# Patient Record
Sex: MALE | Race: BLACK OR AFRICAN AMERICAN | NOT HISPANIC OR LATINO | Employment: FULL TIME | URBAN - METROPOLITAN AREA
[De-identification: names, ages, dates, MRNs, and addresses within clinical notes are randomized per-mention and may not be internally consistent; named-entity substitution may affect disease eponyms.]

---

## 2023-10-04 PROBLEM — M48.062 LUMBAR STENOSIS WITH NEUROGENIC CLAUDICATION: Status: ACTIVE | Noted: 2023-10-04

## 2023-10-04 PROBLEM — R29.898 LEG WEAKNESS: Status: ACTIVE | Noted: 2023-10-04

## 2023-10-04 PROBLEM — M25.569 KNEE PAIN: Status: ACTIVE | Noted: 2023-10-04

## 2023-10-05 ENCOUNTER — TREATMENT (OUTPATIENT)
Dept: PHYSICAL THERAPY | Facility: CLINIC | Age: 73
End: 2023-10-05
Payer: COMMERCIAL

## 2023-10-05 DIAGNOSIS — M48.062 LUMBAR STENOSIS WITH NEUROGENIC CLAUDICATION: Primary | ICD-10-CM

## 2023-10-05 PROCEDURE — 97110 THERAPEUTIC EXERCISES: CPT | Mod: GP

## 2023-10-05 ASSESSMENT — ENCOUNTER SYMPTOMS
LOSS OF SENSATION IN FEET: 1
DEPRESSION: 0
OCCASIONAL FEELINGS OF UNSTEADINESS: 1

## 2023-10-05 NOTE — PROGRESS NOTES
"Physical Therapy    Physical Therapy Treatment    Patient Name: Chris Monique  MRN: [id number]  Today's Date: 10/5/2023  Time Calculation  Start Time: 1650  Stop Time: 1735  Time Calculation (min): 45 min  Visit number: 2    Assessment:  PT Assessment  Assessment Comment: Patient tolerated stretching and core strengthening well this session. Unfortunately he has not experinced centralization of his L LE radicular symptoms.    Plan:  OP PT Plan  Treatment/Interventions: Cryotherapy, Dry needling, Education/ Instruction, Electrical stimulation, Hot pack, Manual therapy, Neuromuscular re-education, Therapeutic activities, Therapeutic exercises  PT Plan: Skilled PT  PT Frequency: 1 time per week  Duration: 8 weeks  Onset Date: 01/01/87  Certification Period Start Date: 10/05/23  Certification Period End Date: 01/03/24  Rehab Potential: Good  Plan of Care Agreement: Patient    Current Problem  1. Lumbar stenosis with neurogenic claudication            Subjective   General  General Comment: Patient reports low back pain that radiates down his left leg to the foot.  Precautions   None   Pain   8/10    Treatments:  Therapeutic Exercise  Therapeutic Exercise Performed: Yes  Therapeutic Exercise Activity 1: bike x 5 minutes  Therapeutic Exercise Activity 2: back extension machine 40# 3 x 10  Therapeutic Exercise Activity 3: hamstring curl machine 33# 3 x 10  Therapeutic Exercise Activity 4: LTR 10 x 5\"  Therapeutic Exercise Activity 5: open book 10 x 10\"  Therapeutic Exercise Activity 6: supine PPT 20 x 5\"  Therapeutic Exercise Activity 7: piriformis stretch 3 x 30\"  Therapeutic Exercise Activity 8: bridge 2 x 10  Therapeutic Exercise Activity 9: TA brace marching x 20 ea leg  Therapeutic Exercise Activity 10: TA brace knee fallouts x 20 ea leg      Goals:  Encounter Problems       Encounter Problems (Active)       PT Problem       We are continuing the therapy plan of care and goals that were documented in the legacy EMR.  " Please refer to the PT (or OT) plan of care in the EMR for treatment plan and goals.        Start:  10/05/23    Expected End:  01/03/24

## 2023-10-12 ENCOUNTER — TREATMENT (OUTPATIENT)
Dept: PHYSICAL THERAPY | Facility: CLINIC | Age: 73
End: 2023-10-12
Payer: COMMERCIAL

## 2023-10-12 DIAGNOSIS — M48.062 LUMBAR STENOSIS WITH NEUROGENIC CLAUDICATION: Primary | ICD-10-CM

## 2023-10-12 PROCEDURE — 97110 THERAPEUTIC EXERCISES: CPT | Mod: GP

## 2023-10-12 NOTE — PROGRESS NOTES
"Physical Therapy    Physical Therapy Treatment    Patient Name: Chris Monique  MRN: [id number]  Today's Date: 10/12/2023  Time Calculation  Start Time: 1725  Stop Time: 1805  Time Calculation (min): 40 min  Visit: 3    Assessment:  PT Assessment  Assessment Comment: Patient tolerated stretching and core strengthening well this session. Unfortunately he has not experinced centralization of his L LE radicular symptoms.    Plan:  OP PT Plan  Treatment/Interventions: Cryotherapy, Dry needling, Education/ Instruction, Electrical stimulation, Hot pack, Manual therapy, Neuromuscular re-education, Therapeutic activities, Therapeutic exercises  PT Plan: Skilled PT  PT Frequency: 1 time per week  Duration: 8 weeks  Onset Date: 01/01/87  Certification Period Start Date: 10/05/23  Certification Period End Date: 01/03/24  Rehab Potential: Good  Plan of Care Agreement: Patient    Current Problem  1. Lumbar stenosis with neurogenic claudication            Subjective   General  General Comment: Patient reports low back pain that radiates down his left leg to the foot.  Precautions   None   Pain   8/10    Objective   Cognition   WNL    Treatments:  Therapeutic Exercise  Therapeutic Exercise Performed: Yes  Therapeutic Exercise Activity 1: bike x 5 minutes  Therapeutic Exercise Activity 2: back extension machine 40# 3 x 10  Therapeutic Exercise Activity 3: hamstring curl machine 33# 3 x 10  Therapeutic Exercise Activity 4: LTR 10 x 5\"  Therapeutic Exercise Activity 5: open book 10 x 10\"  Therapeutic Exercise Activity 6: supine PPT 20 x 5\"  Therapeutic Exercise Activity 7: piriformis stretch 3 x 30\"  Therapeutic Exercise Activity 8: hip flexor stretch 3 x 30\"  Therapeutic Exercise Activity 9: TA brace marching x 20 ea leg  Therapeutic Exercise Activity 10: TA brace knee fallouts x 20 ea leg       Goals:  Active       PT Problem       We are continuing the therapy plan of care and goals that were documented in the legacy EMR.  Please " refer to the PT (or OT) plan of care in the EMR for treatment plan and goals.        Start:  10/05/23    Expected End:  01/03/24

## 2023-10-19 ENCOUNTER — TREATMENT (OUTPATIENT)
Dept: PHYSICAL THERAPY | Facility: CLINIC | Age: 73
End: 2023-10-19
Payer: COMMERCIAL

## 2023-10-19 DIAGNOSIS — M48.062 LUMBAR STENOSIS WITH NEUROGENIC CLAUDICATION: Primary | ICD-10-CM

## 2023-10-19 PROCEDURE — 97110 THERAPEUTIC EXERCISES: CPT | Mod: GP

## 2023-10-19 NOTE — PROGRESS NOTES
"Physical Therapy    Physical Therapy Treatment    Patient Name: Chris Monique  MRN: [id number]  Today's Date: 10/19/2023  Time Calculation  Start Time: 1615  Stop Time: 1700  Time Calculation (min): 45 min  Visit:4    Assessment and Plan:  PT Assessment  Assessment Comment: Patient tolerated stretching and core strengthening well this session. Unfortunately he has not experinced centralization of his L LE radicular symptoms. Patient is compliant to discharged from formal physical therapy and complete his home exercise program independently. He will schedule an appointment with an orthopedic specialist to explore further treatment.        Current Problem  1. Lumbar stenosis with neurogenic claudication            Subjective   General  General Comment: Patient reports low back pain that radiates down his left leg to the foot.  Precautions   None     Pain   8/10    Objective   Cognition   WNL    Treatments:  Therapeutic Exercise  Therapeutic Exercise Performed: Yes  Therapeutic Exercise Activity 1: bike x 5 minutes  Therapeutic Exercise Activity 2: back extension machine 40# 3 x 10  Therapeutic Exercise Activity 3: hamstring curl machine 33# 3 x 10  Therapeutic Exercise Activity 4: LTR 10 x 5\"  Therapeutic Exercise Activity 5: open book 10 x 10\"  Therapeutic Exercise Activity 6: supine PPT 20 x 5\"  Therapeutic Exercise Activity 7: piriformis stretch 3 x 30\"  Therapeutic Exercise Activity 8: hip flexor stretch 3 x 30\"  Therapeutic Exercise Activity 9: TA brace marching x 20 ea leg  Therapeutic Exercise Activity 10: TA brace knee fallouts x 20 ea leg    Goals:  Active       PT Problem       We are continuing the therapy plan of care and [facility]  Please refer to the PT (or OT) plan of care in the EMR for treatment plan and goals.        Start:  10/05/23    Expected End:  01/03/24               "

## 2023-10-26 ENCOUNTER — TREATMENT (OUTPATIENT)
Dept: PHYSICAL THERAPY | Facility: CLINIC | Age: 73
End: 2023-10-26
Payer: COMMERCIAL

## 2023-11-06 ENCOUNTER — OFFICE VISIT (OUTPATIENT)
Dept: ORTHOPEDIC SURGERY | Facility: CLINIC | Age: 73
End: 2023-11-06
Payer: COMMERCIAL

## 2023-11-06 ENCOUNTER — ANCILLARY PROCEDURE (OUTPATIENT)
Dept: RADIOLOGY | Facility: CLINIC | Age: 73
End: 2023-11-06
Payer: COMMERCIAL

## 2023-11-06 DIAGNOSIS — M54.50 LUMBAR PAIN: ICD-10-CM

## 2023-11-06 DIAGNOSIS — M54.50 LUMBAR PAIN: Primary | ICD-10-CM

## 2023-11-06 DIAGNOSIS — M99.73 CONNECTIVE TISSUE AND DISC STENOSIS OF INTERVERTEBRAL FORAMINA OF LUMBAR REGION: ICD-10-CM

## 2023-11-06 PROCEDURE — 72110 X-RAY EXAM L-2 SPINE 4/>VWS: CPT

## 2023-11-06 PROCEDURE — 72110 X-RAY EXAM L-2 SPINE 4/>VWS: CPT | Performed by: RADIOLOGY

## 2023-11-06 PROCEDURE — 99213 OFFICE O/P EST LOW 20 MIN: CPT | Performed by: ORTHOPAEDIC SURGERY

## 2023-11-06 NOTE — PROGRESS NOTES
Chief Complaint: Chronic low back pain    HPI: Chris Monique is a 73 y.o. year old male patient with history of hypertension hyperlipidemia and BPH with chronic low back pain for the past 40 years.  He was in the [employer].  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  Pain located in the lower lumbar spine radiates across the beltline area.  Occasionally he has some radicular tingling sensation down his left leg.  He otherwise denies any left groin pain he does have some pain around his left hamstring which he think he pulled his hamstring while working out at the gym.  His radicular leg pain is worse with standing and walking long distances.  He can walk further if he leans forward or leans on something.  Denies any bowel or bladder disturbances or saddle anesthesia.    He does have a history of lumbar spine injections and January with some temporary relief.    He was seen by our PA Lexi hope in August 2023 where he was referred to physical therapy.  He did do therapy all through October he had a total of 4-5 sessions however he has not noticed any significant relief and was therefore referred to see me in clinic.  He has been discharged from physical therapy.    No anticoagulations  No tobacco use    He currently still working.  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 Burton at [facility] and is doing well.    Review of Systems    All other systems have been reviewed and are negative for complaint. All pertinent positive and negative as listed in history of present illness.    No past medical history on file.     No past surgical history on file.     Not on File     No current outpatient medications on file prior to visit.     No current facility-administered medications  on file prior to visit.          PE:   General: Patient appears well-nourished and well-developed in no acute distress, Alert and Oriented x3  Psych: Pleasant mood and affect  HEENT: Extraocular muscles intact, pupils equal and round. Sclerae anicteric   Cardio: extremities warm and well perfused  Resp: unlabored symmetric breathing  Skin: no open wounds or rash  Musculoskeletal/Neuro Exam: Antalgic gait with a cane.  Diffuse tenderness to palpation along the lumbar.  Negative straight leg raise bilaterally. Tight hamstrings bilaterally.  No groin pain with ROM of the hip joints with IR and ER.     Lower extremity    Motor: Right leg with 5 out of 5 motor strength with hip flexion, knee extension, ankle dorsiflexion plantarflexion and EHL against resistance.  Left leg with 5 out of 5 motor strength with hip flexion, knee extension, ankle dorsiflexion plantarflexion EHL against resistance  Sensation to light touch intact along L2 to S1 distribution bilaterally  2+ patella and achilles Reflex bilaterally        Imaging:    I personally reviewed xray of the lumbar spine obtained in clinic today. AP, Lateral, flexion and extension xrays were reviewed. No evidence of acute fracture or dislocation.  He has multilevel spondylotic changes between L3-L4 L4-L5 L5-S1 with disc base narrowing facet arthrosis.  Mild anterolisthesis of L4 on L5.  On the AP view of the lumbar spine I can also see the left hip which he has pretty severe left hip osteoarthritis and joint space narrowing with subchondral bone cyst and sclerosis.          A/P: Chris Monique is a 73 y.o. year old male patient with symptoms of chronic low back pain and symptoms of neurogenic claudication in his legs particularly the left leg.  He underwent a course of physical therapy for total 4 session in October without any significant relief and was discharged from physical therapy.  Given that he continues to be symptomatic without any relief I recommend that he get  an MRI of the lumbar spine without contrast to look for lumbar stenosis.  He was advised to schedule appointment to see me back after the MRI is complete.    After our discussion, the patient articulated understanding of the plan and felt that all questions had been answered satisfactorily. The patient was pleased with the visit and very appreciative for the care rendered.    **Please excuse any errors in grammar or translation related to this dictation. Voice recognition software was utilized to prepare this document. **            Jayna Lombardo MD    Department of Orthopaedic Surgery  [facility]  [email or web address]

## 2023-11-20 ENCOUNTER — TELEPHONE (OUTPATIENT)
Dept: ORTHOPEDIC SURGERY | Facility: CLINIC | Age: 73
End: 2023-11-20
Payer: COMMERCIAL

## 2023-11-20 NOTE — TELEPHONE ENCOUNTER
Patient was called to inform him that his MRI lumbar spine was approved by his insurance however his insurance did not approve the facility which was [facility].  The facility that they did approve was [facility].  I called the patient to let him know about this however it went to voicemail.  I did leave a voicemail for him.  I left him a phone number to schedule the MRI which was [phone].  He was advised to schedule appointment for the MRI and have them printed a MRI disc and then schedule an appointment to see me back in clinic to review.  I also left him my office number for him to call and schedule if he has any questions.

## 2023-11-21 ENCOUNTER — APPOINTMENT (OUTPATIENT)
Dept: RADIOLOGY | Facility: HOSPITAL | Age: 73
End: 2023-11-21
Payer: COMMERCIAL

## 2023-11-24 ENCOUNTER — APPOINTMENT (OUTPATIENT)
Dept: RADIOLOGY | Facility: HOSPITAL | Age: 73
End: 2023-11-24
Payer: COMMERCIAL

## 2024-01-08 ENCOUNTER — ANCILLARY PROCEDURE (OUTPATIENT)
Dept: RADIOLOGY | Facility: CLINIC | Age: 74
End: 2024-01-08
Payer: COMMERCIAL

## 2024-01-08 VITALS — BODY MASS INDEX: 30.1 KG/M2 | HEIGHT: 71 IN | WEIGHT: 215 LBS

## 2024-01-08 DIAGNOSIS — M99.73 CONNECTIVE TISSUE AND DISC STENOSIS OF INTERVERTEBRAL FORAMINA OF LUMBAR REGION: ICD-10-CM

## 2024-01-08 PROCEDURE — 72148 MRI LUMBAR SPINE W/O DYE: CPT

## 2024-01-08 PROCEDURE — 72148 MRI LUMBAR SPINE W/O DYE: CPT | Performed by: RADIOLOGY

## 2024-01-17 ENCOUNTER — OFFICE VISIT (OUTPATIENT)
Dept: ORTHOPEDIC SURGERY | Facility: CLINIC | Age: 74
End: 2024-01-17
Payer: COMMERCIAL

## 2024-01-17 DIAGNOSIS — R27.0 ATAXIA: Primary | ICD-10-CM

## 2024-01-17 DIAGNOSIS — G95.20 SPINAL CORD COMPRESSION (MULTI): ICD-10-CM

## 2024-01-17 DIAGNOSIS — M48.062 NEUROGENIC CLAUDICATION DUE TO LUMBAR SPINAL STENOSIS: ICD-10-CM

## 2024-01-17 PROCEDURE — 1125F AMNT PAIN NOTED PAIN PRSNT: CPT | Performed by: ORTHOPAEDIC SURGERY

## 2024-01-17 PROCEDURE — 99214 OFFICE O/P EST MOD 30 MIN: CPT | Performed by: ORTHOPAEDIC SURGERY

## 2024-01-17 PROCEDURE — 1159F MED LIST DOCD IN RCRD: CPT | Performed by: ORTHOPAEDIC SURGERY

## 2024-01-17 ASSESSMENT — PAIN SCALES - GENERAL: PAINLEVEL_OUTOF10: 7

## 2024-01-17 ASSESSMENT — PAIN DESCRIPTION - DESCRIPTORS: DESCRIPTORS: ACHING

## 2024-01-17 ASSESSMENT — PAIN - FUNCTIONAL ASSESSMENT: PAIN_FUNCTIONAL_ASSESSMENT: 0-10

## 2024-01-17 NOTE — PROGRESS NOTES
73-year-old male referred by former patient of mine, chief complaint of lower back pain left leg pain, neurogenic claudication.  He has had the symptoms for many years, progressively worsening over time.  He saw Lexi Barakat last fall, he has gone through treatment with physical therapy.  He also has had several epidural injections with Dr. Patel at [facility].  It was recommended at [facility] that he undergo surgery, he is here today for second opinion.    His quality of life is really suffering at this point.  He is very disabled in terms of walking, using a cane at this point.  Can only be on his feet for about 5 minutes before he would like to stop and sit down.    He also notes some upper extremity fine motor problems, drops objects occasionally.  He does have some numbness in his hands, also notices significant trouble with balance.    He is otherwise in fairly good health for his age.  He does not smoke.    On exam he is well-appearing and in no distress.  He walks with a broad-based, forward leaning gait.  No focal neurologic deficits in the arms or legs.  He is hyperreflexic in the upper extremities, positive Jorge sign bilaterally.    I personally reviewed x-rays and MRI of his lumbar spine.  These demonstrate elements of congenital stenosis including narrow interpedicular distance as well this congenitally shortened pedicles.  There is severe stenosis from L3-4 to L5-S1, lateral listhesis at L3-4 and L4-5, grade 1 anterolisthesis at L4-5.    73-year-old male with lumbar spinal stenosis, degenerative scoliosis, neurogenic claudication and left leg radicular pain.  We discussed the fact that surgery on his lumbar spine would likely be beneficial, however I am concerned that he may have occult cervical spinal stenosis as well.  He has elements of congenital narrowing with subjective and objective findings of spinal cord compression on exam.  Before we consider lumbar spine surgery I did  recommend we check an MRI of his cervical spine.  He is going to follow-up with me either by telephone or in the office to discuss the results of that test and further treatment options.    *This note was dictated using speech recognition software and was not corrected for spelling or grammatical errors*    History reviewed. No pertinent past medical history.    No current outpatient medications on file.     Social History     Socioeconomic History    Marital status: Single     Spouse name: Not on file    Number of children: Not on file    Years of education: Not on file    Highest education level: Not on file   Occupational History    Not on file   Tobacco Use    Smoking status: Not on file    Smokeless tobacco: Not on file   Substance and Sexual Activity    Alcohol use: Not on file    Drug use: Not on file    Sexual activity: Not on file   Other Topics Concern    Not on file   Social History Narrative    Not on file     Social Determinants of Health     Financial Resource Strain: Not on file   Food Insecurity: Not on file   Transportation Needs: Not on file   Physical Activity: Not on file   Stress: Not on file   Social Connections: Not on file   Intimate Partner Violence: Not on file   Housing Stability: Not on file       Juan Jose Jo MD  Director, [facility] [place]   Department of Orthopaedic Surgery  [facility]  [address] [place] [address]  [phone]

## 2024-01-31 ENCOUNTER — HOSPITAL ENCOUNTER (OUTPATIENT)
Dept: RADIOLOGY | Facility: HOSPITAL | Age: 74
Discharge: HOME | End: 2024-01-31
Payer: COMMERCIAL

## 2024-01-31 DIAGNOSIS — G95.20 SPINAL CORD COMPRESSION (MULTI): ICD-10-CM

## 2024-01-31 DIAGNOSIS — R27.0 ATAXIA: ICD-10-CM

## 2024-01-31 PROCEDURE — 72141 MRI NECK SPINE W/O DYE: CPT

## 2024-01-31 PROCEDURE — 72141 MRI NECK SPINE W/O DYE: CPT | Performed by: RADIOLOGY

## 2024-02-05 ENCOUNTER — DOCUMENTATION (OUTPATIENT)
Dept: PHYSICAL THERAPY | Facility: CLINIC | Age: 74
End: 2024-02-05
Payer: COMMERCIAL

## 2024-02-05 NOTE — PROGRESS NOTES
Physical Therapy    Discharge Summary    Name: Chris Monique  MRN: [id number]  : 1950  Date: 24    Discharge Summary: PT    Discharge Information: Date of discharge 24, Date of last visit 10/19/23, Date of evaluation 23, Number of attended visits 4, Referred by Carson Wesley MD, and Referred for lumbar radiculopathy    Therapy Summary: Plan of care consisted of improving flexibility and core strength     Discharge Status: Discharge from physical therapy      Rehab Discharge Reason: Progress plateaued; further improvement possible

## 2024-02-07 ENCOUNTER — OFFICE VISIT (OUTPATIENT)
Dept: ORTHOPEDIC SURGERY | Facility: CLINIC | Age: 74
End: 2024-02-07
Payer: COMMERCIAL

## 2024-02-07 DIAGNOSIS — M47.12 CERVICAL SPONDYLOSIS WITH MYELOPATHY: Primary | ICD-10-CM

## 2024-02-07 DIAGNOSIS — G95.20 SPINAL CORD COMPRESSION (MULTI): ICD-10-CM

## 2024-02-07 PROCEDURE — 99214 OFFICE O/P EST MOD 30 MIN: CPT | Performed by: ORTHOPAEDIC SURGERY

## 2024-02-07 PROCEDURE — 1159F MED LIST DOCD IN RCRD: CPT | Performed by: ORTHOPAEDIC SURGERY

## 2024-02-07 PROCEDURE — 1125F AMNT PAIN NOTED PAIN PRSNT: CPT | Performed by: ORTHOPAEDIC SURGERY

## 2024-02-07 RX ORDER — ACETAMINOPHEN 325 MG/1
975 TABLET ORAL ONCE
Status: CANCELLED | OUTPATIENT
Start: 2024-02-07 | End: 2024-02-07

## 2024-02-07 RX ORDER — GABAPENTIN 300 MG/1
600 CAPSULE ORAL ONCE
Status: CANCELLED | OUTPATIENT
Start: 2024-02-07 | End: 2024-02-07

## 2024-02-07 RX ORDER — SODIUM CHLORIDE, SODIUM LACTATE, POTASSIUM CHLORIDE, CALCIUM CHLORIDE 600; 310; 30; 20 MG/100ML; MG/100ML; MG/100ML; MG/100ML
100 INJECTION, SOLUTION INTRAVENOUS CONTINUOUS
Status: CANCELLED | OUTPATIENT
Start: 2024-02-07

## 2024-02-07 ASSESSMENT — PAIN - FUNCTIONAL ASSESSMENT: PAIN_FUNCTIONAL_ASSESSMENT: NO/DENIES PAIN

## 2024-02-07 NOTE — PROGRESS NOTES
Patient returns for follow-up to review his cervical MRI.  He has symptoms and signs of tandem cervical and lumbar spinal stenosis.    I personally reviewed the MRI of the cervical spine.  This shows congenital narrowing.  From C2-3 to C6-7 there is critical central stenosis with severe spinal cord compression.  There is grade 1 spondylolisthesis noted at C7-T1.    I am very concerned about his myelopathic features in addition to his severe spinal cord compression.  I recommended that he have this addressed surgically.  I explained that the goal of surgery is to decompress the spinal cord and prevent further worsening although the majority of his symptoms should improve, even including the left leg radicular pain.  He does have severe lumbar spinal stenosis as well, and I explained that he might very well need lumbar spine surgery after the cervical spine is completed to address the entire problem, but very often patients have at least temporary relief of their symptoms following cervical spine decompression.    He would like to proceed with surgery at [facility].    I discussed the risks of surgery including bleeding, infection, paralysis, dysphagia, hoarseness, biceps palsy, muscle weakness, CSF leak, bowel or bladder dysfunction, incomplete resolution of pain or numbness, possible worsening of preoperative symptoms, DVT/PE, heart attack, stroke, and other unforeseen medical and anesthesia complications.  He verbalized understanding of the risks, benefits, and alternatives to surgical treatment.  The plan will be for C3-7 laminectomy, C2-T1 fusion.  Surgery was scheduled for March 15.      *This note was dictated using speech recognition software and was not corrected for spelling or grammatical errors*

## 2024-02-29 ENCOUNTER — TELEMEDICINE CLINICAL SUPPORT (OUTPATIENT)
Dept: PREADMISSION TESTING | Facility: HOSPITAL | Age: 74
End: 2024-02-29
Payer: COMMERCIAL

## 2024-02-29 DIAGNOSIS — M47.12 CERVICAL SPONDYLOSIS WITH MYELOPATHY: ICD-10-CM

## 2024-02-29 DIAGNOSIS — G95.20 SPINAL CORD COMPRESSION (MULTI): ICD-10-CM

## 2024-03-07 ENCOUNTER — PREP FOR PROCEDURE (OUTPATIENT)
Dept: ORTHOPEDIC SURGERY | Facility: CLINIC | Age: 74
End: 2024-03-07

## 2024-04-03 ENCOUNTER — OFFICE VISIT (OUTPATIENT)
Dept: ORTHOPEDIC SURGERY | Facility: CLINIC | Age: 74
End: 2024-04-03
Payer: COMMERCIAL

## 2024-04-03 ENCOUNTER — APPOINTMENT (OUTPATIENT)
Dept: ORTHOPEDIC SURGERY | Facility: CLINIC | Age: 74
End: 2024-04-03
Payer: COMMERCIAL

## 2024-04-03 VITALS — WEIGHT: 215 LBS | BODY MASS INDEX: 30.1 KG/M2 | HEIGHT: 71 IN

## 2024-04-03 ASSESSMENT — PAIN - FUNCTIONAL ASSESSMENT: PAIN_FUNCTIONAL_ASSESSMENT: 0-10

## 2024-04-03 ASSESSMENT — PAIN SCALES - GENERAL: PAINLEVEL_OUTOF10: 5 - MODERATE PAIN

## 2024-04-03 ASSESSMENT — PAIN DESCRIPTION - DESCRIPTORS: DESCRIPTORS: ACHING

## 2024-04-12 ENCOUNTER — LAB (OUTPATIENT)
Dept: LAB | Facility: LAB | Age: 74
End: 2024-04-12
Payer: COMMERCIAL

## 2024-04-12 ENCOUNTER — PRE-ADMISSION TESTING (OUTPATIENT)
Dept: PREADMISSION TESTING | Facility: HOSPITAL | Age: 74
End: 2024-04-12
Payer: COMMERCIAL

## 2024-04-12 VITALS
BODY MASS INDEX: 30.99 KG/M2 | WEIGHT: 216.49 LBS | HEART RATE: 81 BPM | DIASTOLIC BLOOD PRESSURE: 73 MMHG | HEIGHT: 70 IN | OXYGEN SATURATION: 98 % | SYSTOLIC BLOOD PRESSURE: 142 MMHG | TEMPERATURE: 97.7 F

## 2024-04-12 DIAGNOSIS — Z01.818 PREOP TESTING: Primary | ICD-10-CM

## 2024-04-12 DIAGNOSIS — Z01.818 PREOP TESTING: ICD-10-CM

## 2024-04-12 DIAGNOSIS — G95.20 SPINAL CORD COMPRESSION (MULTI): ICD-10-CM

## 2024-04-12 DIAGNOSIS — M47.12 CERVICAL SPONDYLOSIS WITH MYELOPATHY: ICD-10-CM

## 2024-04-12 LAB
ABO GROUP (TYPE) IN BLOOD: NORMAL
ANION GAP SERPL CALC-SCNC: 11 MMOL/L (ref 10–20)
ANTIBODY SCREEN: NORMAL
APTT PPP: 36 SECONDS (ref 27–38)
BASOPHILS # BLD AUTO: 0.04 X10*3/UL (ref 0–0.1)
BASOPHILS NFR BLD AUTO: 0.7 %
BUN SERPL-MCNC: 16 MG/DL (ref 6–23)
CALCIUM SERPL-MCNC: 9.2 MG/DL (ref 8.6–10.3)
CHLORIDE SERPL-SCNC: 100 MMOL/L (ref 98–107)
CO2 SERPL-SCNC: 31 MMOL/L (ref 21–32)
CREAT SERPL-MCNC: 0.79 MG/DL (ref 0.5–1.3)
EGFRCR SERPLBLD CKD-EPI 2021: >90 ML/MIN/1.73M*2
EOSINOPHIL # BLD AUTO: 0.33 X10*3/UL (ref 0–0.4)
EOSINOPHIL NFR BLD AUTO: 5.7 %
ERYTHROCYTE [DISTWIDTH] IN BLOOD BY AUTOMATED COUNT: 12.9 % (ref 11.5–14.5)
EST. AVERAGE GLUCOSE BLD GHB EST-MCNC: 108 MG/DL
GLUCOSE SERPL-MCNC: 102 MG/DL (ref 74–99)
HBA1C MFR BLD: 5.4 %
HCT VFR BLD AUTO: 40.7 % (ref 41–52)
HGB BLD-MCNC: 13.5 G/DL (ref 13.5–17.5)
IMM GRANULOCYTES # BLD AUTO: 0.02 X10*3/UL (ref 0–0.5)
IMM GRANULOCYTES NFR BLD AUTO: 0.3 % (ref 0–0.9)
INR PPP: 1 (ref 0.9–1.1)
LYMPHOCYTES # BLD AUTO: 1.94 X10*3/UL (ref 0.8–3)
LYMPHOCYTES NFR BLD AUTO: 33.3 %
MCH RBC QN AUTO: 29.3 PG (ref 26–34)
MCHC RBC AUTO-ENTMCNC: 33.2 G/DL (ref 32–36)
MCV RBC AUTO: 89 FL (ref 80–100)
MONOCYTES # BLD AUTO: 0.36 X10*3/UL (ref 0.05–0.8)
MONOCYTES NFR BLD AUTO: 6.2 %
NEUTROPHILS # BLD AUTO: 3.13 X10*3/UL (ref 1.6–5.5)
NEUTROPHILS NFR BLD AUTO: 53.8 %
NRBC BLD-RTO: 0 /100 WBCS (ref 0–0)
PLATELET # BLD AUTO: 315 X10*3/UL (ref 150–450)
POTASSIUM SERPL-SCNC: 3.8 MMOL/L (ref 3.5–5.3)
PROTHROMBIN TIME: 11.2 SECONDS (ref 9.8–12.8)
RBC # BLD AUTO: 4.6 X10*6/UL (ref 4.5–5.9)
RH FACTOR (ANTIGEN D): NORMAL
SODIUM SERPL-SCNC: 138 MMOL/L (ref 136–145)
WBC # BLD AUTO: 5.8 X10*3/UL (ref 4.4–11.3)

## 2024-04-12 PROCEDURE — 86901 BLOOD TYPING SEROLOGIC RH(D): CPT

## 2024-04-12 PROCEDURE — 83036 HEMOGLOBIN GLYCOSYLATED A1C: CPT

## 2024-04-12 PROCEDURE — 80048 BASIC METABOLIC PNL TOTAL CA: CPT

## 2024-04-12 PROCEDURE — 36415 COLL VENOUS BLD VENIPUNCTURE: CPT

## 2024-04-12 PROCEDURE — 85025 COMPLETE CBC W/AUTO DIFF WBC: CPT

## 2024-04-12 PROCEDURE — 85730 THROMBOPLASTIN TIME PARTIAL: CPT

## 2024-04-12 PROCEDURE — 86900 BLOOD TYPING SEROLOGIC ABO: CPT

## 2024-04-12 PROCEDURE — 87081 CULTURE SCREEN ONLY: CPT | Mod: AHULAB | Performed by: PHYSICIAN ASSISTANT

## 2024-04-12 PROCEDURE — 86850 RBC ANTIBODY SCREEN: CPT

## 2024-04-12 PROCEDURE — 99204 OFFICE O/P NEW MOD 45 MIN: CPT | Performed by: PHYSICIAN ASSISTANT

## 2024-04-12 PROCEDURE — 93005 ELECTROCARDIOGRAM TRACING: CPT | Performed by: PHYSICIAN ASSISTANT

## 2024-04-12 PROCEDURE — 85610 PROTHROMBIN TIME: CPT

## 2024-04-12 RX ORDER — CHLORHEXIDINE GLUCONATE ORAL RINSE 1.2 MG/ML
SOLUTION DENTAL
Qty: 475 ML | Refills: 0 | Status: SHIPPED | OUTPATIENT
Start: 2024-04-12 | End: 2024-04-19

## 2024-04-12 RX ORDER — LISINOPRIL AND HYDROCHLOROTHIAZIDE 20; 25 MG/1; MG/1
1 TABLET ORAL DAILY
Status: ON HOLD | COMMUNITY
End: 2024-04-19 | Stop reason: WASHOUT

## 2024-04-12 RX ORDER — ASCORBIC ACID 500 MG/5ML
SYRUP ORAL DAILY
Status: ON HOLD | COMMUNITY
End: 2024-04-19 | Stop reason: ENTERED-IN-ERROR

## 2024-04-12 RX ORDER — VITAMIN E MIXED 400 UNIT
CAPSULE ORAL DAILY
COMMUNITY

## 2024-04-12 RX ORDER — UBIDECARENONE 75 MG
1 CAPSULE ORAL DAILY
COMMUNITY

## 2024-04-12 RX ORDER — BISMUTH SUBSALICYLATE 262 MG
1 TABLET,CHEWABLE ORAL DAILY
COMMUNITY

## 2024-04-12 RX ORDER — AMLODIPINE BESYLATE 5 MG/1
1 TABLET ORAL DAILY
COMMUNITY

## 2024-04-12 RX ORDER — FERROUS SULFATE, DRIED 160(50) MG
1 TABLET, EXTENDED RELEASE ORAL DAILY
Status: ON HOLD | COMMUNITY
End: 2024-04-19 | Stop reason: ENTERED-IN-ERROR

## 2024-04-12 RX ORDER — IBUPROFEN 400 MG/1
400 TABLET ORAL EVERY 6 HOURS PRN
COMMUNITY
End: 2024-04-21 | Stop reason: HOSPADM

## 2024-04-12 ASSESSMENT — ENCOUNTER SYMPTOMS
CONFUSION: 0
TROUBLE SWALLOWING: 0
CHILLS: 0
DIARRHEA: 0
EYE DISCHARGE: 0
VISUAL CHANGE: 0
ARTHRALGIAS: 1
LIGHT-HEADEDNESS: 0
BLOOD IN STOOL: 0
NECK PAIN: 0
EXCESSIVE BLEEDING: 0
WOUND: 0
PALPITATIONS: 0
NUMBNESS: 0
COUGH: 0
SHORTNESS OF BREATH: 0
UNEXPECTED WEIGHT CHANGE: 0
NECK STIFFNESS: 0
HEMOPTYSIS: 0
DYSPNEA WITH EXERTION: 0
VOMITING: 0
ABDOMINAL DISTENTION: 0
DYSPNEA AT REST: 0
CONSTIPATION: 0
WHEEZING: 0
SINUS CONGESTION: 0
DYSURIA: 0
DOUBLE VISION: 0
LIMITED RANGE OF MOTION: 0
MYALGIAS: 0
WEAKNESS: 0
DIFFICULTY URINATING: 0
NAUSEA: 0
RHINORRHEA: 0
BRUISES/BLEEDS EASILY: 0
ABDOMINAL PAIN: 0
SKIN CHANGES: 0
EYE PAIN: 0
FEVER: 0

## 2024-04-12 NOTE — H&P (VIEW-ONLY)
[facility] Evaluation       Name: Chris Monique (Chris Monique)  /Age: 1950/73 y.o.         Date of Consult: 24    Referring Provider: Dr. Jo    Surgery, Date, and Length: C3-C7 Cervical Laminectomy; C2-T1 Fusion , 24, 150MIN    Chris Monique is a 73 year-old male who presents to the [facility] for perioperative risk assessment prior to surgery.    Patient presents with a primary diagnosis of cervical spinal stenosis. chief complaint of lower back pain left leg pain, neurogenic claudication. He has had the symptoms for many years, progressively worsening over time. He saw Lexi Barakat last fall, he has gone through treatment with physical therapy. He also has had several epidural injections with Dr. Patle at [facility].     His quality of life is really suffering at this point. He is very disabled in terms of walking, using a cane at this point. Can only be on his feet for about 5 minutes before he would like to stop and sit down.     This note was created in part upon personal review of patient's medical records.      Patient is scheduled to have C3-C7 Cervical Laminectomy; C2-T1 Fusion      Pt denies any past history of anesthetic complications such as PONV, awareness, prolonged sedation, dental damage, aspiration, cardiac arrest, difficult intubation, difficult I.V. access or unexpected hospital admissions.  NO malignant hyperthermia and or pseudocholinesterase deficiency.  No history of blood transfusions     The patient is not a [employer] and will accept blood and blood products if medically indicated.   Type and screen sent.     Past Medical History:   Diagnosis Date    Aortic atherosclerosis (CMS-HCC)     without abdominal aortic aneurysm, vascular US 5/3/23    Cervical spondylosis with myelopathy     HLD (hyperlipidemia)     HTN (hypertension)     Low back pain     Lumbar stenosis with neurogenic claudication     Spinal cord compression (Multi)        Past  Surgical History:   Procedure Laterality Date    COLONOSCOPY      HERNIA REPAIR      SHOULDER OPEN ROTATOR CUFF REPAIR Left     TOTAL KNEE ARTHROPLASTY Bilateral        Patient  has no history on file for sexual activity.    Family History   Problem Relation Name Age of Onset    Dementia Mother      Prostate cancer Father      Hydrocephalus Sister      Dementia Brother      Hydrocephalus Brother      Kidney disease Brother         Social History     Socioeconomic History    Marital status: Single     Spouse name: Not on file    Number of children: Not on file    Years of education: Not on file    Highest education level: Not on file   Occupational History    Not on file   Tobacco Use    Smoking status: Former     Current packs/day: 0.00     Average packs/day: 0.3 packs/day for 5.0 years (1.3 ttl pk-yrs)     Types: Cigarettes     Start date:      Quit date:      Years since quittin.3    Smokeless tobacco: Never   Vaping Use    Vaping status: Never Used   Substance and Sexual Activity    Alcohol use: Yes     Alcohol/week: 3.0 standard drinks of alcohol     Types: 3 Glasses of wine per week    Drug use: Never    Sexual activity: Not on file   Other Topics Concern    Not on file   Social History Narrative    Not on file     Social Determinants of Health     Financial Resource Strain: Not on file   Food Insecurity: Not on file   Transportation Needs: Not on file   Physical Activity: Not on file   Stress: Not on file   Social Connections: Not on file   Intimate Partner Violence: Not on file   Housing Stability: Not on file      No Known Allergies      Current Outpatient Medications:     amLODIPine (Norvasc) 10 mg tablet, Take 1 tablet (10 mg) by mouth once daily., Disp: , Rfl:     ascorbic acid (Vitamin C) 500 mg/5 mL liquid, Take by mouth once daily., Disp: , Rfl:     calcium carbonate-vitamin D3 500 mg-5 mcg (200 unit) tablet, Take 1 tablet by mouth once daily., Disp: , Rfl:     cyanocobalamin (Vitamin  B-12) 250 mcg tablet, Take 1 tablet (250 mcg) by mouth once daily., Disp: , Rfl:     ibuprofen 400 mg tablet, Take 1 tablet (400 mg) by mouth every 6 hours if needed for moderate pain (4 - 6)., Disp: , Rfl:     lisinopriL-hydrochlorothiazide 20-25 mg tablet, Take 1 tablet by mouth once daily., Disp: , Rfl:     multivitamin tablet, Take 1 tablet by mouth once daily., Disp: , Rfl:     vitamin E 450 mg (1000 unit) capsule, Take by mouth once daily., Disp: , Rfl:     PAT ROS:   Constitutional:    no fever   no chills   no unexpected weight change  Neuro/Psych:    no numbness   no weakness   no light-headedness   no confusion  Eyes:    no discharge   no pain   no vision loss   no diplopia   no visual disturbance   use of corrective lenses  Ears:    no ear pain   no hearing loss   no tinnitus  Nose:    no nasal discharge   no sinus congestion   no epistaxis  Mouth:    no dental issues   no mouth pain   no oral bleeding   no mouth lesions  Throat:    no throat pain   no dysphagia  Neck:    no neck pain   no neck stiffness  Cardio:    Functional 4 Mets. Patient denies SOB walking up 2 flights of stairs   M,W,F [employer] fitness - 30 minute cardio / 30 minute weights   no chest pain   no palpitations   no peripheral edema   no dyspnea   no THOMSON  Respiratory:    no cough   no wheezing   no hemoptysis   no shortness of breath  Endocrine:    no cold intolerance   no heat intolerance  GI:    no abdominal distention   no abdominal pain   no constipation   no diarrhea   no nausea   no vomiting   no blood in stool  :    no difficulty urinating   no dysuria   no oliguria  Musculoskeletal:    arthralgias (LBP, BLE)   no myalgias   no decreased ROM  Hematologic:    does not bruise/bleed easily   no excessive bleeding   no history of blood transfusion   no blood clots  Skin:   no skin changes   no sores/wound   no rash      Physical Exam  Constitutional:       General: He is not in acute distress.     Appearance: Normal appearance. He is  "not ill-appearing, toxic-appearing or diaphoretic.   HENT:      Head: Normocephalic and atraumatic.      Nose: Nose normal. No rhinorrhea.      Mouth/Throat:      Pharynx: No oropharyngeal exudate.   Eyes:      Extraocular Movements: Extraocular movements intact.      Conjunctiva/sclera: Conjunctivae normal.   Cardiovascular:      Rate and Rhythm: Normal rate and regular rhythm.      Heart sounds: No murmur heard.     No friction rub. No gallop.      Comments: Functional 4 Mets. Patient denies SOB walking up 2 flights of stairs     Pulmonary:      Effort: Pulmonary effort is normal. No respiratory distress.      Breath sounds: Normal breath sounds. No stridor. No wheezing or rhonchi.   Abdominal:      General: Bowel sounds are normal. There is no distension.      Palpations: Abdomen is soft. There is no mass.      Tenderness: There is no abdominal tenderness. There is no guarding or rebound.      Hernia: A hernia is present.   Musculoskeletal:         General: Tenderness (pain with lumbar flexion; decreased ROM in b/l shoulders) present. No swelling, deformity or signs of injury. Normal range of motion.      Cervical back: Normal range of motion and neck supple. No rigidity or tenderness.   Skin:     General: Skin is warm and dry.      Coloration: Skin is not jaundiced or pale.      Findings: No bruising, erythema, lesion or rash.   Neurological:      General: No focal deficit present.      Mental Status: He is alert and oriented to person, place, and time.      Cranial Nerves: No cranial nerve deficit.      Sensory: No sensory deficit.      Motor: No weakness.      Coordination: Coordination normal.   Psychiatric:         Mood and Affect: Mood normal.         Behavior: Behavior normal.          PAT AIRWAY:   Airway:     Mallampati::  II    Neck ROM::  Full   Lower partial; full upper denture      Visit Vitals  /73   Pulse 81   Temp 36.5 °C (97.7 °F)   Ht 1.778 m (5' 10\")   Wt 98.2 kg (216 lb 7.9 oz)   SpO2 " 98%   BMI 31.06 kg/m²   Smoking Status Former   BSA 2.2 m²      LABS:  Lab Results   Component Value Date    WBC 5.8 04/12/2024    HGB 13.5 04/12/2024    HCT 40.7 (L) 04/12/2024    MCV 89 04/12/2024     04/12/2024     Lab Results   Component Value Date    GLUCOSE 102 (H) 04/12/2024    CALCIUM 9.2 04/12/2024     04/12/2024    K 3.8 04/12/2024    CO2 31 04/12/2024     04/12/2024    BUN 16 04/12/2024    CREATININE 0.79 04/12/2024      Coagulation Screen  Order: [id number]   Status: Final result       Visible to patient: No (inaccessible in [facility])       Dx: Cervical spondylosis with myelopathy;...    0 Result Notes      Component  Ref Range & Units 09:10   Protime  9.8 - 12.8 seconds 11.2   INR  0.9 - 1.1 1.0   aPTT  27 - 38 seconds 36   Resulting Agency [facility]              Narrative  Performed by: [facility]  The APTT is no longer used for monitoring Unfractionated Heparin Therapy. For monitoring Heparin Therapy, use the Heparin Assay.      Specimen Collected: 04/12/24 09:10 Last Resulted: 04/12/24 10:04           Lab Results   Component Value Date    HGBA1C 5.4 04/12/2024      EKG 4/12/24  NSR  Normal EKG  Vent rate = 80 bpm    US abdominal aorta 5/3/24  Proximal abdominal aorta: 2.8 cm, 2.6 cm.     Mid abdominal aorta: 2.3 cm, 2.6 cm.     Distal abdominal aorta: 1.9 cm, 1.8 cm.     Right common iliac artery: 1.2 cm, 1.1 cm.     Left common iliac artery: 1.3 cm, 1.0 cm.     IMPRESSION:   Aortic atherosclerotic disease without abdominal aortic aneurysm.     Assessment and Plan:     Patient is a 73-year-old male scheduled for a C3-C7 Cervical Laminectomy; C2-T1 Fusion with Dr. Jo on 4/19/24.  Patient has no active cardiac symptoms.   Patient denies any chest pain, tightness, heaviness, pressure, radiating pain, palpitations, irregular heartbeats, lightheadedness, cough, congestion, shortness of breath, THOMSON, PND, near syncope, weight loss or gain.     RCRI  0  , 3.9 % Risk of MACE    Cardiac:  HTN - cont  amlodipine on dos  Encouraged lifestyle modifications, low-sodium diet, and increase activity as tolerated.  Monitor BP and follow up with managing physician for readings sustaining >140/90.    HLD - pt not currently on any lipid lowering meds    Aortic atherosclerosis - US aorta 5/3/23 shows no aneurysm    Hematology:  Patient instructed to ambulate as soon as possible postoperatively to decrease thromboembolic risk.   Initiate mechanical DVT prophylaxis as soon as possible and initiate chemical prophylaxis when deemed safe from a bleeding standpoint post surgery.     LABS: CBC, BMP, coag, T&S, MRSA, A1c and EKG. Lab results reviewed and unremarkable.     Followup: MRSA and A1c pending    Addendum 4/15/24:  Hgba1c results reviewed and wnl at 5.4%     STOP BANG: HTN, male = 2    Caprini: 5    Risk assessment complete.  Patient is scheduled for a intermediate surgical risk procedure.        Preoperative medication instructions were provided and reviewed with the patient.  Any additional testing or evaluation was explained to the patient.  Nothing by mouth instructions were discussed and patient's questions were answered prior to conclusion to this encounter.  Patient verbalized understanding of preoperative instructions given in preadmission testing; discharge instructions available in EMR.    This note was dictated by a speech recognition.  Minor errors may have been detected in a speech recognition.

## 2024-04-12 NOTE — PREPROCEDURE INSTRUCTIONS
Medication List            Accurate as of April 12, 2024  8:33 AM. Always use your most recent med list.                amLODIPine 10 mg tablet  Commonly known as: Norvasc  Medication Adjustments for Surgery: Take morning of surgery with sip of water, no other fluids     ascorbic acid 500 mg/5 mL liquid  Commonly known as: Vitamin C  Medication Adjustments for Surgery: Stop 7 days before surgery     calcium carbonate-vitamin D3 500 mg-5 mcg (200 unit) tablet  Medication Adjustments for Surgery: Continue until night before surgery     cyanocobalamin 250 mcg tablet  Commonly known as: Vitamin B-12  Medication Adjustments for Surgery: Continue until night before surgery     ibuprofen 400 mg tablet  Medication Adjustments for Surgery: Stop 7 days before surgery     lisinopriL-hydrochlorothiazide 20-25 mg tablet  Medication Adjustments for Surgery: Stop 1 day before surgery     multivitamin tablet  Medication Adjustments for Surgery: Stop 7 days before surgery     vitamin E 450 mg (1000 unit) capsule  Medication Adjustments for Surgery: Stop 7 days before surgery                          **Concerning above medication instructions, if medication is normally taken at night, continue normal schedule.**  **DO NOT TAKE NIGHT PRIOR AND MORNING OF SURGERY**    CONTACT SURGEON'S OFFICE IF YOU DEVELOP:  * Fever = 100.4 F   * New respiratory symptoms (e.g. cough, shortness of breath, respiratory distress, sore throat)  * Recent loss of taste or smell  *Flu like symptoms such as headache, fatigue or gastrointestinal symptoms  * You develop any open sores, shingles, burning or painful urination   AND/OR:  * You no longer wish to have the surgery.  * Any other personal circumstances change that may lead to the need to cancel or defer this surgery.  *You were admitted to any hospital within one week of your planned procedure.    SMOKING:  *Quitting smoking can make a huge difference to your health and recovery from surgery.    *If  you need help with quitting, call 2-697-QUIT-NOW.    THE DAY BEFORE SURGERY:  *Do not eat any food after midnight the night before surgery.   *You are permitted to drink clear liquids (i.e. water, black coffee (no milk or cream), tea, apple juice and electrolyte drinks (gatorade)) up to 10 ounces, up to 2 hours before your arrival time.  *You may chew gum until 2 hours before your surgery    SURGICAL TIME  *You will be contacted between 2 p.m. and 6 p.m. the business day before your surgery with your arrival time.  *If you haven't received a call by 6pm, call [phone].  *Scheduled surgery times may change and you will be notified if this occurs-check your personal voicemail for any updates.    ON THE MORNING OF SURGERY:  *Wear comfortable, loose fitting clothing.   *Do not use moisturizers, creams, lotions or perfume.  *All jewelry and valuables should be left at home.  *Prosthetic devices such as contact lenses, hearing aids, dentures, eyelash extensions, hairpins and body piercing must be removed before surgery.    BRING WITH YOU:  *Photo ID and insurance card  *Current list of medicines and allergies  *Pacemaker/Defibrillator/Heart stent cards  *CPAP machine and mask  *Slings/splints/crutches  *Copy of your complete Advanced Directive/DHPOA-if applicable  *Neurostimulator implant remote    PARKING AND ARRIVAL:  *Check in at the Main Entrance desk and let them know you are here for surgery.  *You will be directed to the 2nd floor surgical waiting area.    AFTER OUTPATIENT SURGERY:  *A responsible adult MUST accompany you at the time of discharge and stay with you for 24 hours after your surgery.  *You may NOT drive yourself home after surgery.  *You may use a taxi or ride sharing service ([employer], [employer]) to return home ONLY if you are accompanied by a friend or family member.  *Instructions for resuming your medications will be provided by your surgeon.

## 2024-04-12 NOTE — CPM/PAT H&P
[facility] Evaluation       Name: Chris Monique (Chris Monique)  /Age: 1950/73 y.o.         Date of Consult: 24    Referring Provider: Dr. Jo    Surgery, Date, and Length: C3-C7 Cervical Laminectomy; C2-T1 Fusion , 24, 150MIN    Chris Monique is a 73 year-old male who presents to the [facility] for perioperative risk assessment prior to surgery.    Patient presents with a primary diagnosis of cervical spinal stenosis. chief complaint of lower back pain left leg pain, neurogenic claudication. He has had the symptoms for many years, progressively worsening over time. He saw Lexi Barakat last fall, he has gone through treatment with physical therapy. He also has had several epidural injections with Dr. Patel at [facility].     His quality of life is really suffering at this point. He is very disabled in terms of walking, using a cane at this point. Can only be on his feet for about 5 minutes before he would like to stop and sit down.     This note was created in part upon personal review of patient's medical records.      Patient is scheduled to have C3-C7 Cervical Laminectomy; C2-T1 Fusion      Pt denies any past history of anesthetic complications such as PONV, awareness, prolonged sedation, dental damage, aspiration, cardiac arrest, difficult intubation, difficult I.V. access or unexpected hospital admissions.  NO malignant hyperthermia and or pseudocholinesterase deficiency.  No history of blood transfusions     The patient is not a Sikhism and will accept blood and blood products if medically indicated.   Type and screen sent.     Past Medical History:   Diagnosis Date    Aortic atherosclerosis (CMS-HCC)     without abdominal aortic aneurysm, vascular US 5/3/23    Cervical spondylosis with myelopathy     HLD (hyperlipidemia)     HTN (hypertension)     Low back pain     Lumbar stenosis with neurogenic claudication     Spinal cord compression (Multi)        Past  Surgical History:   Procedure Laterality Date    COLONOSCOPY      HERNIA REPAIR      SHOULDER OPEN ROTATOR CUFF REPAIR Left     TOTAL KNEE ARTHROPLASTY Bilateral        Patient  has no history on file for sexual activity.    Family History   Problem Relation Name Age of Onset    Dementia Mother      Prostate cancer Father      Hydrocephalus Sister      Dementia Brother      Hydrocephalus Brother      Kidney disease Brother         Social History     Socioeconomic History    Marital status: Single     Spouse name: Not on file    Number of children: Not on file    Years of education: Not on file    Highest education level: Not on file   Occupational History    Not on file   Tobacco Use    Smoking status: Former     Current packs/day: 0.00     Average packs/day: 0.3 packs/day for 5.0 years (1.3 ttl pk-yrs)     Types: Cigarettes     Start date:      Quit date:      Years since quittin.3    Smokeless tobacco: Never   Vaping Use    Vaping status: Never Used   Substance and Sexual Activity    Alcohol use: Yes     Alcohol/week: 3.0 standard drinks of alcohol     Types: 3 Glasses of wine per week    Drug use: Never    Sexual activity: Not on file   Other Topics Concern    Not on file   Social History Narrative    Not on file     Social Determinants of Health     Financial Resource Strain: Not on file   Food Insecurity: Not on file   Transportation Needs: Not on file   Physical Activity: Not on file   Stress: Not on file   Social Connections: Not on file   Intimate Partner Violence: Not on file   Housing Stability: Not on file      No Known Allergies      Current Outpatient Medications:     amLODIPine (Norvasc) 10 mg tablet, Take 1 tablet (10 mg) by mouth once daily., Disp: , Rfl:     ascorbic acid (Vitamin C) 500 mg/5 mL liquid, Take by mouth once daily., Disp: , Rfl:     calcium carbonate-vitamin D3 500 mg-5 mcg (200 unit) tablet, Take 1 tablet by mouth once daily., Disp: , Rfl:     cyanocobalamin (Vitamin  B-12) 250 mcg tablet, Take 1 tablet (250 mcg) by mouth once daily., Disp: , Rfl:     ibuprofen 400 mg tablet, Take 1 tablet (400 mg) by mouth every 6 hours if needed for moderate pain (4 - 6)., Disp: , Rfl:     lisinopriL-hydrochlorothiazide 20-25 mg tablet, Take 1 tablet by mouth once daily., Disp: , Rfl:     multivitamin tablet, Take 1 tablet by mouth once daily., Disp: , Rfl:     vitamin E 450 mg (1000 unit) capsule, Take by mouth once daily., Disp: , Rfl:     PAT ROS:   Constitutional:    no fever   no chills   no unexpected weight change  Neuro/Psych:    no numbness   no weakness   no light-headedness   no confusion  Eyes:    no discharge   no pain   no vision loss   no diplopia   no visual disturbance   use of corrective lenses  Ears:    no ear pain   no hearing loss   no tinnitus  Nose:    no nasal discharge   no sinus congestion   no epistaxis  Mouth:    no dental issues   no mouth pain   no oral bleeding   no mouth lesions  Throat:    no throat pain   no dysphagia  Neck:    no neck pain   no neck stiffness  Cardio:    Functional 4 Mets. Patient denies SOB walking up 2 flights of stairs   M,W,F [employer] - 30 minute cardio / 30 minute weights   no chest pain   no palpitations   no peripheral edema   no dyspnea   no THOMSON  Respiratory:    no cough   no wheezing   no hemoptysis   no shortness of breath  Endocrine:    no cold intolerance   no heat intolerance  GI:    no abdominal distention   no abdominal pain   no constipation   no diarrhea   no nausea   no vomiting   no blood in stool  :    no difficulty urinating   no dysuria   no oliguria  Musculoskeletal:    arthralgias (LBP, BLE)   no myalgias   no decreased ROM  Hematologic:    does not bruise/bleed easily   no excessive bleeding   no history of blood transfusion   no blood clots  Skin:   no skin changes   no sores/wound   no rash      Physical Exam  Constitutional:       General: He is not in acute distress.     Appearance: Normal appearance. He is  "not ill-appearing, toxic-appearing or diaphoretic.   HENT:      Head: Normocephalic and atraumatic.      Nose: Nose normal. No rhinorrhea.      Mouth/Throat:      Pharynx: No oropharyngeal exudate.   Eyes:      Extraocular Movements: Extraocular movements intact.      Conjunctiva/sclera: Conjunctivae normal.   Cardiovascular:      Rate and Rhythm: Normal rate and regular rhythm.      Heart sounds: No murmur heard.     No friction rub. No gallop.      Comments: Functional 4 Mets. Patient denies SOB walking up 2 flights of stairs     Pulmonary:      Effort: Pulmonary effort is normal. No respiratory distress.      Breath sounds: Normal breath sounds. No stridor. No wheezing or rhonchi.   Abdominal:      General: Bowel sounds are normal. There is no distension.      Palpations: Abdomen is soft. There is no mass.      Tenderness: There is no abdominal tenderness. There is no guarding or rebound.      Hernia: A hernia is present.   Musculoskeletal:         General: Tenderness (pain with lumbar flexion; decreased ROM in b/l shoulders) present. No swelling, deformity or signs of injury. Normal range of motion.      Cervical back: Normal range of motion and neck supple. No rigidity or tenderness.   Skin:     General: Skin is warm and dry.      Coloration: Skin is not jaundiced or pale.      Findings: No bruising, erythema, lesion or rash.   Neurological:      General: No focal deficit present.      Mental Status: He is alert and oriented to person, place, and time.      Cranial Nerves: No cranial nerve deficit.      Sensory: No sensory deficit.      Motor: No weakness.      Coordination: Coordination normal.   Psychiatric:         Mood and Affect: Mood normal.         Behavior: Behavior normal.          PAT AIRWAY:   Airway:     Mallampati::  II    Neck ROM::  Full   Lower partial; full upper denture      Visit Vitals  /73   Pulse 81   Temp 36.5 °C (97.7 °F)   Ht 1.778 m (5' 10\")   Wt 98.2 kg (216 lb 7.9 oz)   SpO2 " 98%   BMI 31.06 kg/m²   Smoking Status Former   BSA 2.2 m²      LABS:  Lab Results   Component Value Date    WBC 5.8 04/12/2024    HGB 13.5 04/12/2024    HCT 40.7 (L) 04/12/2024    MCV 89 04/12/2024     04/12/2024     Lab Results   Component Value Date    GLUCOSE 102 (H) 04/12/2024    CALCIUM 9.2 04/12/2024     04/12/2024    K 3.8 04/12/2024    CO2 31 04/12/2024     04/12/2024    BUN 16 04/12/2024    CREATININE 0.79 04/12/2024      Coagulation Screen  Order: [id number]   Status: Final result       Visible to patient: No (inaccessible in [facility])       Dx: Cervical spondylosis with myelopathy;...    0 Result Notes      Component  Ref Range & Units 09:10   Protime  9.8 - 12.8 seconds 11.2   INR  0.9 - 1.1 1.0   aPTT  27 - 38 seconds 36   Resulting Agency [facility]              Narrative  Performed by: [facility]  The APTT is no longer used for monitoring Unfractionated Heparin Therapy. For monitoring Heparin Therapy, use the Heparin Assay.      Specimen Collected: 04/12/24 09:10 Last Resulted: 04/12/24 10:04           Lab Results   Component Value Date    HGBA1C 5.4 04/12/2024      EKG 4/12/24  NSR  Normal EKG  Vent rate = 80 bpm    US abdominal aorta 5/3/24  Proximal abdominal aorta: 2.8 cm, 2.6 cm.     Mid abdominal aorta: 2.3 cm, 2.6 cm.     Distal abdominal aorta: 1.9 cm, 1.8 cm.     Right common iliac artery: 1.2 cm, 1.1 cm.     Left common iliac artery: 1.3 cm, 1.0 cm.     IMPRESSION:   Aortic atherosclerotic disease without abdominal aortic aneurysm.     Assessment and Plan:     Patient is a 73-year-old male scheduled for a C3-C7 Cervical Laminectomy; C2-T1 Fusion with Dr. Jo on 4/19/24.  Patient has no active cardiac symptoms.   Patient denies any chest pain, tightness, heaviness, pressure, radiating pain, palpitations, irregular heartbeats, lightheadedness, cough, congestion, shortness of breath, THOMSON, PND, near syncope, weight loss or gain.     RCRI  0  , 3.9 % Risk of MACE    Cardiac:  HTN - cont  amlodipine on dos  Encouraged lifestyle modifications, low-sodium diet, and increase activity as tolerated.  Monitor BP and follow up with managing physician for readings sustaining >140/90.    HLD - pt not currently on any lipid lowering meds    Aortic atherosclerosis - US aorta 5/3/23 shows no aneurysm    Hematology:  Patient instructed to ambulate as soon as possible postoperatively to decrease thromboembolic risk.   Initiate mechanical DVT prophylaxis as soon as possible and initiate chemical prophylaxis when deemed safe from a bleeding standpoint post surgery.     LABS: CBC, BMP, coag, T&S, MRSA, A1c and EKG. Lab results reviewed and unremarkable.     Followup: MRSA and A1c pending    Addendum 4/15/24:  Hgba1c results reviewed and wnl at 5.4%     STOP BANG: HTN, male = 2    Caprini: 5    Risk assessment complete.  Patient is scheduled for a intermediate surgical risk procedure.        Preoperative medication instructions were provided and reviewed with the patient.  Any additional testing or evaluation was explained to the patient.  Nothing by mouth instructions were discussed and patient's questions were answered prior to conclusion to this encounter.  Patient verbalized understanding of preoperative instructions given in preadmission testing; discharge instructions available in EMR.    This note was dictated by a speech recognition.  Minor errors may have been detected in a speech recognition.

## 2024-04-14 LAB — STAPHYLOCOCCUS SPEC CULT: NORMAL

## 2024-04-15 LAB
ATRIAL RATE: 80 BPM
P AXIS: 70 DEGREES
P OFFSET: 196 MS
P ONSET: 133 MS
PR INTERVAL: 178 MS
Q ONSET: 222 MS
QRS COUNT: 13 BEATS
QRS DURATION: 102 MS
QT INTERVAL: 406 MS
QTC CALCULATION(BAZETT): 468 MS
QTC FREDERICIA: 447 MS
R AXIS: -27 DEGREES
T AXIS: 44 DEGREES
T OFFSET: 425 MS
VENTRICULAR RATE: 80 BPM

## 2024-04-19 ENCOUNTER — PHARMACY VISIT (OUTPATIENT)
Dept: PHARMACY | Facility: CLINIC | Age: 74
End: 2024-04-19
Payer: MEDICARE

## 2024-04-19 ENCOUNTER — HOSPITAL ENCOUNTER (INPATIENT)
Facility: HOSPITAL | Age: 74
LOS: 1 days | Discharge: HOME | DRG: 472 | End: 2024-04-21
Attending: ORTHOPAEDIC SURGERY | Admitting: INTERNAL MEDICINE
Payer: COMMERCIAL

## 2024-04-19 ENCOUNTER — APPOINTMENT (OUTPATIENT)
Dept: RADIOLOGY | Facility: HOSPITAL | Age: 74
DRG: 472 | End: 2024-04-19
Payer: COMMERCIAL

## 2024-04-19 ENCOUNTER — ANESTHESIA (OUTPATIENT)
Dept: OPERATING ROOM | Facility: HOSPITAL | Age: 74
DRG: 472 | End: 2024-04-19
Payer: COMMERCIAL

## 2024-04-19 ENCOUNTER — ANESTHESIA EVENT (OUTPATIENT)
Dept: OPERATING ROOM | Facility: HOSPITAL | Age: 74
DRG: 472 | End: 2024-04-19
Payer: COMMERCIAL

## 2024-04-19 DIAGNOSIS — G95.20 SPINAL CORD COMPRESSION (MULTI): ICD-10-CM

## 2024-04-19 DIAGNOSIS — M47.12 CERVICAL SPONDYLOSIS WITH MYELOPATHY: Primary | ICD-10-CM

## 2024-04-19 PROBLEM — I70.0 AORTIC ATHEROSCLEROSIS (CMS-HCC): Status: ACTIVE | Noted: 2024-04-19

## 2024-04-19 LAB
ABO GROUP (TYPE) IN BLOOD: NORMAL
RH FACTOR (ANTIGEN D): NORMAL

## 2024-04-19 PROCEDURE — A63045 PR LAMINEC/FACETECT/FORAMIN,CERVICAL 1 SEG

## 2024-04-19 PROCEDURE — 3600000018 HC OR TIME - INITIAL BASE CHARGE - PROCEDURE LEVEL SIX: Performed by: ORTHOPAEDIC SURGERY

## 2024-04-19 PROCEDURE — G0378 HOSPITAL OBSERVATION PER HR: HCPCS

## 2024-04-19 PROCEDURE — 63045 LAM FACETEC & FORAMOT CRV: CPT | Performed by: ORTHOPAEDIC SURGERY

## 2024-04-19 PROCEDURE — 2500000005 HC RX 250 GENERAL PHARMACY W/O HCPCS

## 2024-04-19 PROCEDURE — 2500000001 HC RX 250 WO HCPCS SELF ADMINISTERED DRUGS (ALT 637 FOR MEDICARE OP): Performed by: HOSPITALIST

## 2024-04-19 PROCEDURE — 00NW0ZZ RELEASE CERVICAL SPINAL CORD, OPEN APPROACH: ICD-10-PCS | Performed by: ORTHOPAEDIC SURGERY

## 2024-04-19 PROCEDURE — 01N10ZZ RELEASE CERVICAL NERVE, OPEN APPROACH: ICD-10-PCS | Performed by: ORTHOPAEDIC SURGERY

## 2024-04-19 PROCEDURE — 2500000006 HC RX 250 W HCPCS SELF ADMINISTERED DRUGS (ALT 637 FOR ALL PAYERS): Performed by: HOSPITALIST

## 2024-04-19 PROCEDURE — 2500000004 HC RX 250 GENERAL PHARMACY W/ HCPCS (ALT 636 FOR OP/ED)

## 2024-04-19 PROCEDURE — C1713 ANCHOR/SCREW BN/BN,TIS/BN: HCPCS | Performed by: ORTHOPAEDIC SURGERY

## 2024-04-19 PROCEDURE — A4649 SURGICAL SUPPLIES: HCPCS | Performed by: ORTHOPAEDIC SURGERY

## 2024-04-19 PROCEDURE — 36415 COLL VENOUS BLD VENIPUNCTURE: CPT | Performed by: ORTHOPAEDIC SURGERY

## 2024-04-19 PROCEDURE — 2500000004 HC RX 250 GENERAL PHARMACY W/ HCPCS (ALT 636 FOR OP/ED): Performed by: ANESTHESIOLOGY

## 2024-04-19 PROCEDURE — 22600 ARTHRD PST TQ 1NTRSPC CRV: CPT | Performed by: ORTHOPAEDIC SURGERY

## 2024-04-19 PROCEDURE — 3700000002 HC GENERAL ANESTHESIA TIME - EACH INCREMENTAL 1 MINUTE: Performed by: ORTHOPAEDIC SURGERY

## 2024-04-19 PROCEDURE — 2500000004 HC RX 250 GENERAL PHARMACY W/ HCPCS (ALT 636 FOR OP/ED): Mod: JZ | Performed by: ORTHOPAEDIC SURGERY

## 2024-04-19 PROCEDURE — 2720000007 HC OR 272 NO HCPCS: Performed by: ORTHOPAEDIC SURGERY

## 2024-04-19 PROCEDURE — A4217 STERILE WATER/SALINE, 500 ML: HCPCS | Performed by: ORTHOPAEDIC SURGERY

## 2024-04-19 PROCEDURE — 72020 X-RAY EXAM OF SPINE 1 VIEW: CPT

## 2024-04-19 PROCEDURE — 97116 GAIT TRAINING THERAPY: CPT | Mod: GP

## 2024-04-19 PROCEDURE — A63045 PR LAMINEC/FACETECT/FORAMIN,CERVICAL 1 SEG: Performed by: ANESTHESIOLOGY

## 2024-04-19 PROCEDURE — 7100000002 HC RECOVERY ROOM TIME - EACH INCREMENTAL 1 MINUTE: Performed by: ORTHOPAEDIC SURGERY

## 2024-04-19 PROCEDURE — 3700000001 HC GENERAL ANESTHESIA TIME - INITIAL BASE CHARGE: Performed by: ORTHOPAEDIC SURGERY

## 2024-04-19 PROCEDURE — 0RG20K1 FUSION OF 2 OR MORE CERVICAL VERTEBRAL JOINTS WITH NONAUTOLOGOUS TISSUE SUBSTITUTE, POSTERIOR APPROACH, POSTERIOR COLUMN, OPEN APPROACH: ICD-10-PCS | Performed by: ORTHOPAEDIC SURGERY

## 2024-04-19 PROCEDURE — 2780000003 HC OR 278 NO HCPCS: Performed by: ORTHOPAEDIC SURGERY

## 2024-04-19 PROCEDURE — 3600000017 HC OR TIME - EACH INCREMENTAL 1 MINUTE - PROCEDURE LEVEL SIX: Performed by: ORTHOPAEDIC SURGERY

## 2024-04-19 PROCEDURE — 7100000001 HC RECOVERY ROOM TIME - INITIAL BASE CHARGE: Performed by: ORTHOPAEDIC SURGERY

## 2024-04-19 PROCEDURE — 2500000004 HC RX 250 GENERAL PHARMACY W/ HCPCS (ALT 636 FOR OP/ED): Performed by: HOSPITALIST

## 2024-04-19 PROCEDURE — 22614 ARTHRD PST TQ 1NTRSPC EA ADD: CPT | Performed by: ORTHOPAEDIC SURGERY

## 2024-04-19 PROCEDURE — 63048 LAM FACETEC &FORAMOT EA ADDL: CPT | Performed by: ORTHOPAEDIC SURGERY

## 2024-04-19 PROCEDURE — RXMED WILLOW AMBULATORY MEDICATION CHARGE

## 2024-04-19 PROCEDURE — 97161 PT EVAL LOW COMPLEX 20 MIN: CPT | Mod: GP

## 2024-04-19 PROCEDURE — 99100 ANES PT EXTEME AGE<1 YR&>70: CPT | Performed by: ANESTHESIOLOGY

## 2024-04-19 PROCEDURE — 2500000001 HC RX 250 WO HCPCS SELF ADMINISTERED DRUGS (ALT 637 FOR MEDICARE OP): Performed by: ORTHOPAEDIC SURGERY

## 2024-04-19 PROCEDURE — 9420000001 HC RT PATIENT EDUCATION 5 MIN

## 2024-04-19 PROCEDURE — 99222 1ST HOSP IP/OBS MODERATE 55: CPT | Performed by: HOSPITALIST

## 2024-04-19 PROCEDURE — 20930 SP BONE ALGRFT MORSEL ADD-ON: CPT | Performed by: ORTHOPAEDIC SURGERY

## 2024-04-19 PROCEDURE — 22842 INSERT SPINE FIXATION DEVICE: CPT | Performed by: ORTHOPAEDIC SURGERY

## 2024-04-19 DEVICE — PUTTY ATTRAX, 5CC US: Type: IMPLANTABLE DEVICE | Site: SPINE CERVICAL | Status: FUNCTIONAL

## 2024-04-19 DEVICE — IMPLANTABLE DEVICE: Type: IMPLANTABLE DEVICE | Site: SPINE CERVICAL | Status: FUNCTIONAL

## 2024-04-19 RX ORDER — DROPERIDOL 2.5 MG/ML
0.62 INJECTION, SOLUTION INTRAMUSCULAR; INTRAVENOUS ONCE AS NEEDED
Status: DISCONTINUED | OUTPATIENT
Start: 2024-04-19 | End: 2024-04-19 | Stop reason: HOSPADM

## 2024-04-19 RX ORDER — SODIUM CHLORIDE, SODIUM LACTATE, POTASSIUM CHLORIDE, CALCIUM CHLORIDE 600; 310; 30; 20 MG/100ML; MG/100ML; MG/100ML; MG/100ML
100 INJECTION, SOLUTION INTRAVENOUS CONTINUOUS
Status: DISCONTINUED | OUTPATIENT
Start: 2024-04-19 | End: 2024-04-21 | Stop reason: HOSPADM

## 2024-04-19 RX ORDER — NALOXONE HYDROCHLORIDE 0.4 MG/ML
0.2 INJECTION, SOLUTION INTRAMUSCULAR; INTRAVENOUS; SUBCUTANEOUS EVERY 5 MIN PRN
Status: DISCONTINUED | OUTPATIENT
Start: 2024-04-19 | End: 2024-04-21 | Stop reason: HOSPADM

## 2024-04-19 RX ORDER — OXYCODONE HYDROCHLORIDE 5 MG/1
5 TABLET ORAL EVERY 4 HOURS PRN
Status: DISCONTINUED | OUTPATIENT
Start: 2024-04-19 | End: 2024-04-21 | Stop reason: HOSPADM

## 2024-04-19 RX ORDER — OXYCODONE HYDROCHLORIDE 5 MG/1
5 TABLET ORAL EVERY 4 HOURS PRN
Qty: 40 TABLET | Refills: 0 | Status: SHIPPED | OUTPATIENT
Start: 2024-04-19 | End: 2024-04-26 | Stop reason: SDUPTHER

## 2024-04-19 RX ORDER — METHOCARBAMOL 500 MG/1
TABLET, FILM COATED ORAL
Qty: 60 TABLET | Refills: 2 | Status: SHIPPED | OUTPATIENT
Start: 2024-04-19 | End: 2024-04-30 | Stop reason: WASHOUT

## 2024-04-19 RX ORDER — PROCHLORPERAZINE EDISYLATE 5 MG/ML
10 INJECTION INTRAMUSCULAR; INTRAVENOUS EVERY 6 HOURS PRN
Status: DISCONTINUED | OUTPATIENT
Start: 2024-04-19 | End: 2024-04-21 | Stop reason: HOSPADM

## 2024-04-19 RX ORDER — PHENYLEPHRINE HCL IN 0.9% NACL 1 MG/10 ML
SYRINGE (ML) INTRAVENOUS AS NEEDED
Status: DISCONTINUED | OUTPATIENT
Start: 2024-04-19 | End: 2024-04-19

## 2024-04-19 RX ORDER — MIDAZOLAM HYDROCHLORIDE 1 MG/ML
INJECTION INTRAMUSCULAR; INTRAVENOUS AS NEEDED
Status: DISCONTINUED | OUTPATIENT
Start: 2024-04-19 | End: 2024-04-19

## 2024-04-19 RX ORDER — AMLODIPINE BESYLATE 5 MG/1
5 TABLET ORAL DAILY
Status: DISCONTINUED | OUTPATIENT
Start: 2024-04-20 | End: 2024-04-21 | Stop reason: HOSPADM

## 2024-04-19 RX ORDER — TAMSULOSIN HYDROCHLORIDE 0.4 MG/1
1 CAPSULE ORAL DAILY
COMMUNITY

## 2024-04-19 RX ORDER — PROPOFOL 10 MG/ML
INJECTION, EMULSION INTRAVENOUS AS NEEDED
Status: DISCONTINUED | OUTPATIENT
Start: 2024-04-19 | End: 2024-04-19

## 2024-04-19 RX ORDER — SODIUM CHLORIDE 0.9 G/100ML
IRRIGANT IRRIGATION AS NEEDED
Status: DISCONTINUED | OUTPATIENT
Start: 2024-04-19 | End: 2024-04-19 | Stop reason: HOSPADM

## 2024-04-19 RX ORDER — UBIDECARENONE 75 MG
250 CAPSULE ORAL DAILY
Status: DISCONTINUED | OUTPATIENT
Start: 2024-04-19 | End: 2024-04-21 | Stop reason: HOSPADM

## 2024-04-19 RX ORDER — KETOROLAC TROMETHAMINE 30 MG/ML
INJECTION, SOLUTION INTRAMUSCULAR; INTRAVENOUS AS NEEDED
Status: DISCONTINUED | OUTPATIENT
Start: 2024-04-19 | End: 2024-04-19

## 2024-04-19 RX ORDER — KETOROLAC TROMETHAMINE 30 MG/ML
15 INJECTION, SOLUTION INTRAMUSCULAR; INTRAVENOUS EVERY 6 HOURS SCHEDULED
Qty: 6 ML | Refills: 0 | Status: DISCONTINUED | OUTPATIENT
Start: 2024-04-19 | End: 2024-04-19

## 2024-04-19 RX ORDER — SODIUM CHLORIDE, SODIUM LACTATE, POTASSIUM CHLORIDE, CALCIUM CHLORIDE 600; 310; 30; 20 MG/100ML; MG/100ML; MG/100ML; MG/100ML
100 INJECTION, SOLUTION INTRAVENOUS CONTINUOUS
Status: DISCONTINUED | OUTPATIENT
Start: 2024-04-19 | End: 2024-04-19 | Stop reason: HOSPADM

## 2024-04-19 RX ORDER — HYDRALAZINE HYDROCHLORIDE 25 MG/1
25 TABLET, FILM COATED ORAL ONCE
Status: COMPLETED | OUTPATIENT
Start: 2024-04-19 | End: 2024-04-19

## 2024-04-19 RX ORDER — DIPHENHYDRAMINE HYDROCHLORIDE 50 MG/ML
25 INJECTION INTRAMUSCULAR; INTRAVENOUS ONCE
Status: COMPLETED | OUTPATIENT
Start: 2024-04-19 | End: 2024-04-19

## 2024-04-19 RX ORDER — PROCHLORPERAZINE 25 MG/1
25 SUPPOSITORY RECTAL EVERY 12 HOURS PRN
Status: DISCONTINUED | OUTPATIENT
Start: 2024-04-19 | End: 2024-04-21 | Stop reason: HOSPADM

## 2024-04-19 RX ORDER — CHOLECALCIFEROL (VITAMIN D3) 25 MCG
1000 TABLET ORAL DAILY
Status: DISCONTINUED | OUTPATIENT
Start: 2024-04-19 | End: 2024-04-21 | Stop reason: HOSPADM

## 2024-04-19 RX ORDER — ACETAMINOPHEN 325 MG/1
975 TABLET ORAL ONCE
Status: COMPLETED | OUTPATIENT
Start: 2024-04-19 | End: 2024-04-19

## 2024-04-19 RX ORDER — ONDANSETRON 4 MG/1
4 TABLET, ORALLY DISINTEGRATING ORAL EVERY 8 HOURS PRN
Status: DISCONTINUED | OUTPATIENT
Start: 2024-04-19 | End: 2024-04-21 | Stop reason: HOSPADM

## 2024-04-19 RX ORDER — PROCHLORPERAZINE EDISYLATE 5 MG/ML
10 INJECTION INTRAMUSCULAR; INTRAVENOUS ONCE
Status: COMPLETED | OUTPATIENT
Start: 2024-04-19 | End: 2024-04-19

## 2024-04-19 RX ORDER — AMLODIPINE BESYLATE 10 MG/1
10 TABLET ORAL DAILY
Status: DISCONTINUED | OUTPATIENT
Start: 2024-04-19 | End: 2024-04-19

## 2024-04-19 RX ORDER — ONDANSETRON HYDROCHLORIDE 2 MG/ML
4 INJECTION, SOLUTION INTRAVENOUS ONCE AS NEEDED
Status: DISCONTINUED | OUTPATIENT
Start: 2024-04-19 | End: 2024-04-19 | Stop reason: HOSPADM

## 2024-04-19 RX ORDER — OXYCODONE HYDROCHLORIDE 5 MG/1
5 TABLET ORAL EVERY 4 HOURS PRN
Status: DISCONTINUED | OUTPATIENT
Start: 2024-04-19 | End: 2024-04-19 | Stop reason: HOSPADM

## 2024-04-19 RX ORDER — ONDANSETRON HYDROCHLORIDE 2 MG/ML
4 INJECTION, SOLUTION INTRAVENOUS EVERY 8 HOURS PRN
Status: DISCONTINUED | OUTPATIENT
Start: 2024-04-19 | End: 2024-04-21 | Stop reason: HOSPADM

## 2024-04-19 RX ORDER — HYDROMORPHONE HYDROCHLORIDE 1 MG/ML
INJECTION, SOLUTION INTRAMUSCULAR; INTRAVENOUS; SUBCUTANEOUS AS NEEDED
Status: DISCONTINUED | OUTPATIENT
Start: 2024-04-19 | End: 2024-04-19

## 2024-04-19 RX ORDER — BISACODYL 10 MG/1
10 SUPPOSITORY RECTAL DAILY PRN
Status: DISCONTINUED | OUTPATIENT
Start: 2024-04-19 | End: 2024-04-21 | Stop reason: HOSPADM

## 2024-04-19 RX ORDER — TAMSULOSIN HYDROCHLORIDE 0.4 MG/1
0.4 CAPSULE ORAL DAILY
Status: DISCONTINUED | OUTPATIENT
Start: 2024-04-19 | End: 2024-04-21 | Stop reason: HOSPADM

## 2024-04-19 RX ORDER — GABAPENTIN 100 MG/1
100 CAPSULE ORAL 2 TIMES DAILY
Status: DISCONTINUED | OUTPATIENT
Start: 2024-04-19 | End: 2024-04-20

## 2024-04-19 RX ORDER — ESMOLOL HYDROCHLORIDE 10 MG/ML
INJECTION INTRAVENOUS AS NEEDED
Status: DISCONTINUED | OUTPATIENT
Start: 2024-04-19 | End: 2024-04-19

## 2024-04-19 RX ORDER — CHOLECALCIFEROL (VITAMIN D3) 25 MCG
1 TABLET ORAL DAILY
COMMUNITY

## 2024-04-19 RX ORDER — LIDOCAINE HYDROCHLORIDE 10 MG/ML
0.1 INJECTION, SOLUTION EPIDURAL; INFILTRATION; INTRACAUDAL; PERINEURAL ONCE
Status: DISCONTINUED | OUTPATIENT
Start: 2024-04-19 | End: 2024-04-19 | Stop reason: HOSPADM

## 2024-04-19 RX ORDER — ASCORBIC ACID 500 MG
1000 TABLET ORAL DAILY
Status: DISCONTINUED | OUTPATIENT
Start: 2024-04-19 | End: 2024-04-21 | Stop reason: HOSPADM

## 2024-04-19 RX ORDER — ACETAMINOPHEN 500 MG
1000 TABLET ORAL EVERY 8 HOURS
COMMUNITY
Start: 2024-04-19 | End: 2024-05-03

## 2024-04-19 RX ORDER — SODIUM CHLORIDE, SODIUM LACTATE, POTASSIUM CHLORIDE, CALCIUM CHLORIDE 600; 310; 30; 20 MG/100ML; MG/100ML; MG/100ML; MG/100ML
100 INJECTION, SOLUTION INTRAVENOUS CONTINUOUS
Status: DISCONTINUED | OUTPATIENT
Start: 2024-04-19 | End: 2024-04-19 | Stop reason: SDUPTHER

## 2024-04-19 RX ORDER — CHLORPHENIRAMINE MALEATE 4 MG
1 TABLET ORAL EVERY 6 HOURS PRN
COMMUNITY

## 2024-04-19 RX ORDER — GLYCOPYRROLATE 0.2 MG/ML
INJECTION INTRAMUSCULAR; INTRAVENOUS AS NEEDED
Status: DISCONTINUED | OUTPATIENT
Start: 2024-04-19 | End: 2024-04-19

## 2024-04-19 RX ORDER — ALBUTEROL SULFATE 0.83 MG/ML
2.5 SOLUTION RESPIRATORY (INHALATION) ONCE AS NEEDED
Status: DISCONTINUED | OUTPATIENT
Start: 2024-04-19 | End: 2024-04-19 | Stop reason: HOSPADM

## 2024-04-19 RX ORDER — GABAPENTIN 100 MG/1
2 CAPSULE ORAL 2 TIMES DAILY
COMMUNITY
End: 2024-05-03 | Stop reason: SDUPTHER

## 2024-04-19 RX ORDER — BISACODYL 5 MG
10 TABLET, DELAYED RELEASE (ENTERIC COATED) ORAL DAILY PRN
Status: DISCONTINUED | OUTPATIENT
Start: 2024-04-19 | End: 2024-04-21 | Stop reason: HOSPADM

## 2024-04-19 RX ORDER — DIPHENHYDRAMINE HYDROCHLORIDE 50 MG/ML
12.5 INJECTION INTRAMUSCULAR; INTRAVENOUS EVERY 6 HOURS PRN
Status: DISCONTINUED | OUTPATIENT
Start: 2024-04-19 | End: 2024-04-21 | Stop reason: HOSPADM

## 2024-04-19 RX ORDER — ACETAMINOPHEN 325 MG/1
650 TABLET ORAL EVERY 4 HOURS PRN
Status: DISCONTINUED | OUTPATIENT
Start: 2024-04-19 | End: 2024-04-19 | Stop reason: HOSPADM

## 2024-04-19 RX ORDER — ACETAMINOPHEN 325 MG/1
975 TABLET ORAL EVERY 8 HOURS
Status: DISCONTINUED | OUTPATIENT
Start: 2024-04-19 | End: 2024-04-21 | Stop reason: HOSPADM

## 2024-04-19 RX ORDER — DIPHENHYDRAMINE HCL 12.5MG/5ML
12.5 LIQUID (ML) ORAL EVERY 6 HOURS PRN
Status: DISCONTINUED | OUTPATIENT
Start: 2024-04-19 | End: 2024-04-21 | Stop reason: HOSPADM

## 2024-04-19 RX ORDER — GABAPENTIN 300 MG/1
600 CAPSULE ORAL ONCE
Status: COMPLETED | OUTPATIENT
Start: 2024-04-19 | End: 2024-04-19

## 2024-04-19 RX ORDER — IPRATROPIUM BROMIDE 0.5 MG/2.5ML
500 SOLUTION RESPIRATORY (INHALATION) ONCE
Status: DISCONTINUED | OUTPATIENT
Start: 2024-04-19 | End: 2024-04-19 | Stop reason: HOSPADM

## 2024-04-19 RX ORDER — POLYETHYLENE GLYCOL 3350 17 G/17G
17 POWDER, FOR SOLUTION ORAL DAILY
Status: DISCONTINUED | OUTPATIENT
Start: 2024-04-19 | End: 2024-04-21 | Stop reason: HOSPADM

## 2024-04-19 RX ORDER — HYDRALAZINE HYDROCHLORIDE 20 MG/ML
10 INJECTION INTRAMUSCULAR; INTRAVENOUS ONCE
Status: DISCONTINUED | OUTPATIENT
Start: 2024-04-19 | End: 2024-04-19

## 2024-04-19 RX ORDER — CEFAZOLIN 1 G/1
INJECTION, POWDER, FOR SOLUTION INTRAVENOUS AS NEEDED
Status: DISCONTINUED | OUTPATIENT
Start: 2024-04-19 | End: 2024-04-19

## 2024-04-19 RX ORDER — ROCURONIUM BROMIDE 10 MG/ML
INJECTION, SOLUTION INTRAVENOUS AS NEEDED
Status: DISCONTINUED | OUTPATIENT
Start: 2024-04-19 | End: 2024-04-19

## 2024-04-19 RX ORDER — HYDRALAZINE HYDROCHLORIDE 20 MG/ML
10 INJECTION INTRAMUSCULAR; INTRAVENOUS ONCE
Status: COMPLETED | OUTPATIENT
Start: 2024-04-19 | End: 2024-04-19

## 2024-04-19 RX ORDER — OXYCODONE HYDROCHLORIDE 5 MG/1
10 TABLET ORAL EVERY 4 HOURS PRN
Status: DISCONTINUED | OUTPATIENT
Start: 2024-04-19 | End: 2024-04-21 | Stop reason: HOSPADM

## 2024-04-19 RX ORDER — TRANEXAMIC ACID 100 MG/ML
INJECTION, SOLUTION INTRAVENOUS AS NEEDED
Status: DISCONTINUED | OUTPATIENT
Start: 2024-04-19 | End: 2024-04-19

## 2024-04-19 RX ORDER — FENTANYL CITRATE 50 UG/ML
INJECTION, SOLUTION INTRAMUSCULAR; INTRAVENOUS AS NEEDED
Status: DISCONTINUED | OUTPATIENT
Start: 2024-04-19 | End: 2024-04-19

## 2024-04-19 RX ORDER — LOSARTAN POTASSIUM AND HYDROCHLOROTHIAZIDE 12.5; 5 MG/1; MG/1
1 TABLET ORAL
COMMUNITY
Start: 2024-04-11

## 2024-04-19 RX ORDER — MAGNESIUM SULFATE HEPTAHYDRATE 40 MG/ML
2 INJECTION, SOLUTION INTRAVENOUS ONCE
Status: COMPLETED | OUTPATIENT
Start: 2024-04-19 | End: 2024-04-19

## 2024-04-19 RX ORDER — ONDANSETRON HYDROCHLORIDE 2 MG/ML
INJECTION, SOLUTION INTRAVENOUS AS NEEDED
Status: DISCONTINUED | OUTPATIENT
Start: 2024-04-19 | End: 2024-04-19

## 2024-04-19 RX ORDER — CEFAZOLIN SODIUM 2 G/100ML
2 INJECTION, SOLUTION INTRAVENOUS EVERY 8 HOURS
Qty: 200 ML | Refills: 0 | Status: COMPLETED | OUTPATIENT
Start: 2024-04-19 | End: 2024-04-20

## 2024-04-19 RX ORDER — LABETALOL HYDROCHLORIDE 5 MG/ML
INJECTION, SOLUTION INTRAVENOUS AS NEEDED
Status: DISCONTINUED | OUTPATIENT
Start: 2024-04-19 | End: 2024-04-19

## 2024-04-19 RX ORDER — PROCHLORPERAZINE MALEATE 10 MG
10 TABLET ORAL EVERY 6 HOURS PRN
Status: DISCONTINUED | OUTPATIENT
Start: 2024-04-19 | End: 2024-04-21 | Stop reason: HOSPADM

## 2024-04-19 RX ORDER — IBUPROFEN 100 MG/5ML
1 SUSPENSION, ORAL (FINAL DOSE FORM) ORAL DAILY
COMMUNITY

## 2024-04-19 RX ORDER — METHOCARBAMOL 500 MG/1
1000 TABLET, FILM COATED ORAL 3 TIMES DAILY
Status: DISCONTINUED | OUTPATIENT
Start: 2024-04-19 | End: 2024-04-21 | Stop reason: HOSPADM

## 2024-04-19 RX ORDER — LIDOCAINE HYDROCHLORIDE 20 MG/ML
INJECTION, SOLUTION INFILTRATION; PERINEURAL AS NEEDED
Status: DISCONTINUED | OUTPATIENT
Start: 2024-04-19 | End: 2024-04-19

## 2024-04-19 RX ORDER — POLYETHYLENE GLYCOL 3350 17 G/17G
17 POWDER, FOR SOLUTION ORAL 2 TIMES DAILY PRN
COMMUNITY
Start: 2024-04-19 | End: 2024-05-19

## 2024-04-19 RX ORDER — CEFAZOLIN SODIUM 2 G/100ML
2 INJECTION, SOLUTION INTRAVENOUS ONCE
Status: DISCONTINUED | OUTPATIENT
Start: 2024-04-19 | End: 2024-04-19 | Stop reason: HOSPADM

## 2024-04-19 RX ADMIN — Medication 1000 UNITS: at 20:50

## 2024-04-19 RX ADMIN — SODIUM CHLORIDE, POTASSIUM CHLORIDE, SODIUM LACTATE AND CALCIUM CHLORIDE 100 ML/HR: 600; 310; 30; 20 INJECTION, SOLUTION INTRAVENOUS at 14:15

## 2024-04-19 RX ADMIN — METHOCARBAMOL 1000 MG: 500 TABLET ORAL at 14:12

## 2024-04-19 RX ADMIN — SUGAMMADEX 400 MG: 100 INJECTION, SOLUTION INTRAVENOUS at 11:06

## 2024-04-19 RX ADMIN — HYDROMORPHONE HYDROCHLORIDE 0.5 MG: 1 INJECTION, SOLUTION INTRAMUSCULAR; INTRAVENOUS; SUBCUTANEOUS at 11:29

## 2024-04-19 RX ADMIN — SODIUM CHLORIDE, POTASSIUM CHLORIDE, SODIUM LACTATE AND CALCIUM CHLORIDE 100 ML/HR: 600; 310; 30; 20 INJECTION, SOLUTION INTRAVENOUS at 18:09

## 2024-04-19 RX ADMIN — GABAPENTIN 100 MG: 100 CAPSULE ORAL at 20:50

## 2024-04-19 RX ADMIN — PROCHLORPERAZINE EDISYLATE 10 MG: 5 INJECTION INTRAMUSCULAR; INTRAVENOUS at 18:05

## 2024-04-19 RX ADMIN — AMLODIPINE BESYLATE 10 MG: 10 TABLET ORAL at 14:12

## 2024-04-19 RX ADMIN — CEFAZOLIN SODIUM 2 G: 2 INJECTION, SOLUTION INTRAVENOUS at 17:12

## 2024-04-19 RX ADMIN — ROCURONIUM BROMIDE 80 MG: 10 INJECTION, SOLUTION INTRAVENOUS at 09:49

## 2024-04-19 RX ADMIN — TRANEXAMIC ACID 1000 MG: 1 INJECTION, SOLUTION INTRAVENOUS at 09:50

## 2024-04-19 RX ADMIN — MAGNESIUM SULFATE HEPTAHYDRATE 2 G: 40 INJECTION, SOLUTION INTRAVENOUS at 18:05

## 2024-04-19 RX ADMIN — ESMOLOL HYDROCHLORIDE 100 MG: 100 INJECTION, SOLUTION INTRAVENOUS at 09:52

## 2024-04-19 RX ADMIN — GABAPENTIN 600 MG: 300 CAPSULE ORAL at 08:06

## 2024-04-19 RX ADMIN — DEXAMETHASONE SODIUM PHOSPHATE 10 MG: 4 INJECTION, SOLUTION INTRA-ARTICULAR; INTRALESIONAL; INTRAMUSCULAR; INTRAVENOUS; SOFT TISSUE at 10:00

## 2024-04-19 RX ADMIN — TAMSULOSIN HYDROCHLORIDE 0.4 MG: 0.4 CAPSULE ORAL at 20:51

## 2024-04-19 RX ADMIN — HYDRALAZINE HYDROCHLORIDE 25 MG: 25 TABLET ORAL at 16:02

## 2024-04-19 RX ADMIN — KETOROLAC TROMETHAMINE 15 MG: 30 INJECTION, SOLUTION INTRAMUSCULAR at 16:04

## 2024-04-19 RX ADMIN — ROCURONIUM BROMIDE 20 MG: 10 INJECTION, SOLUTION INTRAVENOUS at 10:39

## 2024-04-19 RX ADMIN — OXYCODONE HYDROCHLORIDE 10 MG: 5 TABLET ORAL at 14:12

## 2024-04-19 RX ADMIN — FENTANYL CITRATE 50 MCG: 50 INJECTION, SOLUTION INTRAMUSCULAR; INTRAVENOUS at 09:48

## 2024-04-19 RX ADMIN — HYDROMORPHONE HYDROCHLORIDE 0.5 MG: 1 INJECTION, SOLUTION INTRAMUSCULAR; INTRAVENOUS; SUBCUTANEOUS at 11:16

## 2024-04-19 RX ADMIN — ONDANSETRON 4 MG: 2 INJECTION INTRAMUSCULAR; INTRAVENOUS at 10:56

## 2024-04-19 RX ADMIN — ACETAMINOPHEN 975 MG: 325 TABLET ORAL at 08:06

## 2024-04-19 RX ADMIN — OXYCODONE HYDROCHLORIDE AND ACETAMINOPHEN 1000 MG: 500 TABLET ORAL at 20:49

## 2024-04-19 RX ADMIN — MIDAZOLAM HYDROCHLORIDE 2 MG: 1 INJECTION, SOLUTION INTRAMUSCULAR; INTRAVENOUS at 09:43

## 2024-04-19 RX ADMIN — DEXAMETHASONE SODIUM PHOSPHATE 10 MG: 4 INJECTION, SOLUTION INTRAMUSCULAR; INTRAVENOUS at 17:12

## 2024-04-19 RX ADMIN — LABETALOL HYDROCHLORIDE 10 MG: 5 INJECTION INTRAVENOUS at 11:16

## 2024-04-19 RX ADMIN — CEFAZOLIN 2 G: 330 INJECTION, POWDER, FOR SOLUTION INTRAMUSCULAR; INTRAVENOUS at 09:50

## 2024-04-19 RX ADMIN — LABETALOL HYDROCHLORIDE 10 MG: 5 INJECTION INTRAVENOUS at 10:00

## 2024-04-19 RX ADMIN — Medication 100 MCG: at 10:34

## 2024-04-19 RX ADMIN — HYDROMORPHONE HYDROCHLORIDE 0.5 MG: 1 INJECTION, SOLUTION INTRAMUSCULAR; INTRAVENOUS; SUBCUTANEOUS at 10:10

## 2024-04-19 RX ADMIN — LIDOCAINE HYDROCHLORIDE 100 MG: 20 INJECTION, SOLUTION INFILTRATION; PERINEURAL at 09:49

## 2024-04-19 RX ADMIN — GLYCOPYRROLATE 0.2 MG: 0.2 INJECTION, SOLUTION INTRAMUSCULAR; INTRAVENOUS at 09:43

## 2024-04-19 RX ADMIN — DIPHENHYDRAMINE HYDROCHLORIDE 25 MG: 50 INJECTION, SOLUTION INTRAMUSCULAR; INTRAVENOUS at 18:05

## 2024-04-19 RX ADMIN — HYDROMORPHONE HYDROCHLORIDE 0.5 MG: 1 INJECTION, SOLUTION INTRAMUSCULAR; INTRAVENOUS; SUBCUTANEOUS at 12:05

## 2024-04-19 RX ADMIN — ACETAMINOPHEN 975 MG: 325 TABLET ORAL at 16:02

## 2024-04-19 RX ADMIN — SODIUM CHLORIDE, POTASSIUM CHLORIDE, SODIUM LACTATE AND CALCIUM CHLORIDE 100 ML/HR: 600; 310; 30; 20 INJECTION, SOLUTION INTRAVENOUS at 08:07

## 2024-04-19 RX ADMIN — FENTANYL CITRATE 50 MCG: 50 INJECTION, SOLUTION INTRAMUSCULAR; INTRAVENOUS at 10:08

## 2024-04-19 RX ADMIN — HYDROMORPHONE HYDROCHLORIDE 0.5 MG: 1 INJECTION, SOLUTION INTRAMUSCULAR; INTRAVENOUS; SUBCUTANEOUS at 12:41

## 2024-04-19 RX ADMIN — TRANEXAMIC ACID 1000 MG: 1 INJECTION, SOLUTION INTRAVENOUS at 10:00

## 2024-04-19 RX ADMIN — HYDROMORPHONE HYDROCHLORIDE 0.5 MG: 1 INJECTION, SOLUTION INTRAMUSCULAR; INTRAVENOUS; SUBCUTANEOUS at 17:12

## 2024-04-19 RX ADMIN — HYDROCHLOROTHIAZIDE: 25 TABLET ORAL at 14:12

## 2024-04-19 RX ADMIN — HYDRALAZINE HYDROCHLORIDE 10 MG: 20 INJECTION INTRAMUSCULAR; INTRAVENOUS at 11:41

## 2024-04-19 RX ADMIN — ROCURONIUM BROMIDE 20 MG: 10 INJECTION, SOLUTION INTRAVENOUS at 10:10

## 2024-04-19 RX ADMIN — PROPOFOL 200 MG: 10 INJECTION, EMULSION INTRAVENOUS at 09:48

## 2024-04-19 RX ADMIN — ESMOLOL HYDROCHLORIDE 50 MG: 100 INJECTION, SOLUTION INTRAVENOUS at 10:07

## 2024-04-19 RX ADMIN — KETOROLAC TROMETHAMINE 15 MG: 30 INJECTION INTRAMUSCULAR; INTRAVENOUS at 10:56

## 2024-04-19 RX ADMIN — POLYETHYLENE GLYCOL 3350 17 G: 17 POWDER, FOR SOLUTION ORAL at 20:51

## 2024-04-19 RX ADMIN — ESMOLOL HYDROCHLORIDE 50 MG: 100 INJECTION, SOLUTION INTRAVENOUS at 10:10

## 2024-04-19 RX ADMIN — LABETALOL HYDROCHLORIDE 10 MG: 5 INJECTION INTRAVENOUS at 10:11

## 2024-04-19 RX ADMIN — METHOCARBAMOL 1000 MG: 500 TABLET ORAL at 20:50

## 2024-04-19 SDOH — SOCIAL STABILITY: SOCIAL INSECURITY: ARE THERE ANY APPARENT SIGNS OF INJURIES/BEHAVIORS THAT COULD BE RELATED TO ABUSE/NEGLECT?: NO

## 2024-04-19 SDOH — SOCIAL STABILITY: SOCIAL INSECURITY: HAVE YOU HAD THOUGHTS OF HARMING ANYONE ELSE?: NO

## 2024-04-19 SDOH — SOCIAL STABILITY: SOCIAL INSECURITY: DOES ANYONE TRY TO KEEP YOU FROM HAVING/CONTACTING OTHER FRIENDS OR DOING THINGS OUTSIDE YOUR HOME?: NO

## 2024-04-19 SDOH — SOCIAL STABILITY: SOCIAL INSECURITY: HAVE YOU HAD ANY THOUGHTS OF HARMING ANYONE ELSE?: NO

## 2024-04-19 SDOH — SOCIAL STABILITY: SOCIAL INSECURITY: ABUSE: ADULT

## 2024-04-19 SDOH — SOCIAL STABILITY: SOCIAL INSECURITY: WERE YOU ABLE TO COMPLETE ALL THE BEHAVIORAL HEALTH SCREENINGS?: YES

## 2024-04-19 SDOH — SOCIAL STABILITY: SOCIAL INSECURITY: HAS ANYONE EVER THREATENED TO HURT YOUR FAMILY OR YOUR PETS?: NO

## 2024-04-19 SDOH — SOCIAL STABILITY: SOCIAL INSECURITY: DO YOU FEEL ANYONE HAS EXPLOITED OR TAKEN ADVANTAGE OF YOU FINANCIALLY OR OF YOUR PERSONAL PROPERTY?: NO

## 2024-04-19 SDOH — SOCIAL STABILITY: SOCIAL INSECURITY: DO YOU FEEL UNSAFE GOING BACK TO THE PLACE WHERE YOU ARE LIVING?: NO

## 2024-04-19 SDOH — SOCIAL STABILITY: SOCIAL INSECURITY: ARE YOU OR HAVE YOU BEEN THREATENED OR ABUSED PHYSICALLY, EMOTIONALLY, OR SEXUALLY BY ANYONE?: NO

## 2024-04-19 ASSESSMENT — ACTIVITIES OF DAILY LIVING (ADL)
JUDGMENT_ADEQUATE_SAFELY_COMPLETE_DAILY_ACTIVITIES: YES
ADL_ASSISTANCE: INDEPENDENT
ASSISTIVE_DEVICE: EYEGLASSES
GROOMING: INDEPENDENT
WALKS IN HOME: NEEDS ASSISTANCE
FEEDING YOURSELF: INDEPENDENT
PATIENT'S MEMORY ADEQUATE TO SAFELY COMPLETE DAILY ACTIVITIES?: YES
HEARING - LEFT EAR: FUNCTIONAL
DRESSING YOURSELF: INDEPENDENT
HEARING - RIGHT EAR: FUNCTIONAL
TOILETING: INDEPENDENT
ADEQUATE_TO_COMPLETE_ADL: YES
LACK_OF_TRANSPORTATION: NO
BATHING: INDEPENDENT

## 2024-04-19 ASSESSMENT — PAIN - FUNCTIONAL ASSESSMENT
PAIN_FUNCTIONAL_ASSESSMENT: 0-10

## 2024-04-19 ASSESSMENT — PAIN SCALES - GENERAL
PAINLEVEL_OUTOF10: 10 - WORST POSSIBLE PAIN
PAINLEVEL_OUTOF10: 9
PAINLEVEL_OUTOF10: 5 - MODERATE PAIN
PAINLEVEL_OUTOF10: 0 - NO PAIN
PAINLEVEL_OUTOF10: 9
PAINLEVEL_OUTOF10: 5 - MODERATE PAIN
PAINLEVEL_OUTOF10: 5 - MODERATE PAIN
PAINLEVEL_OUTOF10: 0 - NO PAIN
PAINLEVEL_OUTOF10: 9

## 2024-04-19 ASSESSMENT — LIFESTYLE VARIABLES
SKIP TO QUESTIONS 9-10: 0
HOW OFTEN DO YOU HAVE 6 OR MORE DRINKS ON ONE OCCASION: LESS THAN MONTHLY
AUDIT-C TOTAL SCORE: 2
AUDIT-C TOTAL SCORE: 2
HOW OFTEN DO YOU HAVE A DRINK CONTAINING ALCOHOL: MONTHLY OR LESS
HOW MANY STANDARD DRINKS CONTAINING ALCOHOL DO YOU HAVE ON A TYPICAL DAY: 1 OR 2

## 2024-04-19 ASSESSMENT — COGNITIVE AND FUNCTIONAL STATUS - GENERAL
HELP NEEDED FOR BATHING: A LITTLE
HELP NEEDED FOR BATHING: A LITTLE
TOILETING: A LITTLE
STANDING UP FROM CHAIR USING ARMS: A LITTLE
DRESSING REGULAR LOWER BODY CLOTHING: A LITTLE
CLIMB 3 TO 5 STEPS WITH RAILING: A LOT
STANDING UP FROM CHAIR USING ARMS: A LITTLE
PATIENT BASELINE BEDBOUND: UNABLE TO ASSESS AT THIS TIME
CLIMB 3 TO 5 STEPS WITH RAILING: A LOT
MOVING TO AND FROM BED TO CHAIR: A LITTLE
MOVING FROM LYING ON BACK TO SITTING ON SIDE OF FLAT BED WITH BEDRAILS: A LITTLE
MOVING TO AND FROM BED TO CHAIR: A LITTLE
MOVING FROM LYING ON BACK TO SITTING ON SIDE OF FLAT BED WITH BEDRAILS: A LITTLE
WALKING IN HOSPITAL ROOM: A LITTLE
DRESSING REGULAR LOWER BODY CLOTHING: A LITTLE
TURNING FROM BACK TO SIDE WHILE IN FLAT BAD: A LITTLE
TOILETING: A LITTLE
WALKING IN HOSPITAL ROOM: A LITTLE
CLIMB 3 TO 5 STEPS WITH RAILING: A LOT
STANDING UP FROM CHAIR USING ARMS: A LITTLE
DAILY ACTIVITIY SCORE: 20
DRESSING REGULAR UPPER BODY CLOTHING: A LITTLE
TURNING FROM BACK TO SIDE WHILE IN FLAT BAD: A LITTLE
MOVING FROM LYING ON BACK TO SITTING ON SIDE OF FLAT BED WITH BEDRAILS: A LITTLE
DAILY ACTIVITIY SCORE: 20
MOBILITY SCORE: 16
MOBILITY SCORE: 17
DRESSING REGULAR UPPER BODY CLOTHING: A LITTLE
MOBILITY SCORE: 17
MOVING TO AND FROM BED TO CHAIR: A LITTLE
WALKING IN HOSPITAL ROOM: A LOT
TURNING FROM BACK TO SIDE WHILE IN FLAT BAD: A LITTLE

## 2024-04-19 ASSESSMENT — PAIN DESCRIPTION - LOCATION: LOCATION: HEAD

## 2024-04-19 ASSESSMENT — PATIENT HEALTH QUESTIONNAIRE - PHQ9
1. LITTLE INTEREST OR PLEASURE IN DOING THINGS: NOT AT ALL
SUM OF ALL RESPONSES TO PHQ9 QUESTIONS 1 & 2: 0
2. FEELING DOWN, DEPRESSED OR HOPELESS: NOT AT ALL

## 2024-04-19 ASSESSMENT — COLUMBIA-SUICIDE SEVERITY RATING SCALE - C-SSRS
1. IN THE PAST MONTH, HAVE YOU WISHED YOU WERE DEAD OR WISHED YOU COULD GO TO SLEEP AND NOT WAKE UP?: NO
6. HAVE YOU EVER DONE ANYTHING, STARTED TO DO ANYTHING, OR PREPARED TO DO ANYTHING TO END YOUR LIFE?: NO
2. HAVE YOU ACTUALLY HAD ANY THOUGHTS OF KILLING YOURSELF?: NO

## 2024-04-19 NOTE — PROGRESS NOTES
Physical Therapy    Physical Therapy Evaluation & Treatment    Patient Name: Chris Monique  MRN: [id number]  Today's Date: 4/19/2024   Time Calculation  Start Time: 1416  Stop Time: 1452  Time Calculation (min): 36 min    Assessment/Plan   PT Assessment  PT Assessment Results: Decreased strength, Decreased endurance, Impaired balance, Decreased mobility, Orthopedic restrictions, Pain  Rehab Prognosis: Good  Barriers to Discharge: None identified  Evaluation/Treatment Tolerance: Patient tolerated treatment well  Medical Staff Made Aware: Yes  Strengths: Ability to acquire knowledge, Access to adaptive/assistive products, Premorbid level of function, Support and attitude of living partners  Barriers to Participation: Insight into problems  End of Session Communication: Bedside nurse  Assessment Comment: Pt presents today with fair functional BLE strength, balance, and activity tolerance. Continued PT would benefit the pt to build upon the above factors and attempt stair negotiation to maximize independence with mobility at D/C.  End of Session Patient Position: Up in chair, Alarm on   IP OR SWING BED PT PLAN  Inpatient or Swing Bed: Inpatient  PT Plan  Treatment/Interventions: Bed mobility, Transfer training, Gait training, Stair training, Balance training, Strengthening, Therapeutic exercise, Therapeutic activity, Home exercise program, Positioning, Postural re-education  PT Plan: Skilled PT  PT Frequency: Daily  PT Discharge Recommendations: Low intensity level of continued care  Equipment Recommended upon Discharge: Wheeled walker  PT Recommended Transfer Status: Assist x1, Assistive device (Min assist)  PT - OK to Discharge: Yes (Per PT POC)    Subjective     General Visit Information:  General  Reason for Referral: 72 y/o M s/p C3-C7 Cervical laminectomy; C2-T1 Fusion on 4/19/24  Referred By: NUSRAT Jo  Past Medical History Relevant to Rehab:   Past Medical History:   Diagnosis Date    Aortic atherosclerosis  (CMS-HCC)     without abdominal aortic aneurysm, vascular US 5/3/23    Cervical spondylosis with myelopathy     HLD (hyperlipidemia)     HTN (hypertension)     Low back pain     Lumbar stenosis with neurogenic claudication     Spinal cord compression (Multi)      Family/Caregiver Present: Yes  Caregiver Feedback: Wife present  Prior to Session Communication: Bedside nurse  Patient Position Received: Bed, 3 rail up, Alarm on  Preferred Learning Style: auditory, kinesthetic  General Comment: Pt supine in bed upon PT arrival. Cleared to participate with RN, and agreeable to PT evaluation.  Home Living:  Home Living  Type of Home: House  Lives With: Spouse  Home Adaptive Equipment: Walker rolling or standard, Cane  Home Layout: 1/2 bath on main level, Bed/bath upstairs, Stairs to alternate level with rails, Two level  Alternate Level Stairs-Rails: Left  Alternate Level Stairs-Number of Steps: 13  Home Access: Stairs to enter with rails  Entrance Stairs-Rails: Right  Entrance Stairs-Number of Steps: 2  Bathroom Shower/Tub: Tub/shower unit  Home Living Comments: Pt plans to sleep on recliner chair on main level  Prior Level of Function:  Prior Function Per Pt/Caregiver Report  Level of [place]: Independent with ADLs and functional transfers  Receives Help From: Family  ADL Assistance: Independent  Homemaking Assistance: Independent (Shared IADLs with wife)  Ambulatory Assistance: Independent (Mod IND with cane)  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)  Prior Function Comments: Pt reports fall in the bathtub on Monday  Precautions:  Precautions  Medical Precautions: Fall precautions, Spinal precautions  Post-Surgical Precautions: Spinal precautions    Objective   Pain:  Pain Assessment  Pain Assessment: 0-10  Pain Score: 9  Pain Type: Surgical pain  Pain Location: Neck  Pain Orientation: Posterior  Pain Interventions: Ambulation/increased activity, Repositioned  Response to Interventions: 8/10 pain  reported  Cognition:  Cognition  Orientation Level: Oriented X4  Impulsive: Mildly    General Assessments:  Activity Tolerance  Activity Tolerance Comments: Fair tolerance to ambulatory activity    Sensation  Light Touch: No apparent deficits    Coordination  Movements are Fluid and Coordinated: Yes    Postural Control  Postural Control: Impaired  Trunk Control: Forward flexion at trunk in standing with FWW support    Static Sitting Balance  Static Sitting-Balance Support: Bilateral upper extremity supported, Feet supported  Static Sitting-Level of Assistance: Distant supervision  Static Sitting-Comment/Number of Minutes: About 5 minutes  Dynamic Sitting Balance  Dynamic Sitting-Balance Support: No upper extremity supported, Feet supported  Dynamic Sitting-Comments: Pt able to don undergarment on RLE. Assistance required for LLE    Static Standing Balance  Static Standing-Balance Support: Bilateral upper extremity supported  Static Standing-Level of Assistance: Contact guard  Static Standing-Comment/Number of Minutes: With FWW support  Dynamic Standing Balance  Dynamic Standing-Balance Support: Bilateral upper extremity supported  Dynamic Standing-Comments: Min assist with FWW support  Functional Assessments:  Bed Mobility  Bed Mobility: Yes  Bed Mobility 1  Bed Mobility 1: Supine to sitting, Log roll  Level of Assistance 1: Contact guard, Minimal verbal cues  Bed Mobility Comments 1: VC for sequenceing log roll to the right. HOB slightly elevated d/t pt report of back pain.    Stairs  Stairs: No  Extremity/Trunk Assessments:  RLE   RLE : Exceptions to WFL  Strength RLE  RLE Overall Strength: Greater than or equal to 3/5 as evidenced by functional mobility  LLE   LLE : Exceptions to WFL  Strength LLE  LLE Overall Strength: Greater than or equal to 3/5 as evidenced by functional mobility  Treatments:  Ambulation/Gait Training  Ambulation/Gait Training Performed: Yes  Ambulation/Gait Training 1  Surface 1: Level  tile  Device 1: Rolling walker  Gait Support Devices: Gait belt  Assistance 1: Minimum assistance, Minimal verbal cues, Minimal tactile cues  Comments/Distance (ft) 1: About 70 ft. Good alisa with decreased step length. Pt with gaze on floor and flexion at hips/trunk. VC/TC for more upright posture with minimal return demonstration. Decreased obstacle avoidance with x2 collision with obstacles in room while ambulating with FWW. No overt LOB  Transfers  Transfer: Yes  Transfer 1  Transfer From 1: Bed to  Transfer to 1: Stand  Technique 1: Sit to stand  Transfer Device 1: Walker, Gait belt  Transfer Level of Assistance 1: Minimum assistance, Minimal verbal cues  Trials/Comments 1: x2 trials. VC for BUE push from bed instead of pulling from walker to stand. Pt in flexion at right knee and trunk. VC/TC to correct. Pt able to minimally correct posture.  Transfers 2  Transfer From 2: Stand to  Transfer to 2: Bed  Technique 2: Stand to sit  Transfer Device 2: Walker (Gait belt)  Transfer Level of Assistance 2: Contact guard, Minimal verbal cues  Trials/Comments 2: VC for BLE positioning in front of bed before attempting to sit. VC for BUE reach for bed when sitting with return demonstration.  Transfers 3  Transfer From 3: Stand to  Transfer to 3: Chair with arms  Technique 3: Stand to sit  Transfer Device 3: Walker (Gait belt)  Transfer Level of Assistance 3: Minimum assistance, Minimal verbal cues  Trials/Comments 3: VC for BLE and walker positioning in front of the chair before sitting. VC for BUE reach for armrests of chair when sitting. Decreased eccentric control requiring assist.  Outcome Measures:  [facility] Basic Mobility  Turning from your back to your side while in a flat bed without using bedrails: A little  Moving from lying on your back to sitting on the side of a flat bed without using bedrails: A little  Moving to and from bed to chair (including a wheelchair): A little  Standing up from a chair using your  arms (e.g. wheelchair or bedside chair): A little  To walk in hospital room: A little  Climbing 3-5 steps with railing: A lot  Basic Mobility - Total Score: 17    Encounter Problems       Encounter Problems (Active)       Balance       Goal 1 (Progressing)       Start:  04/19/24    Expected End:  05/03/24       Pt performs all sitting balance with supervision and standing balance with close supervision using LRAD            Mobility       STG - Patient will navigate 4-6 steps with left HR support and CGA (Not Progressing)       Start:  04/19/24    Expected End:  05/03/24            STG - Patient will ambulate (Progressing)       Start:  04/19/24    Expected End:  05/03/24       150 ft with close supervision using LRAD            PT Problem       PT Goal 1 (Not Progressing)       Start:  04/19/24    Expected End:  05/03/24       Pt demonstrates IND in performing 2 sets of 15 reps of prescribed BLE HEP            PT Transfers       STG - Patient to transfer to and from sit to supine (Progressing)       Start:  04/19/24    Expected End:  05/03/24       Mod IND using the log roll technique         STG - Patient will transfer sit to and from stand (Progressing)       Start:  04/19/24    Expected End:  05/03/24       With close supervision using LRAD              Education Documentation  Handouts, taught by Phill Perez PT at 4/19/2024  3:55 PM.  Learner: Significant Other, Patient  Readiness: Acceptance  Method: Explanation, Demonstration, Handout  Response: Demonstrated Understanding    Precautions, taught by Phill Perez PT at 4/19/2024  3:55 PM.  Learner: Significant Other, Patient  Readiness: Acceptance  Method: Explanation, Demonstration, Handout  Response: Demonstrated Understanding    Body Mechanics, taught by Phill Perez PT at 4/19/2024  3:55 PM.  Learner: Significant Other, Patient  Readiness: Acceptance  Method: Explanation, Demonstration, Handout  Response: Demonstrated Understanding    Home  Exercise Program, taught by Phill Perez, PT at 4/19/2024  3:55 PM.  Learner: Significant Other, Patient  Readiness: Acceptance  Method: Explanation, Demonstration, Handout  Response: Demonstrated Understanding    Mobility Training, taught by Phill Perez PT at 4/19/2024  3:55 PM.  Learner: Significant Other, Patient  Readiness: Acceptance  Method: Explanation, Demonstration, Handout  Response: Demonstrated Understanding    Education Comments  No comments found.

## 2024-04-19 NOTE — ANESTHESIA PROCEDURE NOTES
Airway  Date/Time: 4/19/2024 9:50 AM  Urgency: elective    Airway not difficult    Staffing  Performed: CRNA   Authorized by: Orestes Albright MD    Performed by: JANA Solitario-HAZEL  Patient location during procedure: OR    Indications and Patient Condition  Indications for airway management: anesthesia and airway protection  Spontaneous Ventilation: absent  Sedation level: deep  Preoxygenated: yes  Patient position: sniffing  MILS maintained throughout  Mask difficulty assessment: 2 - vent by mask + OA or adjuvant +/- NMBA  Planned trial extubation    Final Airway Details  Final airway type: endotracheal airway      Successful airway: ETT  Cuffed: yes   Successful intubation technique: video laryngoscopy  Facilitating devices/methods: intubating stylet  Endotracheal tube insertion site: oral  Blade: Megha  Blade size: #4  ETT size (mm): 7.5  Cormack-Lehane Classification: grade I - full view of glottis  Placement verified by: chest auscultation and capnometry   Cuff volume (mL): 7  Measured from: lips  ETT to lips (cm): 21  Number of attempts at approach: 1

## 2024-04-19 NOTE — ANESTHESIA POSTPROCEDURE EVALUATION
Patient: Chris Monique    Procedure Summary       Date: 04/19/24 Room / Location: [facility] OR 09 / Virtual [facility] OR    Anesthesia Start: 0944 Anesthesia Stop: 1121    Procedure: C3-C7 Cervical Laminectomy; C2-T1 Fusion (Spine Cervical) Diagnosis:       Cervical spondylosis with myelopathy      Spinal cord compression (Multi)      (Cervical spondylosis with myelopathy [M47.12])      (Spinal cord compression (CMS/HCC) [G95.20])    Surgeons: Juan Jose Jo MD Responsible Provider: Orestes Albright MD    Anesthesia Type: general ASA Status: 3            Anesthesia Type: general    Vitals Value Taken Time   /83 04/19/24 1146   Temp 36 °C (96.8 °F) 04/19/24 1123   Pulse 71 04/19/24 1158   Resp 12 04/19/24 1145   SpO2 95 % 04/19/24 1158   Vitals shown include unfiled device data.    Anesthesia Post Evaluation    Patient location during evaluation: bedside  Patient participation: complete - patient participated  Level of consciousness: awake  Pain management: adequate  Airway patency: patent  Cardiovascular status: acceptable  Respiratory status: acceptable  Hydration status: acceptable  Postoperative Nausea and Vomiting: none        There were no known notable events for this encounter.

## 2024-04-19 NOTE — DISCHARGE INSTRUCTIONS
Activity  Progressively walk 2 times a day.  Weight bearing as tolerated.  No pushing, pulling, or lifting objects greater than 10 pounds.  No excessive bending, twisting. No heavy house or yard work.  You may shower when there is no drainage from the incision site for 48 hours.  You may not return to school/work until your follow up appointment.  You may not drive until your follow up appointment or while taking narcotic medication.    Wound Type: Surgical Incision  -Change the dressing daily and continue until the incision is no longer draining.   -Once the incision  has been dry for 48 hours, you may leave the dressing off and the site open to air.  -Cover the wound with an abdominal pad and secure it with paper tape around the edges.  -If there is a Prineo/Exofin dressing (mesh strip) over your incision, do not remove it with your     dressing changes. The dressing will slowly lift away from the skin over time.   -No Lotions or Creams on or around the incision site.  -No tub soaks  -You may use heat or ice to your incision sites and or back as needed for comfort.    Call the office if:  You are breathing faster than normal.  Fever of 100.4 F or higher.  Chills  Urinating less than 4 times per day.  Acting very sleepy and difficult to awaken.  Vomiting and not able to eat or drink for 12 hours  3 or more loose, watery bowel movements in 24 hours (Diarrhea)  Concerns over the appearance of your incision.    Return immediately to the ER:  Persistent bilateral leg pain and or numbness >24 hours.  Perineal numbness/sensory loss  Urinary retention >12 hours  Loss of bowel/ bladder control

## 2024-04-19 NOTE — ANESTHESIA PREPROCEDURE EVALUATION
Patient: Chris Monique    Procedure Information       Date/Time: 04/19/24 0900    Procedure: C3-C7 Cervical Laminectomy; C2-T1 Fusion (Spine Cervical)    Location: [facility] A OR 09 / [facility] OR    Surgeons: Juan Jose Jo MD            Relevant Problems   Musculoskeletal   (+) Cervical spondylosis with myelopathy   (+) Lumbar stenosis with neurogenic claudication      Circulatory   (+) Aortic atherosclerosis (CMS-HCC)       Clinical information reviewed:   Tobacco  Allergies  Meds   Med Hx  Surg Hx   Fam Hx  Soc Hx        NPO Detail:  NPO/Void Status  Carbohydrate Drink Given Prior to Surgery? : N  Date of Last Liquid: 04/18/24  Time of Last Liquid: 2100  Date of Last Solid: 04/18/24  Time of Last Solid: 2100  Last Intake Type: Clear fluids  Time of Last Void: 0736         Physical Exam    Airway  Mallampati: II     Cardiovascular   Rhythm: regular  Rate: normal     Dental    Pulmonary   Breath sounds clear to auscultation     Abdominal            Anesthesia Plan    History of general anesthesia?: yes  History of complications of general anesthesia?: no    ASA 3     general     intravenous induction   Anesthetic plan and risks discussed with patient.    Plan discussed with CRNA.

## 2024-04-19 NOTE — OP NOTE
C3-C7 Cervical Laminectomy; C2-T1 Fusion Operative Note     Date: 2024  OR Location: [facility] A OR    Name: Chris Monique, : 1950, Age: 73 y.o., MRN: [id number], Sex: male    Diagnosis  Pre-op Diagnosis     * Cervical spondylosis with myelopathy [M47.12]     * Spinal cord compression (Multi) [G95.20] Post-op Diagnosis     * Cervical spondylosis with myelopathy [M47.12]     * Spinal cord compression (Multi) [G95.20]     Procedures  C3-7 laminectomy and fusion    FL GLASER FACETECTOMY & FORAMOTOMY 1 VRT SGM CERVICAL [53818]  FL ALLOGRAFT FOR SPINE SURGERY ONLY MORSELIZED []  FL POSTERIOR SEGMENTAL INSTRUMENTATION 7-12 VRT SEG [21096]  Surgeons      * Juan Jose Jo - Primary    Resident/Fellow/Other Assistant:  Surgeons and Role:     * Jasmeet Hernández MD - Resident - Assisting    Procedure Summary  Anesthesia: General  ASA: III  Anesthesia Staff: Anesthesiologist: Orestes Albright MD  CRNA: JANA Solitario-CRNA  Estimated Blood Loss: 75mL  Intra-op Medications:   Administrations occurring from 0900 to 1100 on 24:   Medication Name Total Dose   lactated Ringer's infusion 150 mL              Anesthesia Record               Intraprocedure I/O Totals          Intake    Tranexamic Acid 0.00 mL    The total shown is the total volume documented since Anesthesia Start was filed.    lactated Ringer's infusion 1000.00 mL    Total Intake 1000 mL          Specimen: No specimens collected     Staff:   Circulator: Matt Abernathy RN  Relief Circulator: Yogi Loza RN  Relief Scrub: Isadora Hightower  Scrub Person: Amrita Mistry         Drains and/or Catheters:   Closed/Suction Drain 1 Posterior Neck Bulb 7 Fr. (Active)   Dressing Status Clean;Dry 24 1159   Drainage Appearance Bloody 24 1159   Status To bulb suction 24 1159   Output (mL) 100 mL 24 1300       Tourniquet Times:         Implants:  Implants       Type Name Action Serial No.      Screw PUTTY ATTRAX, 5CC US - SN/A -  [id number] Implanted N/A     Screw 3.5X 14 FA Implanted N/A     Implant 3.5X 14 MA Implanted N/A     Screw RELINE C LOCKSCREW Implanted N/A     Donald RECLINE C RODS Implanted N/A              Findings: Severe central stenosis C3-7    Indications: Chris Monique is an 73 y.o. male who is having surgery for Cervical spondylosis with myelopathy [M47.12]  Spinal cord compression (CMS/HCC) [G95.20].  Patient presented with progressively worsening gait instability and fine motor disruption.  Objectively and subjectively had symptoms and signs of cervical myelopathy.  MRI demonstrated critical central stenosis with severe spinal cord compression from C3-7.    The patient was seen in the preoperative area. The risks, benefits, complications, treatment options, non-operative alternatives, expected recovery and outcomes were discussed with the patient. The possibilities of reaction to medication, pulmonary aspiration, injury to surrounding structures, bleeding, recurrent infection, the need for additional procedures, failure to diagnose a condition, and creating a complication requiring transfusion or operation were discussed with the patient. The patient concurred with the proposed plan, giving informed consent.  The site of surgery was properly noted/marked if necessary per policy. The patient has been actively warmed in preoperative area. Preoperative antibiotics have been ordered and given within 1 hours of incision. Venous thrombosis prophylaxis have been ordered including bilateral sequential compression devices    Procedure Details:   Patient was brought back to the operating room and general anesthesia was induced.  SCDs were placed on the lower extremities.  Leads for spinal cord monitoring using somatosensory evoked potentials were placed on the upper and lower extremities.   Patient was then placed in the [place] head martinez and positioned prone on the table with the head attached in a neutral position.  All bony  prominences were well padded, the arms were padded and tucked at the side.  Baseline neuro monitoring signals were obtained.  The posterior cervical spine was then prepped and draped in the usual sterile fashion.  A time-out was performed and preoperative antibiotics were given.      A midline incision was made.  Spinous processes were identified in the midline.  Subperiosteal dissection was carried out exposing the dorsal elements from C3-7.  Deep retractors were then placed.      Lateral mass instrumentation was then placed from C3-7 in a standard fashion.  High-speed drill was used to create a  hole for a screw beginning at the center of the lateral mass aiming cephalad and lateral.  The holes were sounded for 4 walls and a bottom using a ball-tipped probe.  A 3.5 millimeter screws were then inserted bilaterally.      2 rods were then fashioned into lordosis and held into place with locking caps.  The caps were finally tightened using a torque wrench.  Final lateral imaging was obtained verifying correct operative levels and placement of the screws.    The spinous processes were excised from C4-6, as well as the inferior portion of C3 and superior portion of C7.  A high-speed bur was then used to create a laminectomy trough bilaterally, extending through each of the lamina from C4 through 6.  A dome laminectomy was performed removing the inferior 50% of C3.  Additional dome laminectomy was performed at C7 excising the superior 50%.  The lamina were then elevated off the underlying dura in a lobster tail fashion.  All soft tissue attachments were carefully taken down using angled curettes and small Kerrison rongeur.  There was evidence of significant spinal cord compression, and the dura had puffed out nicely at this point.  Epidural hemostasis was obtained using FloSeal and bipolar electrocautery.      The wound was then copiously irrigated using saline.  The dorsal elements from C3-7 were decorticated using  a high-speed bur.  10 cubic centimeters of Attrax bone graft substitute was then divided equally among the patient's right and left side to complete the fusion.     The central portion of the wound was once again irrigated.  Meticulous hemostasis was obtained.  The wound was closed in layers over suction drain.  Dermabond dressing was then applied, dry sterile dressing was applied on top of that.      The patient was then transferred off the OR table and awakened without difficulty.  There were no complications during the case.  I was present and scrubbed for the entire operation.  Spinal cord monitoring signals remained at baseline throughout the entire operation.    Complications:  None; patient tolerated the procedure well.    Disposition: PACU - hemodynamically stable.  Condition: stable         Additional Details:     Attending Attestation: I was present for the entire procedure.    Juan Jose Jo  Phone Number: [phone]

## 2024-04-19 NOTE — H&P
[facility] History and Physical      Chris Monique    :  1950(73 y.o.)    MRN:  [id number]  Date: 24     Assessment and Plan:      HTN  - continue norvasc, losartan-hydrochlorothiazide (was told to stop lisinopril-hydrochlorothiazide due to side effect of cough)  - does not measure home BP. BP at PAT visit was 142/73  - hold on further titration of BP meds in inpatient setting as acute pain/headache; should get home BP cuff and keep home BP log for PCP to review. Take log to PCP follow up visit in 1-2 weeks  - Avoid NSAIDs (including IV toradol) for pain as will increase BP; steroids also could increase BP but surgery only plans short course post op    Headache  - no focal deficits; hx of recurring headaches 2x week prior to surgery. Does not need further imaging at present.  - trial headache cocktail (IV magnesium, benadryl, compazine)    HLD  Aortic atherosclerosis  - not on any lipid lowering meds    BPH  - continue flomax      BMI Classification: Body mass index is 30.59 kg/m². - obesity BMI 30-39.9    Disposition: will monitor BP overnight    The patient/family had opportunity to ask questions. All questions were answered to the best of my ability.    Between 7AM-7PM please message me via Epic Secure Chat.  After 7PM please page Nocturnist on call.    Electronically signed by Candelario Mart DO on 24 at 5:54 PM     Subjective:      Chief Complaint: post op med mgmt  PCP: Carson Wesley MD     HPI:    Chris Monique is a 73 y.o. male with Cervical spondylosis with myelopathy and  spinal cord compression.  Patient presented to OP office with progressively worsening gait instability and fine motor disruption.  Objectively and subjectively had symptoms and signs of cervical myelopathy.  MRI demonstrated critical central stenosis with severe spinal cord compression from C3-7. Taken to OR today for C3-7 laminectomy and fusion.     Medicine consulted for post op mgmt of hypertension. Patient with  elevated BP post op. Since transfer to [facility] -161, DBP 85-95. BP remains elevated despite getting norvasc 10 mg, lisinopril 20 mg, hydrochlorothiazide 25  mg, hydralazine 25 mg.     Patient had headache but no other complaints. Had some nausea but no vomiting. No chest pain or shortness of breath.       Past Medical History:   Diagnosis Date    Aortic atherosclerosis (CMS-HCC)     without abdominal aortic aneurysm, vascular US 5/3/23    Cervical spondylosis with myelopathy     HLD (hyperlipidemia)     HTN (hypertension)     Low back pain     Lumbar stenosis with neurogenic claudication     Spinal cord compression (Multi)        Past Surgical History:   Procedure Laterality Date    COLONOSCOPY      HERNIA REPAIR      SHOULDER OPEN ROTATOR CUFF REPAIR Left     TOTAL KNEE ARTHROPLASTY Bilateral        Family History   Problem Relation Name Age of Onset    Dementia Mother      Prostate cancer Father      Hydrocephalus Sister      Dementia Brother      Hydrocephalus Brother      Kidney disease Brother         Social History     Socioeconomic History    Marital status: Single     Spouse name: Not on file    Number of children: Not on file    Years of education: Not on file    Highest education level: Not on file   Occupational History    Not on file   Tobacco Use    Smoking status: Former     Current packs/day: 0.00     Average packs/day: 0.3 packs/day for 5.0 years (1.3 ttl pk-yrs)     Types: Cigarettes     Start date:      Quit date:      Years since quittin.3    Smokeless tobacco: Never   Vaping Use    Vaping status: Never Used   Substance and Sexual Activity    Alcohol use: Yes     Alcohol/week: 3.0 standard drinks of alcohol     Types: 3 Glasses of wine per week    Drug use: Never    Sexual activity: Not on file   Other Topics Concern    Not on file   Social History Narrative    Not on file     Social Determinants of Health     Financial Resource Strain: Low Risk  (2024)    Overall Financial  Resource Strain (CARDIA)     Difficulty of Paying Living Expenses: Not hard at all   Food Insecurity: Not on file   Transportation Needs: No Transportation Needs (4/19/2024)    PRAPARE - Transportation     Lack of Transportation (Medical): No     Lack of Transportation (Non-Medical): No   Physical Activity: Not on file   Stress: Not on file   Social Connections: Not on file   Intimate Partner Violence: Not on file   Housing Stability: Low Risk  (4/19/2024)    Housing Stability Vital Sign     Unable to Pay for Housing in the Last Year: No     Number of Places Lived in the Last Year: 1     Unstable Housing in the Last Year: No       No Known Allergies    Prior to Admission medications    Medication Sig Start Date End Date Taking? Authorizing Provider   amLODIPine (Norvasc) 10 mg tablet Take 1 tablet (10 mg) by mouth once daily.   Yes Historical Provider, MD   ascorbic acid (Vitamin C) 500 mg/5 mL liquid Take by mouth once daily.   Yes Historical Provider, MD   calcium carbonate-vitamin D3 500 mg-5 mcg (200 unit) tablet Take 1 tablet by mouth once daily.   Yes Historical Provider, MD   cyanocobalamin (Vitamin B-12) 250 mcg tablet Take 1 tablet (250 mcg) by mouth once daily.   Yes Historical Provider, MD   ibuprofen 400 mg tablet Take 1 tablet (400 mg) by mouth every 6 hours if needed for moderate pain (4 - 6).   Yes Historical Provider, MD   lisinopriL-hydrochlorothiazide 20-25 mg tablet Take 1 tablet by mouth once daily.   Yes Historical Provider, MD   multivitamin tablet Take 1 tablet by mouth once daily.   Yes Historical Provider, MD   vitamin E 450 mg (1000 unit) capsule Take by mouth once daily.   Yes Historical Provider, MD   acetaminophen (Tylenol Extra Strength) 500 mg tablet Take 2 tablets (1,000 mg) by mouth every 8 hours for 14 days. 4/19/24 5/3/24  Juan Jose Jo MD   chlorhexidine (Peridex) 0.12 % solution Swish for 30 seconds and spit 15mL of solution the night before and morning of surgery  Patient  not taking: Reported on 4/19/2024 4/12/24   Jacquelyn Juárez PA-C   methocarbamol (Robaxin) 500 mg tablet Take 1-2 tabs every 8 hours as needed for spasms 4/19/24   Juan Jose Jo MD   oxyCODONE (Roxicodone) 5 mg immediate release tablet Take 1 tablet (5 mg) by mouth every 4 hours if needed for severe pain (7 - 10) for up to 7 days. 4/19/24 4/26/24  Juan Jose Jo MD   polyethylene glycol (Miralax) 17 gram/dose powder Take 17 g by mouth 2 times a day as needed (constipation). 4/19/24 5/19/24  Juan Jose Jo MD       Review of systems:   Other than patient's chronic conditions and those complaints in the history above, the rest of the 10 systems review were done and were negative.     Objective:      Vitals:    04/19/24 1330 04/19/24 1403 04/19/24 1549 04/19/24 1709   BP: (!) 155/93 170/88 174/85 (!) 161/95   BP Location: Right arm  Right arm    Patient Position: Lying  Sitting    Pulse: 78 85 75    Resp: 12  18    Temp:  36.7 °C (98 °F) 36 °C (96.8 °F)    TempSrc:  Temporal Temporal    SpO2: 94% 95% 98%    Weight:       Height:            Physical Exam  Vitals and nursing note reviewed.   HENT:      Mouth/Throat:      Mouth: Mucous membranes are moist.      Pharynx: Oropharynx is clear.   Neck:      Comments: Soft C Collar in place  Cardiovascular:      Rate and Rhythm: Normal rate and regular rhythm.   Pulmonary:      Effort: Pulmonary effort is normal.   Abdominal:      Palpations: Abdomen is soft.      Tenderness: There is no abdominal tenderness.   Neurological:      General: No focal deficit present.      Mental Status: He is alert and oriented to person, place, and time.      Cranial Nerves: No cranial nerve deficit.      Sensory: No sensory deficit.      Motor: No weakness.         Labs:   Lab Results   Component Value Date     04/12/2024    K 3.8 04/12/2024     04/12/2024    CO2 31 04/12/2024    BUN 16 04/12/2024    CREATININE 0.79 04/12/2024    GLUCOSE 102 (H) 04/12/2024    CALCIUM  9.2 04/12/2024       Lab Results   Component Value Date    WBC 5.8 04/12/2024    HGB 13.5 04/12/2024    HCT 40.7 (L) 04/12/2024    MCV 89 04/12/2024     04/12/2024       Imaging:   No results found.

## 2024-04-20 PROCEDURE — 2500000006 HC RX 250 W HCPCS SELF ADMINISTERED DRUGS (ALT 637 FOR ALL PAYERS): Performed by: HOSPITALIST

## 2024-04-20 PROCEDURE — 9420000001 HC RT PATIENT EDUCATION 5 MIN

## 2024-04-20 PROCEDURE — 97530 THERAPEUTIC ACTIVITIES: CPT | Mod: GP,CQ

## 2024-04-20 PROCEDURE — 1100000001 HC PRIVATE ROOM DAILY

## 2024-04-20 PROCEDURE — 2500000001 HC RX 250 WO HCPCS SELF ADMINISTERED DRUGS (ALT 637 FOR MEDICARE OP): Performed by: HOSPITALIST

## 2024-04-20 PROCEDURE — 97110 THERAPEUTIC EXERCISES: CPT | Mod: GP,CQ

## 2024-04-20 PROCEDURE — 97165 OT EVAL LOW COMPLEX 30 MIN: CPT | Mod: GO

## 2024-04-20 PROCEDURE — 99232 SBSQ HOSP IP/OBS MODERATE 35: CPT | Performed by: HOSPITALIST

## 2024-04-20 PROCEDURE — 2500000004 HC RX 250 GENERAL PHARMACY W/ HCPCS (ALT 636 FOR OP/ED): Performed by: ORTHOPAEDIC SURGERY

## 2024-04-20 PROCEDURE — 2500000001 HC RX 250 WO HCPCS SELF ADMINISTERED DRUGS (ALT 637 FOR MEDICARE OP): Performed by: ORTHOPAEDIC SURGERY

## 2024-04-20 PROCEDURE — 97116 GAIT TRAINING THERAPY: CPT | Mod: GP,CQ

## 2024-04-20 RX ORDER — GABAPENTIN 100 MG/1
200 CAPSULE ORAL 2 TIMES DAILY
Status: DISCONTINUED | OUTPATIENT
Start: 2024-04-20 | End: 2024-04-21 | Stop reason: HOSPADM

## 2024-04-20 RX ADMIN — SODIUM CHLORIDE, POTASSIUM CHLORIDE, SODIUM LACTATE AND CALCIUM CHLORIDE 100 ML/HR: 600; 310; 30; 20 INJECTION, SOLUTION INTRAVENOUS at 13:59

## 2024-04-20 RX ADMIN — GABAPENTIN 200 MG: 100 CAPSULE ORAL at 21:09

## 2024-04-20 RX ADMIN — CYANOCOBALAMIN TAB 500 MCG 250 MCG: 500 TAB at 10:16

## 2024-04-20 RX ADMIN — METHOCARBAMOL 1000 MG: 500 TABLET ORAL at 10:16

## 2024-04-20 RX ADMIN — CEFAZOLIN SODIUM 2 G: 2 INJECTION, SOLUTION INTRAVENOUS at 02:17

## 2024-04-20 RX ADMIN — METHOCARBAMOL 1000 MG: 500 TABLET ORAL at 15:12

## 2024-04-20 RX ADMIN — ACETAMINOPHEN 975 MG: 325 TABLET ORAL at 15:12

## 2024-04-20 RX ADMIN — Medication 1000 UNITS: at 10:16

## 2024-04-20 RX ADMIN — OXYCODONE HYDROCHLORIDE 10 MG: 5 TABLET ORAL at 05:24

## 2024-04-20 RX ADMIN — TAMSULOSIN HYDROCHLORIDE 0.4 MG: 0.4 CAPSULE ORAL at 10:16

## 2024-04-20 RX ADMIN — OXYCODONE HYDROCHLORIDE 5 MG: 5 TABLET ORAL at 18:48

## 2024-04-20 RX ADMIN — OXYCODONE HYDROCHLORIDE AND ACETAMINOPHEN 1000 MG: 500 TABLET ORAL at 10:16

## 2024-04-20 RX ADMIN — ACETAMINOPHEN 975 MG: 325 TABLET ORAL at 00:18

## 2024-04-20 RX ADMIN — LOSARTAN POTASSIUM: 50 TABLET, FILM COATED ORAL at 10:15

## 2024-04-20 RX ADMIN — ACETAMINOPHEN 975 MG: 325 TABLET ORAL at 08:38

## 2024-04-20 RX ADMIN — DEXAMETHASONE SODIUM PHOSPHATE 10 MG: 4 INJECTION, SOLUTION INTRAMUSCULAR; INTRAVENOUS at 13:59

## 2024-04-20 RX ADMIN — METHOCARBAMOL 1000 MG: 500 TABLET ORAL at 21:09

## 2024-04-20 RX ADMIN — HYDROMORPHONE HYDROCHLORIDE 0.5 MG: 1 INJECTION, SOLUTION INTRAMUSCULAR; INTRAVENOUS; SUBCUTANEOUS at 08:38

## 2024-04-20 RX ADMIN — POLYETHYLENE GLYCOL 3350 17 G: 17 POWDER, FOR SOLUTION ORAL at 10:16

## 2024-04-20 RX ADMIN — AMLODIPINE BESYLATE 5 MG: 5 TABLET ORAL at 10:16

## 2024-04-20 RX ADMIN — GABAPENTIN 100 MG: 100 CAPSULE ORAL at 10:16

## 2024-04-20 ASSESSMENT — COGNITIVE AND FUNCTIONAL STATUS - GENERAL
STANDING UP FROM CHAIR USING ARMS: A LITTLE
MOVING TO AND FROM BED TO CHAIR: A LITTLE
DRESSING REGULAR LOWER BODY CLOTHING: A LITTLE
MOBILITY SCORE: 21
DRESSING REGULAR UPPER BODY CLOTHING: A LITTLE
TURNING FROM BACK TO SIDE WHILE IN FLAT BAD: A LITTLE
HELP NEEDED FOR BATHING: A LITTLE
MOVING FROM LYING ON BACK TO SITTING ON SIDE OF FLAT BED WITH BEDRAILS: A LITTLE
DAILY ACTIVITIY SCORE: 24
MOBILITY SCORE: 20
DAILY ACTIVITIY SCORE: 24
MOVING TO AND FROM BED TO CHAIR: A LITTLE
MOVING FROM LYING ON BACK TO SITTING ON SIDE OF FLAT BED WITH BEDRAILS: A LITTLE
MOVING FROM LYING ON BACK TO SITTING ON SIDE OF FLAT BED WITH BEDRAILS: A LITTLE
DAILY ACTIVITIY SCORE: 21
MOBILITY SCORE: 21
MOVING TO AND FROM BED TO CHAIR: A LITTLE
TURNING FROM BACK TO SIDE WHILE IN FLAT BAD: A LITTLE
TURNING FROM BACK TO SIDE WHILE IN FLAT BAD: A LITTLE

## 2024-04-20 ASSESSMENT — PAIN - FUNCTIONAL ASSESSMENT
PAIN_FUNCTIONAL_ASSESSMENT: 0-10

## 2024-04-20 ASSESSMENT — ACTIVITIES OF DAILY LIVING (ADL): ADL_ASSISTANCE: INDEPENDENT

## 2024-04-20 ASSESSMENT — PAIN SCALES - GENERAL
PAINLEVEL_OUTOF10: 8
PAINLEVEL_OUTOF10: 7
PAINLEVEL_OUTOF10: 5 - MODERATE PAIN
PAINLEVEL_OUTOF10: 5 - MODERATE PAIN
PAINLEVEL_OUTOF10: 7

## 2024-04-20 ASSESSMENT — PAIN DESCRIPTION - ORIENTATION
ORIENTATION: POSTERIOR
ORIENTATION: POSTERIOR

## 2024-04-20 ASSESSMENT — PAIN DESCRIPTION - DESCRIPTORS: DESCRIPTORS: ACHING

## 2024-04-20 ASSESSMENT — PAIN DESCRIPTION - LOCATION
LOCATION: NECK
LOCATION: NECK

## 2024-04-20 NOTE — PROGRESS NOTES
Occupational Therapy    Evaluation    Patient Name: Chris Monique  MRN: [id number]  Today's Date: 4/20/2024  Time Calculation  Start Time: 1410  Stop Time: 1421  Time Calculation (min): 11 min        Assessment:  OT Assessment: Pt presents to occupational therapy evaluation appearing to be at functional baseline in regards to ADLs, IADLs and functional mobility. Pt has good knowledge of his post-operative precautions, is able to successfully complete ADLs this admission and does not require skilled occupational therapy. OT to d/c.  Prognosis: Good  Barriers to Discharge: None  Evaluation/Treatment Tolerance: Patient tolerated treatment well  Medical Staff Made Aware: Yes  End of Session Communication: Bedside nurse  End of Session Patient Position: Bed, 3 rail up, Up in chair, Alarm on  OT Assessment Results: Decreased ADL status, Decreased upper extremity strength, Decreased safe judgment during ADL  Prognosis: Good  Barriers to Discharge: None  Evaluation/Treatment Tolerance: Patient tolerated treatment well  Medical Staff Made Aware: Yes  Strengths: Ability to acquire knowledge  Barriers to Participation: Comorbidities  Plan:  Treatment Interventions: ADL retraining, Functional transfer training, UE strengthening/ROM  No Skilled OT: At baseline function  OT Frequency: OT eval only  OT Discharge Recommendations: No further acute OT, No OT needed after discharge  Equipment Recommended upon Discharge: Wheeled walker  OT Recommended Transfer Status: Independent, Assistive equipment (Comment)  OT - OK to Discharge: Yes (Per POC)  Treatment Interventions: ADL retraining, Functional transfer training, UE strengthening/ROM    Subjective   Current Problem:  1. Cervical spondylosis with myelopathy  XR cervical spine 1 view    XR cervical spine 1 view    acetaminophen (Tylenol Extra Strength) 500 mg tablet    polyethylene glycol (Miralax) 17 gram/dose powder    oxyCODONE (Roxicodone) 5 mg immediate release tablet     methocarbamol (Robaxin) 500 mg tablet    CANCELED: FL less than 1 hour    CANCELED: FL less than 1 hour      2. Spinal cord compression (Multi)          General:  General  Reason for Referral: 74 y/o M s/p C3-C7 Cervical laminectomy; C2-T1 Fusion on 4/19/24  Referred By: NUSRAT Jo  Past Medical History Relevant to Rehab: Aortic atherosclerosis, spinal cord compression  Family/Caregiver Present: No  Prior to Session Communication: Bedside nurse  Patient Position Received: Up in chair, Alarm on  Preferred Learning Style: verbal  General Comment: Pt seated in chair with chair alarm activated. Cooperative and agreeable with OT evaluation. OK per RN.  Precautions:  Medical Precautions: Fall precautions  Post-Surgical Precautions: Spinal precautions  Vital Signs:     Pain:  Pain Assessment  Pain Assessment: 0-10  Pain Score: 8  Pain Type: Acute pain  Pain Location: Neck  Pain Descriptors: Aching    Objective   Cognition:  Overall Cognitive Status: Within Functional Limits  Orientation Level: Oriented X4  Attention: Within Functional Limits  Safety/Judgement: Within Functional Limits  Impulsive: Mildly           Home Living:  Type of Home: House  Lives With: Spouse  Home Adaptive Equipment: Walker rolling or standard, Cane  Home Layout: 1/2 bath on main level, Bed/bath upstairs, Stairs to alternate level with rails, Two level  Alternate Level Stairs-Rails: Left  Alternate Level Stairs-Number of Steps: 13  Home Access: Stairs to enter with rails  Entrance Stairs-Rails: Right  Entrance Stairs-Number of Steps: 2  Bathroom Shower/Tub: Tub/shower unit  Home Living Comments: Pt plans to sleep in recliner chair on main level  Prior Function:  Level of [place]: Independent with ADLs and functional transfers, Independent with homemaking with ambulation  Receives Help From: Family  ADL Assistance: Independent  Homemaking Assistance: Independent  Ambulatory Assistance: Independent  Prior Function Comments: Pt reports fall in the  bathtub on Monday  IADL History:  Homemaking Responsibilities:  (Spouse mostly manages)  ADL:  Eating Assistance: Independent  Grooming Assistance: Independent  UE Dressing Assistance: Independent  LE Dressing Assistance: Independent  Activity Tolerance:     Bed Mobility/Transfers: Bed Mobility  Bed Mobility: No    Transfers  Transfer: Yes  Transfer 1  Transfer From 1: Sit to  Transfer to 1: Stand  Technique 1: Sit to stand  Transfer Device 1: Walker  Transfer Level of Assistance 1: Close supervision  Transfers 2  Transfer From 2: Stand to  Transfer to 2: Sit  Technique 2: Stand to sit  Transfer Device 2: Walker  Transfer Level of Assistance 2: Contact guard      Functional Mobility:  Functional Mobility  Functional Mobility Performed: Yes (Pt performs functional mobility in patient's room with use of ww. Occasional verbal cueing for impulsivity.)  Sitting Balance:  Static Sitting Balance  Static Sitting-Balance Support: No upper extremity supported  Static Sitting-Level of Assistance: Independent  Dynamic Sitting Balance  Dynamic Sitting-Balance Support: No upper extremity supported  Dynamic Sitting-Balance: Forward lean  Standing Balance:  Static Standing Balance  Static Standing-Balance Support: Bilateral upper extremity supported  Static Standing-Level of Assistance: Contact guard  Dynamic Standing Balance  Dynamic Standing-Balance Support: Bilateral upper extremity supported  Dynamic Standing-Balance: Turning  Dynamic Standing-Comments: CGA   Modalities:     Vision:Vision - Basic Assessment  Current Vision: No visual deficits  Sensation:  Light Touch: No apparent deficits  Strength:     Perception:     Coordination:  Movements are Fluid and Coordinated: Yes   Hand Function:  Gross Grasp: Functional  Coordination: Functional      Outcome Measures:[facility] Daily Activity  Putting on and taking off regular lower body clothing: None  Bathing (including washing, rinsing, drying): None  Putting on and taking off regular  upper body clothing: None  Toileting, which includes using toilet, bedpan or urinal: None  Taking care of personal grooming such as brushing teeth: None  Eating Meals: None  Daily Activity - Total Score: 24        Education Documentation  Body Mechanics, taught by Richa Gómez OT at 4/20/2024  2:55 PM.  Learner: Patient  Readiness: Eager  Method: Explanation, Demonstration  Response: Verbalizes Understanding    Precautions, taught by Richa Gómez OT at 4/20/2024  2:55 PM.  Learner: Patient  Readiness: Eager  Method: Explanation, Demonstration  Response: Verbalizes Understanding    Education Comments  No comments found.        OP EDUCATION:  Education  Individual(s) Educated: Patient  Education Provided: Anatomy & Physiology, Diagnosis & Precautions  Patient/Caregiver Demonstrated Understanding: yes  Plan of Care Discussed and Agreed Upon: yes  Patient Response to Education: Patient/Caregiver Verbalized Understanding of Information

## 2024-04-20 NOTE — CONSULTS
Pharmacy Medication History Review    Chris Monique is a 73 y.o. male admitted for No Principal Problem: There is no principal problem currently on the Problem List. Please update the Problem List and refresh.. Pharmacy reviewed the patient's idiob-bu-hyzgaltbf medications and allergies for accuracy.    The list below reflectives the updated PTA list. Please review each medication in order reconciliation for additional clarification and justification.  Medications Prior to Admission   Medication Sig Dispense Refill Last Dose    amLODIPine (Norvasc) 5 mg tablet Take 1 tablet (5 mg) by mouth once daily.   4/18/2024    ascorbic acid (Vitamin C) 1,000 mg tablet Take 1 tablet (1,000 mg) by mouth once daily.   4/18/2024    cyanocobalamin (Vitamin B-12) 500 mcg tablet Take 1 tablet (500 mcg) by mouth once daily.   Past Week    gabapentin (Neurontin) 100 mg capsule Take 2 capsules (200 mg) by mouth 2 times a day.   Past Week    ibuprofen 400 mg tablet Take 1 tablet (400 mg) by mouth every 6 hours if needed for moderate pain (4 - 6).   Past Week    losartan-hydrochlorothiazide (Hyzaar) 50-12.5 mg tablet Take 1 tablet by mouth once daily.       multivitamin tablet Take 1 tablet by mouth once daily.   Past Week    tamsulosin (Flomax) 0.4 mg 24 hr capsule Take 1 capsule (0.4 mg) by mouth once daily.   4/18/2024    vitamin E 450 mg (1000 unit) capsule Take by mouth once daily.   Past Week    chlorpheniramine (Chlor-Trimeton) 4 mg tablet Take 1 tablet (4 mg) by mouth every 6 hours if needed for allergies.       cholecalciferol (Vitamin D3) 25 MCG (1000 UT) tablet Take 1 tablet (1,000 Units) by mouth once daily.       [DISCONTINUED] chlorhexidine (Peridex) 0.12 % solution Swish for 30 seconds and spit 15mL of solution the night before and morning of surgery (Patient not taking: Reported on 4/19/2024) 475 mL 0 Not Taking          The list below reflectives the updated allergy list. Please review each documented allergy for  additional clarification and justification.  Allergies  Reviewed by Lorie Beckman RN on 4/19/2024   No Known Allergies         Below are additional concerns with the patient's PTA list.  Patient is still currently taking lisinopril/hydrochlorothiazide 20-25 mg tablet daily. He said his wife picked up his new prescription of the losartan/hydrochlorothiazide 50-12.5 mg but he hasn't started taking it yet.     Confirmed medication list with patient.     Nicole Hayden, PharmD

## 2024-04-20 NOTE — PROGRESS NOTES
Physical Therapy    Physical Therapy Treatment    Patient Name: Chris Monique  MRN: [id number]  Today's Date: 4/20/2024  Time Calculation  Start Time: 0947  Stop Time: 1030  Time Calculation (min): 43 min       Assessment/Plan   PT Assessment  PT Assessment Results: Decreased strength, Decreased endurance, Impaired balance, Decreased mobility, Orthopedic restrictions, Pain  Rehab Prognosis: Good  Barriers to Discharge: None identified  Evaluation/Treatment Tolerance: Patient tolerated treatment well  End of Session Communication: Bedside nurse  End of Session Patient Position: Bed, 2 rail up, Alarm on     PT Plan  Treatment/Interventions: Bed mobility, Transfer training, Gait training, Stair training, Balance training, Neuromuscular re-education, Strengthening, Endurance training, Range of motion, Therapeutic exercise, Therapeutic activity, Home exercise program  PT Plan: Skilled PT  PT Frequency: Daily  PT Discharge Recommendations: Low intensity level of continued care  Equipment Recommended upon Discharge: Wheeled walker  PT Recommended Transfer Status: Assist x1, Assistive device (Min assist)  PT - OK to Discharge: Yes (Per POC)      General Visit Information:   PT  Visit  PT Received On: 04/20/24  Response to Previous Treatment: Patient with no complaints from previous session.  General  Reason for Referral: 74 y/o M s/p C3-C7 Cervical laminectomy; C2-T1 Fusion on 4/19/24  Referred By: NUSRAT Jo  Family/Caregiver Present: No  Prior to Session Communication: Bedside nurse  Patient Position Received: Bed, 3 rail up, Alarm on  Preferred Learning Style: auditory, kinesthetic  General Comment: Patient is alert, pleasant, and agreeable to session.    Subjective   Precautions:  Precautions  LE Weight Bearing Status: Weight Bearing as Tolerated  Medical Precautions: Fall precautions, Spinal precautions  Post-Surgical Precautions: Spinal precautions  Vital Signs:       Objective   Pain:  Pain Assessment  Pain Assessment:  0-10  Pain Score: 5 - Moderate pain  Pain Type: Surgical pain  Pain Location: Neck  Cognition:  Cognition  Overall Cognitive Status: Within Functional Limits  Orientation Level: Oriented X4  Attention: Within Functional Limits  Postural Control:  Static Sitting Balance  Static Sitting-Balance Support: Bilateral upper extremity supported, Feet supported  Static Sitting-Level of Assistance: Distant supervision  Static Sitting-Comment/Number of Minutes: 8  Dynamic Sitting Balance  Dynamic Sitting-Balance Support: No upper extremity supported, Feet supported  Dynamic Sitting-Balance: Reaching for objects  Dynamic Sitting-Comments: Patient is able to perform seated bilateral lower extremity and reaching exercises x 15reps.  Static Standing Balance  Static Standing-Balance Support: Bilateral upper extremity supported  Static Standing-Level of Assistance: Contact guard  Static Standing-Comment/Number of Minutes: With front wheeled walker  Dynamic Standing Balance  Dynamic Standing-Balance Support: Bilateral upper extremity supported  Dynamic Standing-Balance: Forward lean  Extremity/Trunk Assessments:    Activity Tolerance:  Activity Tolerance  Endurance: Tolerates 30+ min exercise without fatigue  Activity Tolerance Comments: Patient tolerates treatment session well.  Treatments:  Therapeutic Exercise  Therapeutic Exercise Performed: Yes  Therapeutic Exercise Activity 1: Patient is instructed in and performs supine and seated bilateral lower exercises x 15 reps x 1.  Working to improved technique for optimal strengthening and endurance.         Bed Mobility 1  Bed Mobility 1: Supine to sitting, Log roll  Level of Assistance 1: Close supervision    Ambulation/Gait Training  Ambulation/Gait Training Performed: Yes  Ambulation/Gait Training 1  Surface 1: Level tile  Device 1: Rolling walker  Gait Support Devices: Gait belt  Assistance 1: Close supervision  Quality of Gait 1: Diminished heel strike,  Antalgic  Comments/Distance (ft) 1: Patient ambulates 200 feet x 2 with walker and at least stand-by assistance.  Trunk flexion noted with cues provided for corrections.  Cues required to slow pace and lift his head.  Transfers  Transfer: Yes  Transfer 1  Transfer From 1: Bed to  Transfer to 1: Stand  Technique 1: Sit to stand  Transfer Device 1: Walker, Gait belt  Transfer Level of Assistance 1: Contact guard, Maximum assistance  Trials/Comments 1: Patient requires cues to improve safety awareness.  Transfers 2  Transfer From 2: Stand to  Transfer to 2: Bed  Technique 2: Stand to sit  Transfer Device 2: Walker  Transfer Level of Assistance 2: Contact guard, Minimal verbal cues  Trials/Comments 2: No manual assistance is needed.  Verbal cues provided to improve safety awareness.    Stairs  Stairs: Yes  Stairs  Rails 1: Bilateral  Curb Step 1: No  Device 1: No device  Support Devices 1: Gait belt  Assistance 1: Close supervision  Comment/Number of Steps 1: 4x3 steps with cues provided for sequence and safety awareness.    Outcome Measures:  [facility] Basic Mobility  Turning from your back to your side while in a flat bed without using bedrails: A little  Moving from lying on your back to sitting on the side of a flat bed without using bedrails: A little  Moving to and from bed to chair (including a wheelchair): A little  Standing up from a chair using your arms (e.g. wheelchair or bedside chair): None  To walk in hospital room: None  Climbing 3-5 steps with railing: None  Basic Mobility - Total Score: 21    Education Documentation  Handouts, taught by Zandra Bojorquez PTA at 4/20/2024  2:17 PM.  Learner: Patient  Readiness: Acceptance  Method: Explanation, Demonstration, Teach-back  Response: Verbalizes Understanding, Demonstrated Understanding    Precautions, taught by Zandra Bojorquez PTA at 4/20/2024  2:17 PM.  Learner: Patient  Readiness: Acceptance  Method: Explanation, Demonstration, Teach-back  Response: Verbalizes  Understanding, Demonstrated Understanding    Body Mechanics, taught by Zandra Bojorquez PTA at 4/20/2024  2:17 PM.  Learner: Patient  Readiness: Acceptance  Method: Explanation, Demonstration, Teach-back  Response: Verbalizes Understanding, Demonstrated Understanding    Home Exercise Program, taught by Zandra Bojorquez PTA at 4/20/2024  2:17 PM.  Learner: Patient  Readiness: Acceptance  Method: Explanation, Demonstration, Teach-back  Response: Verbalizes Understanding, Demonstrated Understanding    Mobility Training, taught by Zandra Bojorquez PTA at 4/20/2024  2:17 PM.  Learner: Patient  Readiness: Acceptance  Method: Explanation, Demonstration, Teach-back  Response: Verbalizes Understanding, Demonstrated Understanding    Education Comments  No comments found.        OP EDUCATION:  Outpatient Education  Individual(s) Educated: Patient  Education Provided: Body Mechanics, Fall Risk, Home Exercise Program    Encounter Problems       Encounter Problems (Active)       Balance       Goal 1 (Progressing)       Start:  04/19/24    Expected End:  05/03/24       Pt performs all sitting balance with supervision and standing balance with close supervision using LRAD            Mobility       STG - Patient will navigate 4-6 steps with left HR support and CGA (Progressing)       Start:  04/19/24    Expected End:  05/03/24            STG - Patient will ambulate (Progressing)       Start:  04/19/24    Expected End:  05/03/24       150 ft with close supervision using LRAD            PT Problem       PT Goal 1 (Progressing)       Start:  04/19/24    Expected End:  05/03/24       Pt demonstrates IND in performing 2 sets of 15 reps of prescribed BLE HEP            PT Transfers       STG - Patient to transfer to and from sit to supine (Progressing)       Start:  04/19/24    Expected End:  05/03/24       Mod IND using the log roll technique         STG - Patient will transfer sit to and from stand (Progressing)       Start:  04/19/24    Expected  End:  05/03/24       With close supervision using LRAD

## 2024-04-20 NOTE — PROGRESS NOTES
Doing well today, balance feels much better  Denies any new neuro sx    dressing c/d/i  5/5 strength b/l upper ext  SILT C5-T1    FELICIA 20cc overnight, full this morning 75cc or so serosang    Plan:  PT/OT  OOB ad frances  If next shift drain output 50cc or less can remove drain and he can be discharged, otherwise will maintain until tomorrow

## 2024-04-20 NOTE — PROGRESS NOTES
[facility] Hospitalist Progress Note        Chris Monique    :  1950(73 y.o.)    MRN:  [id number]  Date: 24     Assessment and Plan:     HTN  - continue home dose norvasc, losartan-hydrochlorothiazide (was told to stop lisinopril-hydrochlorothiazide due to side effect of cough)  - does not measure home BP. BP at PAT visit was 142/73  - hold on further titration of BP meds in inpatient setting as acute pain/headache; should get home BP cuff and keep home BP log for PCP to review. Take log to PCP follow up visit in 1-2 weeks  - Avoid NSAIDs (including IV toradol) for pain as will increase BP; steroids also could increase BP but surgery only plans short course post op     Headache  - no focal deficits; hx of recurring headaches 2x week prior to surgery. Does not need further imaging at present.  - improved with headache cocktail (IV magnesium, benadryl, compazine)     HLD  Aortic atherosclerosis  - not on any lipid lowering meds     BPH  - continue flomax    Constipation  - would dc home with bowel regimen      Disposition: stable for dc from medicine standpoint     The patient/family had opportunity to ask questions. All questions were answered to the best of my ability.    Between 7AM-7PM please message me via Epic Secure Chat.  After 7PM please page Nocturnist on call.    Electronically signed by Candelario Mart DO on 24 at 10:49 AM     Subjective:      Interval History:   Vitals and chart notes from overnight reviewed.   No acute issues overnight.   Patient seen and evaluated at bedside.   HA improved. Some soreness in neck.   Passing gas, no bm post op. No nausea, vomiting. Tolerating po intake.    Review of Systems:   Other than patient's chronic conditions and those complaints in the history above, the rest of the 10 systems review were done and were negative.     Current medications:  Scheduled Meds:acetaminophen, 975 mg, oral, q8h  amLODIPine, 5 mg, oral, Daily  ascorbic acid, 1,000 mg, oral,  Daily  cholecalciferol, 1,000 Units, oral, Daily  cyanocobalamin, 250 mcg, oral, Daily  dexAMETHasone, 10 mg, intravenous, q8h MICHAEL  gabapentin, 100 mg, oral, BID  losartan 50 mg, hydroCHLOROthiazide 12.5 mg for Hyzaar 50/12.5, , oral, Daily  methocarbamol, 1,000 mg, oral, TID  polyethylene glycol, 17 g, oral, Daily  tamsulosin, 0.4 mg, oral, Daily      Continuous Infusions:lactated Ringer's, 100 mL/hr, Last Rate: Stopped (04/19/24 1120)  lactated Ringer's, 100 mL/hr, Last Rate: 100 mL/hr (04/19/24 1826)      PRN Meds:PRN medications: benzocaine-menthol, bisacodyl, bisacodyl, diphenhydrAMINE, diphenhydrAMINE, HYDROmorphone, naloxone, ondansetron ODT **OR** ondansetron, oxyCODONE, oxyCODONE, oxygen, prochlorperazine **OR** prochlorperazine **OR** prochlorperazine      Objective:     Heart Rate:  [68-99]   Temp:  [36 °C (96.8 °F)-37.2 °C (98.9 °F)]   Resp:  [12-18]   BP: (143-193)/()   SpO2:  [92 %-100 %]     Oxygen Dose: *0 L/min    Physical Exam  Vitals and nursing note reviewed.   HENT:      Mouth/Throat:      Mouth: Mucous membranes are moist.      Pharynx: Oropharynx is clear.   Cardiovascular:      Rate and Rhythm: Normal rate and regular rhythm.   Pulmonary:      Effort: Pulmonary effort is normal.   Abdominal:      Palpations: Abdomen is soft.   Neurological:      Mental Status: He is alert.         Labs:   Lab Results   Component Value Date     04/12/2024    K 3.8 04/12/2024     04/12/2024    CO2 31 04/12/2024    BUN 16 04/12/2024    CREATININE 0.79 04/12/2024    GLUCOSE 102 (H) 04/12/2024    CALCIUM 9.2 04/12/2024       Lab Results   Component Value Date    WBC 5.8 04/12/2024    HGB 13.5 04/12/2024    HCT 40.7 (L) 04/12/2024    MCV 89 04/12/2024     04/12/2024

## 2024-04-20 NOTE — CARE PLAN
Problem: Pain  Goal: My pain/discomfort is manageable  Outcome: Progressing     Problem: Safety  Goal: Patient will be injury free during hospitalization  Outcome: Progressing  Goal: I will remain free of falls  Outcome: Progressing     Problem: Daily Care  Goal: Daily care needs are met  Outcome: Progressing     Problem: Psychosocial Needs  Goal: Demonstrates ability to cope with hospitalization/illness  Outcome: Progressing  Goal: Collaborate with me, my family, and caregiver to identify my specific goals  Outcome: Progressing     Problem: Discharge Barriers  Goal: My discharge needs are met  Outcome: Progressing   The patient's goals for the shift include      The clinical goals for the shift include pain mgmt

## 2024-04-21 ENCOUNTER — DOCUMENTATION (OUTPATIENT)
Dept: HOME HEALTH SERVICES | Facility: HOME HEALTH | Age: 74
End: 2024-04-21
Payer: COMMERCIAL

## 2024-04-21 ENCOUNTER — HOME HEALTH ADMISSION (OUTPATIENT)
Dept: HOME HEALTH SERVICES | Facility: HOME HEALTH | Age: 74
End: 2024-04-21
Payer: COMMERCIAL

## 2024-04-21 VITALS
TEMPERATURE: 98.7 F | OXYGEN SATURATION: 97 % | HEART RATE: 91 BPM | RESPIRATION RATE: 19 BRPM | SYSTOLIC BLOOD PRESSURE: 175 MMHG | DIASTOLIC BLOOD PRESSURE: 99 MMHG | BODY MASS INDEX: 30.52 KG/M2 | WEIGHT: 213.19 LBS | HEIGHT: 70 IN

## 2024-04-21 PROBLEM — G95.20 SPINAL CORD COMPRESSION (MULTI): Status: RESOLVED | Noted: 2024-02-07 | Resolved: 2024-04-21

## 2024-04-21 PROCEDURE — 2500000001 HC RX 250 WO HCPCS SELF ADMINISTERED DRUGS (ALT 637 FOR MEDICARE OP): Performed by: ORTHOPAEDIC SURGERY

## 2024-04-21 PROCEDURE — 2500000004 HC RX 250 GENERAL PHARMACY W/ HCPCS (ALT 636 FOR OP/ED): Performed by: ORTHOPAEDIC SURGERY

## 2024-04-21 PROCEDURE — 2500000001 HC RX 250 WO HCPCS SELF ADMINISTERED DRUGS (ALT 637 FOR MEDICARE OP): Performed by: HOSPITALIST

## 2024-04-21 PROCEDURE — 2500000006 HC RX 250 W HCPCS SELF ADMINISTERED DRUGS (ALT 637 FOR ALL PAYERS): Performed by: HOSPITALIST

## 2024-04-21 RX ADMIN — ACETAMINOPHEN 975 MG: 325 TABLET ORAL at 08:58

## 2024-04-21 RX ADMIN — Medication 1000 UNITS: at 09:00

## 2024-04-21 RX ADMIN — ACETAMINOPHEN 975 MG: 325 TABLET ORAL at 00:25

## 2024-04-21 RX ADMIN — TAMSULOSIN HYDROCHLORIDE 0.4 MG: 0.4 CAPSULE ORAL at 08:58

## 2024-04-21 RX ADMIN — METHOCARBAMOL 1000 MG: 500 TABLET ORAL at 08:58

## 2024-04-21 RX ADMIN — POLYETHYLENE GLYCOL 3350 17 G: 17 POWDER, FOR SOLUTION ORAL at 08:58

## 2024-04-21 RX ADMIN — AMLODIPINE BESYLATE 5 MG: 5 TABLET ORAL at 09:01

## 2024-04-21 RX ADMIN — LOSARTAN POTASSIUM: 50 TABLET, FILM COATED ORAL at 09:01

## 2024-04-21 RX ADMIN — OXYCODONE HYDROCHLORIDE AND ACETAMINOPHEN 1000 MG: 500 TABLET ORAL at 09:00

## 2024-04-21 RX ADMIN — GABAPENTIN 200 MG: 100 CAPSULE ORAL at 08:59

## 2024-04-21 RX ADMIN — CYANOCOBALAMIN TAB 500 MCG 250 MCG: 500 TAB at 08:59

## 2024-04-21 ASSESSMENT — COGNITIVE AND FUNCTIONAL STATUS - GENERAL
TURNING FROM BACK TO SIDE WHILE IN FLAT BAD: A LITTLE
MOVING TO AND FROM BED TO CHAIR: A LITTLE
MOBILITY SCORE: 21
DAILY ACTIVITIY SCORE: 24
MOVING FROM LYING ON BACK TO SITTING ON SIDE OF FLAT BED WITH BEDRAILS: A LITTLE

## 2024-04-21 ASSESSMENT — PAIN SCALES - GENERAL
PAINLEVEL_OUTOF10: 6
PAINLEVEL_OUTOF10: 0 - NO PAIN
PAINLEVEL_OUTOF10: 0 - NO PAIN

## 2024-04-21 ASSESSMENT — PAIN - FUNCTIONAL ASSESSMENT
PAIN_FUNCTIONAL_ASSESSMENT: 0-10
PAIN_FUNCTIONAL_ASSESSMENT: 0-10

## 2024-04-21 NOTE — CARE PLAN
Problem: Pain  Goal: My pain/discomfort is manageable  Outcome: Progressing     Problem: Safety  Goal: Patient will be injury free during hospitalization  Outcome: Progressing  Goal: I will remain free of falls  Outcome: Progressing     Problem: Daily Care  Goal: Daily care needs are met  Outcome: Progressing     Problem: Psychosocial Needs  Goal: Demonstrates ability to cope with hospitalization/illness  Outcome: Progressing  Goal: Collaborate with me, my family, and caregiver to identify my specific goals  Outcome: Progressing     Problem: Discharge Barriers  Goal: My discharge needs are met  Outcome: Progressing   The patient's goals for the shift include      The clinical goals for the shift include decrease output from drain and safety

## 2024-04-21 NOTE — HH CARE COORDINATION
Home Care received a Referral for Physical Therapy and Occupational Therapy. We have processed the referral for a Start of Care on 4/22/24.     If you have any questions or concerns, please feel free to contact us at [phone]. Follow the prompts, enter your five digit zip code, and you will be directed to your care team on [facility].

## 2024-04-21 NOTE — CARE PLAN
The patient's goals for the shift include      The clinical goals for the shift include decrease output from drain and safety    Problem: Pain  Goal: My pain/discomfort is manageable  Outcome: Progressing     Problem: Safety  Goal: Patient will be injury free during hospitalization  Outcome: Progressing  Goal: I will remain free of falls  Outcome: Progressing     Problem: Daily Care  Goal: Daily care needs are met  Outcome: Progressing     Problem: Psychosocial Needs  Goal: Demonstrates ability to cope with hospitalization/illness  Outcome: Progressing  Goal: Collaborate with me, my family, and caregiver to identify my specific goals  Outcome: Progressing     Problem: Discharge Barriers  Goal: My discharge needs are met  Outcome: Progressing      Anesthesia Volume In Cc: 2

## 2024-04-21 NOTE — DISCHARGE SUMMARY
Discharge Diagnosis  Cervical spondylosis with myelopathy    Issues Requiring Follow-Up  Cervical surgery    Test Results Pending At Discharge  Pending Labs       No current pending labs.            Hospital Course   unremarkable    Pertinent Physical Exam At Time of Discharge  Baseline neurologic exam    Home Medications     Medication List      START taking these medications     acetaminophen 500 mg tablet; Commonly known as: Tylenol Extra Strength;   Take 2 tablets (1,000 mg) by mouth every 8 hours for 14 days.   methocarbamol 500 mg tablet; Commonly known as: Robaxin; Take 1-2 tabs   every 8 hours as needed for spasms   oxyCODONE 5 mg immediate release tablet; Commonly known as: Roxicodone;   Take 1 tablet (5 mg) by mouth every 4 hours if needed for severe pain (7 -   10) for up to 7 days.   polyethylene glycol 17 gram/dose powder; Commonly known as: Miralax;   Take 17 g by mouth 2 times a day as needed (constipation).     CONTINUE taking these medications     amLODIPine 5 mg tablet; Commonly known as: Norvasc   ascorbic acid 1,000 mg tablet; Commonly known as: Vitamin C   chlorpheniramine 4 mg tablet; Commonly known as: Chlor-Trimeton   cyanocobalamin 500 mcg tablet; Commonly known as: Vitamin B-12   gabapentin 100 mg capsule; Commonly known as: Neurontin   losartan-hydrochlorothiazide 50-12.5 mg tablet; Commonly known as:   Hyzaar   multivitamin tablet   tamsulosin 0.4 mg 24 hr capsule; Commonly known as: Flomax   Vitamin D3 25 MCG (1000 UT) tablet; Generic drug: cholecalciferol   vitamin E 450 mg (1000 unit) capsule     STOP taking these medications     chlorhexidine 0.12 % solution; Commonly known as: Peridex   ibuprofen 400 mg tablet       Outpatient Follow-Up  Future Appointments   Date Time Provider Department Center   5/15/2024 11:30 AM Lexi Barakat PA-C [id number]       Juan Jose Jo MD

## 2024-04-21 NOTE — PROGRESS NOTES
04/21/24 1259   Discharge Planning   Patient expects to be discharged to: Home with [facility]   Does the patient need discharge transport arranged? No     Met with patient to discuss discharge plan.  She plans on being discharged home with [facility].  SOC confirmed with [facility] for 4/22/24  ADOD: today  DISP: home with .  Sahra Saenz RN

## 2024-04-22 ENCOUNTER — HOME CARE VISIT (OUTPATIENT)
Dept: HOME HEALTH SERVICES | Facility: HOME HEALTH | Age: 74
End: 2024-04-22

## 2024-04-22 ENCOUNTER — HOME CARE VISIT (OUTPATIENT)
Dept: HOME HEALTH SERVICES | Facility: HOME HEALTH | Age: 74
End: 2024-04-22
Payer: COMMERCIAL

## 2024-04-22 PROCEDURE — G0151 HHCP-SERV OF PT,EA 15 MIN: HCPCS

## 2024-04-22 PROCEDURE — 0023 HH SOC

## 2024-04-22 SDOH — ECONOMIC STABILITY: HOUSING INSECURITY

## 2024-04-22 SDOH — HEALTH STABILITY: PHYSICAL HEALTH

## 2024-04-22 SDOH — ECONOMIC STABILITY: HOUSING INSECURITY
HOME SAFETY: VRT SGM CERVICAL [63045]   PR ALLOGRAFT FOR SPINE SURGERY ONLY MORSELIZED [20930]   PR POSTERIOR SEGMENTAL INSTRUMENTATION 7-12 VRT SEG [22843]

## 2024-04-22 ASSESSMENT — ENCOUNTER SYMPTOMS
PAIN LOCATION - PAIN SEVERITY: 9/10
PERSON REPORTING PAIN: PATIENT
SUBJECTIVE PAIN PROGRESSION: GRADUALLY IMPROVING
LOWEST PAIN SEVERITY IN PAST 24 HOURS: 7/10
PAIN LOCATION: NECK
FATIGUES EASILY: 1
PAIN LOCATION - PAIN DURATION: ACHE
PAIN SEVERITY GOAL: 0/10
PAIN LOCATION - PAIN QUALITY: ACHE
HIGHEST PAIN SEVERITY IN PAST 24 HOURS: 9/10
PAIN LOCATION - PAIN FREQUENCY: CONSTANT
PAIN: 1
HYPERTENSION: 1
PAIN LOCATION - EXACERBATING FACTORS: MVMT

## 2024-04-22 ASSESSMENT — ACTIVITIES OF DAILY LIVING (ADL)
AMBULATION ASSISTANCE: SUPERVISION
CURRENT_FUNCTION: SUPERVISION
PHYSICAL TRANSFERS ASSESSED: 1
OASIS_M1830: 05
AMBULATION ASSISTANCE: 1
PHYSICAL_TRANSFERS_DEVICES: ROLLATOR
ENTERING_EXITING_HOME: STAND BY ASSIST

## 2024-04-22 NOTE — HOME HEALTH
"COINSURANCE 30%   DEDUCTIBLE: met   OUT OF POCKET: $2341 remaining\    C3-C7 Cervical Laminectomy; C2-T1 Fusion Operative Note   Date: 2024 OR Location: [facility] OR   Name: Chris Monique, : 1950, Age: 73 y.o., MRN: [id number], Sex: male   Diagnosis   Pre-op Diagnosis  * Cervical spondylosis with myelopathy [M47.12]  * Spinal cord compression (Multi) [G95.20] Post-op Diagnosis  * Cervical spondylosis with myelopathy [M47.12]  * Spinal cord compression (Multi) [G95.20]   Procedures   C3-7 laminectomy and fusion   ID GLASER FACETECTOMY & FORAMOTOMY 1 VRT SGM CERVICAL [26654]   ID ALLOGRAFT FOR SPINE SURGERY ONLY MORSELIZED [19076]   ID POSTERIOR SEGMENTAL INSTRUMENTATION 7-12 VRT SEG [03253]    hurt back in [employer]. stenosis L spine. saw Dr Jo as 3rd opinion.   found out he had stenosis and cord impingement C spine, had to do that first.   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

## 2024-04-23 ENCOUNTER — TELEPHONE (OUTPATIENT)
Dept: ORTHOPEDIC SURGERY | Facility: HOSPITAL | Age: 74
End: 2024-04-23
Payer: COMMERCIAL

## 2024-04-23 NOTE — TELEPHONE ENCOUNTER
Patient called requesting a order for a special bed to be delivered to his home to help with his sleep.

## 2024-04-24 ENCOUNTER — HOSPITAL ENCOUNTER (EMERGENCY)
Facility: HOSPITAL | Age: 74
Discharge: HOME | End: 2024-04-24
Attending: EMERGENCY MEDICINE
Payer: COMMERCIAL

## 2024-04-24 ENCOUNTER — HOME CARE VISIT (OUTPATIENT)
Dept: HOME HEALTH SERVICES | Facility: HOME HEALTH | Age: 74
End: 2024-04-24
Payer: COMMERCIAL

## 2024-04-24 ENCOUNTER — APPOINTMENT (OUTPATIENT)
Dept: RADIOLOGY | Facility: HOSPITAL | Age: 74
End: 2024-04-24
Payer: COMMERCIAL

## 2024-04-24 VITALS
DIASTOLIC BLOOD PRESSURE: 89 MMHG | TEMPERATURE: 98.1 F | SYSTOLIC BLOOD PRESSURE: 146 MMHG | OXYGEN SATURATION: 94 % | RESPIRATION RATE: 9 BRPM | HEART RATE: 73 BPM | HEIGHT: 70 IN | WEIGHT: 215 LBS | BODY MASS INDEX: 30.78 KG/M2

## 2024-04-24 DIAGNOSIS — G89.18 POST-OP PAIN: Primary | ICD-10-CM

## 2024-04-24 DIAGNOSIS — G95.20 SPINAL CORD COMPRESSION (MULTI): Primary | ICD-10-CM

## 2024-04-24 LAB
ANION GAP SERPL CALC-SCNC: 9 MMOL/L (ref 10–20)
BASOPHILS # BLD AUTO: 0.04 X10*3/UL (ref 0–0.1)
BASOPHILS NFR BLD AUTO: 0.4 %
BUN SERPL-MCNC: 13 MG/DL (ref 6–23)
CALCIUM SERPL-MCNC: 7.7 MG/DL (ref 8.6–10.3)
CHLORIDE SERPL-SCNC: 100 MMOL/L (ref 98–107)
CO2 SERPL-SCNC: 27 MMOL/L (ref 21–32)
CREAT SERPL-MCNC: 0.61 MG/DL (ref 0.5–1.3)
CRP SERPL-MCNC: 3.39 MG/DL
EGFRCR SERPLBLD CKD-EPI 2021: >90 ML/MIN/1.73M*2
EOSINOPHIL # BLD AUTO: 0.22 X10*3/UL (ref 0–0.4)
EOSINOPHIL NFR BLD AUTO: 2.3 %
ERYTHROCYTE [DISTWIDTH] IN BLOOD BY AUTOMATED COUNT: 12.8 % (ref 11.5–14.5)
ERYTHROCYTE [SEDIMENTATION RATE] IN BLOOD BY WESTERGREN METHOD: 25 MM/H (ref 0–20)
GLUCOSE SERPL-MCNC: 113 MG/DL (ref 74–99)
HCT VFR BLD AUTO: 38.3 % (ref 41–52)
HGB BLD-MCNC: 13.2 G/DL (ref 13.5–17.5)
IMM GRANULOCYTES # BLD AUTO: 0.05 X10*3/UL (ref 0–0.5)
IMM GRANULOCYTES NFR BLD AUTO: 0.5 % (ref 0–0.9)
LYMPHOCYTES # BLD AUTO: 2.83 X10*3/UL (ref 0.8–3)
LYMPHOCYTES NFR BLD AUTO: 29.1 %
MCH RBC QN AUTO: 30 PG (ref 26–34)
MCHC RBC AUTO-ENTMCNC: 34.5 G/DL (ref 32–36)
MCV RBC AUTO: 87 FL (ref 80–100)
MONOCYTES # BLD AUTO: 0.56 X10*3/UL (ref 0.05–0.8)
MONOCYTES NFR BLD AUTO: 5.8 %
NEUTROPHILS # BLD AUTO: 6.01 X10*3/UL (ref 1.6–5.5)
NEUTROPHILS NFR BLD AUTO: 61.9 %
NRBC BLD-RTO: 0 /100 WBCS (ref 0–0)
PLATELET # BLD AUTO: 353 X10*3/UL (ref 150–450)
POTASSIUM SERPL-SCNC: 3.3 MMOL/L (ref 3.5–5.3)
RBC # BLD AUTO: 4.4 X10*6/UL (ref 4.5–5.9)
SODIUM SERPL-SCNC: 133 MMOL/L (ref 136–145)
WBC # BLD AUTO: 9.7 X10*3/UL (ref 4.4–11.3)

## 2024-04-24 PROCEDURE — 96374 THER/PROPH/DIAG INJ IV PUSH: CPT

## 2024-04-24 PROCEDURE — 86140 C-REACTIVE PROTEIN: CPT | Performed by: EMERGENCY MEDICINE

## 2024-04-24 PROCEDURE — 96376 TX/PRO/DX INJ SAME DRUG ADON: CPT

## 2024-04-24 PROCEDURE — 85652 RBC SED RATE AUTOMATED: CPT | Performed by: EMERGENCY MEDICINE

## 2024-04-24 PROCEDURE — 80048 BASIC METABOLIC PNL TOTAL CA: CPT | Performed by: EMERGENCY MEDICINE

## 2024-04-24 PROCEDURE — 85025 COMPLETE CBC W/AUTO DIFF WBC: CPT | Performed by: EMERGENCY MEDICINE

## 2024-04-24 PROCEDURE — 2500000006 HC RX 250 W HCPCS SELF ADMINISTERED DRUGS (ALT 637 FOR ALL PAYERS): Performed by: EMERGENCY MEDICINE

## 2024-04-24 PROCEDURE — 72125 CT NECK SPINE W/O DYE: CPT | Performed by: RADIOLOGY

## 2024-04-24 PROCEDURE — 36415 COLL VENOUS BLD VENIPUNCTURE: CPT | Performed by: EMERGENCY MEDICINE

## 2024-04-24 PROCEDURE — 96372 THER/PROPH/DIAG INJ SC/IM: CPT | Performed by: EMERGENCY MEDICINE

## 2024-04-24 PROCEDURE — 99284 EMERGENCY DEPT VISIT MOD MDM: CPT | Mod: 25

## 2024-04-24 PROCEDURE — 2500000004 HC RX 250 GENERAL PHARMACY W/ HCPCS (ALT 636 FOR OP/ED): Performed by: EMERGENCY MEDICINE

## 2024-04-24 PROCEDURE — 72125 CT NECK SPINE W/O DYE: CPT

## 2024-04-24 RX ORDER — DIAZEPAM 5 MG/1
5 TABLET ORAL ONCE
Status: COMPLETED | OUTPATIENT
Start: 2024-04-24 | End: 2024-04-24

## 2024-04-24 RX ORDER — HYDROMORPHONE HYDROCHLORIDE 1 MG/ML
1 INJECTION, SOLUTION INTRAMUSCULAR; INTRAVENOUS; SUBCUTANEOUS ONCE
Status: COMPLETED | OUTPATIENT
Start: 2024-04-24 | End: 2024-04-24

## 2024-04-24 RX ORDER — ZIPRASIDONE MESYLATE 20 MG/ML
10 INJECTION, POWDER, LYOPHILIZED, FOR SOLUTION INTRAMUSCULAR ONCE
Status: COMPLETED | OUTPATIENT
Start: 2024-04-24 | End: 2024-04-24

## 2024-04-24 RX ORDER — HYDROMORPHONE HYDROCHLORIDE 1 MG/ML
1 INJECTION, SOLUTION INTRAMUSCULAR; INTRAVENOUS; SUBCUTANEOUS ONCE
Status: DISCONTINUED | OUTPATIENT
Start: 2024-04-24 | End: 2024-04-24 | Stop reason: HOSPADM

## 2024-04-24 RX ORDER — BACLOFEN 10 MG/1
10 TABLET ORAL 3 TIMES DAILY
Qty: 15 TABLET | Refills: 0 | Status: SHIPPED | OUTPATIENT
Start: 2024-04-24 | End: 2024-04-26 | Stop reason: SDUPTHER

## 2024-04-24 RX ADMIN — HYDROMORPHONE HYDROCHLORIDE 1 MG: 1 INJECTION, SOLUTION INTRAMUSCULAR; INTRAVENOUS; SUBCUTANEOUS at 08:58

## 2024-04-24 RX ADMIN — HYDROMORPHONE HYDROCHLORIDE 0.5 MG: 1 INJECTION, SOLUTION INTRAMUSCULAR; INTRAVENOUS; SUBCUTANEOUS at 07:47

## 2024-04-24 RX ADMIN — ZIPRASIDONE MESYLATE 10 MG: 20 INJECTION, POWDER, LYOPHILIZED, FOR SOLUTION INTRAMUSCULAR at 08:58

## 2024-04-24 RX ADMIN — DIAZEPAM 5 MG: 5 TABLET ORAL at 07:47

## 2024-04-24 RX ADMIN — HYDROMORPHONE HYDROCHLORIDE 1 MG: 1 INJECTION, SOLUTION INTRAMUSCULAR; INTRAVENOUS; SUBCUTANEOUS at 10:01

## 2024-04-24 ASSESSMENT — PAIN - FUNCTIONAL ASSESSMENT
PAIN_FUNCTIONAL_ASSESSMENT: 0-10

## 2024-04-24 ASSESSMENT — PAIN SCALES - GENERAL
PAINLEVEL_OUTOF10: 10 - WORST POSSIBLE PAIN
PAINLEVEL_OUTOF10: 8

## 2024-04-24 ASSESSMENT — ENCOUNTER SYMPTOMS
LOSS OF SENSATION IN FEET: 0
MUSCLE WEAKNESS: 1
DEPRESSION: 1
LIMITED RANGE OF MOTION: 1
OCCASIONAL FEELINGS OF UNSTEADINESS: 1

## 2024-04-24 NOTE — ED PROVIDER NOTES
HPI   No chief complaint on file.      Chief complaint: Postoperative pain    History of present illness: Patient is a 73-year-old male presenting to the emergency department with complaints of neck pain.  According to the patient, he underwent a cervical spine fusion approximately 5 days ago.  The patient and family state that he has been taking his opiate analgesics at home with little improvement of his pain.  The patient states that the pain is becoming intolerable and as result the patient presents to the emergency department for further evaluation.  The patient has been wearing his soft collar as directed.  He denies any fever or drainage from the wound.        History provided by:  Patient   used: No                        No data recorded                   Patient History   Past Medical History:   Diagnosis Date    Aortic atherosclerosis (CMS-HCC)     without abdominal aortic aneurysm, vascular US 5/3/23    Cervical spondylosis with myelopathy     HLD (hyperlipidemia)     HTN (hypertension)     Low back pain     Lumbar stenosis with neurogenic claudication     Spinal cord compression (Multi)      Past Surgical History:   Procedure Laterality Date    COLONOSCOPY      HERNIA REPAIR      SHOULDER OPEN ROTATOR CUFF REPAIR Left     TOTAL KNEE ARTHROPLASTY Bilateral      Family History   Problem Relation Name Age of Onset    Dementia Mother      Prostate cancer Father      Hydrocephalus Sister      Dementia Brother      Hydrocephalus Brother      Kidney disease Brother       Social History     Tobacco Use    Smoking status: Former     Current packs/day: 0.00     Average packs/day: 0.3 packs/day for 5.0 years (1.3 ttl pk-yrs)     Types: Cigarettes     Start date:      Quit date:      Years since quittin.3    Smokeless tobacco: Never   Vaping Use    Vaping status: Never Used   Substance Use Topics    Alcohol use: Yes     Alcohol/week: 3.0 standard drinks of alcohol     Types: 3  Glasses of wine per week    Drug use: Never       Physical Exam   ED Triage Vitals   Temp Pulse Resp BP   -- -- -- --      SpO2 Temp src Heart Rate Source Patient Position   -- -- -- --      BP Location FiO2 (%)     -- --       Physical Exam  Vitals and nursing note reviewed.   Constitutional:       General: He is not in acute distress.     Appearance: He is well-developed.   HENT:      Head: Normocephalic and atraumatic.   Eyes:      Conjunctiva/sclera: Conjunctivae normal.   Neck:      Comments: The patient has a well-healing midline cervical incision which appears to be well-healing.  Cardiovascular:      Rate and Rhythm: Normal rate and regular rhythm.      Heart sounds: No murmur heard.  Pulmonary:      Effort: Pulmonary effort is normal. No respiratory distress.      Breath sounds: Normal breath sounds.   Abdominal:      Palpations: Abdomen is soft.      Tenderness: There is no abdominal tenderness.   Musculoskeletal:         General: No swelling.      Cervical back: Neck supple. Pain with movement present.   Skin:     General: Skin is warm and dry.      Capillary Refill: Capillary refill takes less than 2 seconds.   Neurological:      Mental Status: He is alert.   Psychiatric:         Mood and Affect: Mood normal.         ED Course & MDM   Diagnoses as of 04/29/24 2243   Post-op pain       Medical Decision Making  Medical decision making: Patient remained stable throughout his time in the emergency department.  CBC demonstrated no significant abnormalities.  Patient's Chem-7 was essentially within normal limits.  C-reactive protein was slightly elevated at 3.39 while sed rate was 25.  CAT scan of the patient cervical spine demonstrated surgical changes but no other significant acute abnormalities.    During his time in the emergency department, the patient required several doses of Dilaudid for pain control.  Despite this, the patient continued to have pain and as result the patient was given a single dose of  Geodon.    After this therapy, the patient became much more comfortable patient's family was comfortable with patient being discharged home.  The patient was instructed to follow-up with orthopedic surgery as an outpatient and to return for any worsening symptoms or fever.  Patient's family expressed understanding and agreement the patient was then discharged home in otherwise stable condition.    Amount and/or Complexity of Data Reviewed  Labs: ordered. Decision-making details documented in ED Course.  Radiology: ordered. Decision-making details documented in ED Course.        Procedure  Procedures     Lloyd Kay MD  04/29/24 2480

## 2024-04-24 NOTE — ED TRIAGE NOTES
Neck surgery Friday. In ED with debilitating pain. Denies fever, cough, chills. Alert and oriented. Speaking in complete sentences. MD bedside. No other complaints at this time.

## 2024-04-25 ENCOUNTER — HOME CARE VISIT (OUTPATIENT)
Dept: HOME HEALTH SERVICES | Facility: HOME HEALTH | Age: 74
End: 2024-04-25
Payer: COMMERCIAL

## 2024-04-25 PROCEDURE — G0152 HHCP-SERV OF OT,EA 15 MIN: HCPCS

## 2024-04-25 ASSESSMENT — ACTIVITIES OF DAILY LIVING (ADL)
PHYSICAL TRANSFERS ASSESSED: 1
DRESSING_UB_CURRENT_FUNCTION: MODERATE ASSIST
TOILETING: 1
DRESSING_LB_CURRENT_FUNCTION: MODERATE ASSIST
CURRENT_FUNCTION: CONTACT GUARD ASSIST
TOILETING: INDEPENDENT

## 2024-04-25 ASSESSMENT — ENCOUNTER SYMPTOMS
PAIN: 1
LOWEST PAIN SEVERITY IN PAST 24 HOURS: 0/10
PAIN LOCATION: NECK
SUBJECTIVE PAIN PROGRESSION: GRADUALLY IMPROVING
PAIN SEVERITY GOAL: 3/10
PERSON REPORTING PAIN: PATIENT
HIGHEST PAIN SEVERITY IN PAST 24 HOURS: 10/10

## 2024-04-26 ENCOUNTER — HOME CARE VISIT (OUTPATIENT)
Dept: HOME HEALTH SERVICES | Facility: HOME HEALTH | Age: 74
End: 2024-04-26
Payer: COMMERCIAL

## 2024-04-26 VITALS
TEMPERATURE: 98.2 F | HEART RATE: 98 BPM | SYSTOLIC BLOOD PRESSURE: 150 MMHG | OXYGEN SATURATION: 98 % | RESPIRATION RATE: 18 BRPM | DIASTOLIC BLOOD PRESSURE: 90 MMHG

## 2024-04-26 DIAGNOSIS — G89.18 POST-OP PAIN: ICD-10-CM

## 2024-04-26 DIAGNOSIS — M47.12 CERVICAL SPONDYLOSIS WITH MYELOPATHY: ICD-10-CM

## 2024-04-26 PROCEDURE — G0157 HHC PT ASSISTANT EA 15: HCPCS | Mod: CQ

## 2024-04-26 RX ORDER — OXYCODONE HYDROCHLORIDE 5 MG/1
5 TABLET ORAL EVERY 4 HOURS PRN
Qty: 42 TABLET | Refills: 0 | Status: SHIPPED | OUTPATIENT
Start: 2024-04-26 | End: 2024-05-03 | Stop reason: SDUPTHER

## 2024-04-26 RX ORDER — BACLOFEN 10 MG/1
10 TABLET ORAL 3 TIMES DAILY
Qty: 15 TABLET | Refills: 0 | Status: SHIPPED | OUTPATIENT
Start: 2024-04-26 | End: 2024-04-30 | Stop reason: SDUPTHER

## 2024-04-26 SDOH — HEALTH STABILITY: PHYSICAL HEALTH: EXERCISE COMMENTS: THERAPEUTIC EXERCISES X 10      SITTING: LAQ, HIP FLEXION  STANDING: MARCHING, CALF RAISES

## 2024-04-26 SDOH — HEALTH STABILITY: PHYSICAL HEALTH: EXERCISE TYPE: LE HEP

## 2024-04-26 ASSESSMENT — ACTIVITIES OF DAILY LIVING (ADL)
AMBULATION_DISTANCE/DURATION_TOLERATED: 40 FT
AMBULATION ASSISTANCE ON FLAT SURFACES: 1

## 2024-04-26 ASSESSMENT — ENCOUNTER SYMPTOMS
PAIN LOCATION - PAIN SEVERITY: 7/10
PAIN LOCATION - RELIEVING FACTORS: ICE AND PAIN MEDS
LOWEST PAIN SEVERITY IN PAST 24 HOURS: 6/10
MUSCLE WEAKNESS: 1
PAIN LOCATION - PAIN QUALITY: ACHE
PAIN LOCATION: NECK
PAIN LOCATION - PAIN QUALITY: ACHE
PAIN LOCATION - PAIN FREQUENCY: CONSTANT
PAIN SEVERITY GOAL: 3/10
PAIN LOCATION - PAIN SEVERITY: 7/10
HIGHEST PAIN SEVERITY IN PAST 24 HOURS: 8/10
PERSON REPORTING PAIN: PATIENT
PAIN: 1
PAIN LOCATION - RELIEVING FACTORS: ICE AND PAIN MEDS
SUBJECTIVE PAIN PROGRESSION: WAXING AND WANING
PAIN LOCATION - PAIN FREQUENCY: CONSTANT
PAIN LOCATION: BACK

## 2024-04-30 ENCOUNTER — HOME CARE VISIT (OUTPATIENT)
Dept: HOME HEALTH SERVICES | Facility: HOME HEALTH | Age: 74
End: 2024-04-30
Payer: COMMERCIAL

## 2024-04-30 ENCOUNTER — TELEPHONE (OUTPATIENT)
Dept: ORTHOPEDIC SURGERY | Facility: HOSPITAL | Age: 74
End: 2024-04-30
Payer: COMMERCIAL

## 2024-04-30 VITALS — SYSTOLIC BLOOD PRESSURE: 158 MMHG | DIASTOLIC BLOOD PRESSURE: 90 MMHG

## 2024-04-30 DIAGNOSIS — G89.18 POST-OP PAIN: ICD-10-CM

## 2024-04-30 PROCEDURE — G0157 HHC PT ASSISTANT EA 15: HCPCS | Mod: CQ

## 2024-04-30 RX ORDER — BACLOFEN 10 MG/1
10 TABLET ORAL 3 TIMES DAILY
Qty: 90 TABLET | Refills: 2 | Status: SHIPPED | OUTPATIENT
Start: 2024-04-30 | End: 2024-05-24

## 2024-04-30 SDOH — HEALTH STABILITY: PHYSICAL HEALTH
EXERCISE COMMENTS: THERAPEUTIC EXERCISES 2 X 10    SITTING: HAMSTRING CURLS, LAQ, DF, PF, HIP FLEXION.  STANDING: MARCHING, MINI SQUATS, HIP ABD, CALF RAISES

## 2024-04-30 SDOH — HEALTH STABILITY: PHYSICAL HEALTH: EXERCISE TYPE: LE HEP

## 2024-04-30 ASSESSMENT — ENCOUNTER SYMPTOMS
PERSON REPORTING PAIN: PATIENT
PAIN SEVERITY GOAL: 3/10
MUSCLE WEAKNESS: 1
LOWEST PAIN SEVERITY IN PAST 24 HOURS: 5/10
PAIN: 1
PAIN LOCATION: NECK
HIGHEST PAIN SEVERITY IN PAST 24 HOURS: 7/10

## 2024-04-30 ASSESSMENT — ACTIVITIES OF DAILY LIVING (ADL)
AMBULATION ASSISTANCE ON FLAT SURFACES: 1
AMBULATION_DISTANCE/DURATION_TOLERATED: 70 FT

## 2024-04-30 NOTE — TELEPHONE ENCOUNTER
Patient wife  has questions regarding medications. Which ones he should be still taking and when? Can you please reach out to patient?

## 2024-05-01 ENCOUNTER — HOME CARE VISIT (OUTPATIENT)
Dept: HOME HEALTH SERVICES | Facility: HOME HEALTH | Age: 74
End: 2024-05-01
Payer: COMMERCIAL

## 2024-05-01 PROCEDURE — G0152 HHCP-SERV OF OT,EA 15 MIN: HCPCS

## 2024-05-01 ASSESSMENT — ACTIVITIES OF DAILY LIVING (ADL)
DRESSING_UB_CURRENT_FUNCTION: SUPERVISION
BATHING ASSESSED: 1
DRESSING_LB_CURRENT_FUNCTION: SUPERVISION
BATHING_CURRENT_FUNCTION: SUPERVISION

## 2024-05-02 ENCOUNTER — HOME CARE VISIT (OUTPATIENT)
Dept: HOME HEALTH SERVICES | Facility: HOME HEALTH | Age: 74
End: 2024-05-02
Payer: COMMERCIAL

## 2024-05-02 VITALS
OXYGEN SATURATION: 98 % | RESPIRATION RATE: 18 BRPM | DIASTOLIC BLOOD PRESSURE: 80 MMHG | TEMPERATURE: 98.8 F | SYSTOLIC BLOOD PRESSURE: 146 MMHG | HEART RATE: 70 BPM

## 2024-05-02 PROCEDURE — G0157 HHC PT ASSISTANT EA 15: HCPCS | Mod: CQ

## 2024-05-02 SDOH — HEALTH STABILITY: PHYSICAL HEALTH: EXERCISE TYPE: LE HEP

## 2024-05-02 SDOH — HEALTH STABILITY: PHYSICAL HEALTH
EXERCISE COMMENTS: THERAPEUTIC EXERCISES 3 X 10     STANDING: HIP ABD, HIP EXT, CALF RAISES, HAMSTRING CURLS, MARCHING  SITTING: HAMSTRING CURLS, LAQ, HIP ABD, DF, PF, HIP FLEXION. THERABAND RESISTANCE FOR HAMSTRING CURLS AND HIP ABD

## 2024-05-02 ASSESSMENT — ENCOUNTER SYMPTOMS
PAIN SEVERITY GOAL: 0/10
LOWEST PAIN SEVERITY IN PAST 24 HOURS: 3/10
SUBJECTIVE PAIN PROGRESSION: WAXING AND WANING
HIGHEST PAIN SEVERITY IN PAST 24 HOURS: 7/10
PERSON REPORTING PAIN: PATIENT
PAIN LOCATION - PAIN FREQUENCY: CONSTANT
PAIN LOCATION - PAIN QUALITY: ACHE
MUSCLE WEAKNESS: 1
PAIN LOCATION: NECK
PAIN LOCATION - PAIN SEVERITY: 5/10
PAIN LOCATION - RELIEVING FACTORS: RESTING
PAIN: 1

## 2024-05-03 DIAGNOSIS — M47.12 CERVICAL SPONDYLOSIS WITH MYELOPATHY: ICD-10-CM

## 2024-05-03 RX ORDER — OXYCODONE HYDROCHLORIDE 5 MG/1
5 TABLET ORAL EVERY 4 HOURS PRN
Qty: 42 TABLET | Refills: 0 | Status: SHIPPED | OUTPATIENT
Start: 2024-05-03 | End: 2024-05-10 | Stop reason: SDUPTHER

## 2024-05-03 RX ORDER — GABAPENTIN 100 MG/1
200 CAPSULE ORAL 2 TIMES DAILY
Qty: 60 CAPSULE | Refills: 11 | Status: SHIPPED | OUTPATIENT
Start: 2024-05-03

## 2024-05-07 ENCOUNTER — HOME CARE VISIT (OUTPATIENT)
Dept: HOME HEALTH SERVICES | Facility: HOME HEALTH | Age: 74
End: 2024-05-07
Payer: COMMERCIAL

## 2024-05-07 VITALS
HEART RATE: 66 BPM | OXYGEN SATURATION: 98 % | DIASTOLIC BLOOD PRESSURE: 80 MMHG | TEMPERATURE: 98.6 F | SYSTOLIC BLOOD PRESSURE: 130 MMHG | RESPIRATION RATE: 18 BRPM

## 2024-05-07 PROCEDURE — G0157 HHC PT ASSISTANT EA 15: HCPCS | Mod: CQ

## 2024-05-07 SDOH — HEALTH STABILITY: PHYSICAL HEALTH: EXERCISE TYPE: SITTING, STANDING LE HEP, CERVICAL HEP

## 2024-05-07 SDOH — HEALTH STABILITY: PHYSICAL HEALTH
EXERCISE COMMENTS: THERAPEUTIC EXERCISES 2 X 10    SITTING: HAMSTRING CURLS, LAQ BOTH WITH THERABAND RESISTANCE. CERVICAL ROTATION, FLEXION AND EXTENSION PAIN FREE LIMITS   STANDING: HIP ABD WITH THERABAND RESISTANCE, CALF RAISES,MARCHING

## 2024-05-07 ASSESSMENT — ENCOUNTER SYMPTOMS
PAIN LOCATION: NECK
PAIN LOCATION - PAIN SEVERITY: 5/10
PERSON REPORTING PAIN: PATIENT
PAIN LOCATION - RELIEVING FACTORS: PAIN MEDS, ICE
PAIN LOCATION - PAIN QUALITY: ACHE
PAIN: 1
MUSCLE WEAKNESS: 1
LIMITED RANGE OF MOTION: 1
PAIN LOCATION - PAIN FREQUENCY: CONSTANT

## 2024-05-08 ENCOUNTER — HOME CARE VISIT (OUTPATIENT)
Dept: HOME HEALTH SERVICES | Facility: HOME HEALTH | Age: 74
End: 2024-05-08
Payer: COMMERCIAL

## 2024-05-08 PROCEDURE — G0152 HHCP-SERV OF OT,EA 15 MIN: HCPCS

## 2024-05-08 ASSESSMENT — ACTIVITIES OF DAILY LIVING (ADL)
DRESSING_UB_CURRENT_FUNCTION: SUPERVISION
TOILETING: INDEPENDENT
PHYSICAL TRANSFERS ASSESSED: 1
DRESSING_LB_CURRENT_FUNCTION: SUPERVISION
CURRENT_FUNCTION: SUPERVISION
TOILETING: 1

## 2024-05-08 ASSESSMENT — ENCOUNTER SYMPTOMS
PERSON REPORTING PAIN: PATIENT
PAIN LOCATION: NECK
PAIN: 1
LOWEST PAIN SEVERITY IN PAST 24 HOURS: 3/10
SUBJECTIVE PAIN PROGRESSION: GRADUALLY IMPROVING
HIGHEST PAIN SEVERITY IN PAST 24 HOURS: 5/10

## 2024-05-09 ENCOUNTER — HOME CARE VISIT (OUTPATIENT)
Dept: HOME HEALTH SERVICES | Facility: HOME HEALTH | Age: 74
End: 2024-05-09
Payer: COMMERCIAL

## 2024-05-09 VITALS
DIASTOLIC BLOOD PRESSURE: 86 MMHG | RESPIRATION RATE: 18 BRPM | SYSTOLIC BLOOD PRESSURE: 136 MMHG | HEART RATE: 72 BPM | TEMPERATURE: 98.2 F

## 2024-05-09 PROCEDURE — G0157 HHC PT ASSISTANT EA 15: HCPCS | Mod: CQ

## 2024-05-09 SDOH — HEALTH STABILITY: PHYSICAL HEALTH: EXERCISE TYPE: LE, CERVICAL HEP

## 2024-05-09 ASSESSMENT — ENCOUNTER SYMPTOMS
PAIN LOCATION - PAIN QUALITY: ACHE, STIFFNESS
SUBJECTIVE PAIN PROGRESSION: GRADUALLY IMPROVING
LOWEST PAIN SEVERITY IN PAST 24 HOURS: 3/10
PAIN: 1
HIGHEST PAIN SEVERITY IN PAST 24 HOURS: 6/10
PAIN LOCATION - PAIN SEVERITY: 4/10
PAIN LOCATION: NECK
MUSCLE WEAKNESS: 1
PAIN LOCATION - PAIN FREQUENCY: CONSTANT
PAIN SEVERITY GOAL: 2/10
LIMITED RANGE OF MOTION: 1
PAIN LOCATION - RELIEVING FACTORS: PAIN MEDS, ICE
PERSON REPORTING PAIN: PATIENT

## 2024-05-10 DIAGNOSIS — M47.12 CERVICAL SPONDYLOSIS WITH MYELOPATHY: ICD-10-CM

## 2024-05-11 RX ORDER — OXYCODONE HYDROCHLORIDE 5 MG/1
5 TABLET ORAL EVERY 4 HOURS PRN
Qty: 42 TABLET | Refills: 0 | Status: SHIPPED | OUTPATIENT
Start: 2024-05-11 | End: 2024-05-20 | Stop reason: SDUPTHER

## 2024-05-14 ENCOUNTER — HOME CARE VISIT (OUTPATIENT)
Dept: HOME HEALTH SERVICES | Facility: HOME HEALTH | Age: 74
End: 2024-05-14
Payer: COMMERCIAL

## 2024-05-14 VITALS
TEMPERATURE: 97.7 F | HEART RATE: 72 BPM | DIASTOLIC BLOOD PRESSURE: 80 MMHG | SYSTOLIC BLOOD PRESSURE: 142 MMHG | RESPIRATION RATE: 18 BRPM

## 2024-05-14 DIAGNOSIS — M47.12 CERVICAL SPONDYLOSIS WITH MYELOPATHY: ICD-10-CM

## 2024-05-14 PROCEDURE — G0157 HHC PT ASSISTANT EA 15: HCPCS | Mod: CQ

## 2024-05-14 SDOH — HEALTH STABILITY: PHYSICAL HEALTH
EXERCISE COMMENTS: THERAPEUTIC EXERCISES 2 X 10     SITTING: HAMSTRING CURLS, LAQ BOTH WITH THERABAND RESISTANCE. CERVICAL ROTATION, FLEXION AND EXTENSION PAIN FREE LIMITS 2 X 10  STANDING: HIP ABD WITH THERABAND RESISTANCE, CALF RAISES,MARCHING 2 X 10

## 2024-05-14 SDOH — HEALTH STABILITY: PHYSICAL HEALTH: EXERCISE TYPE: CERVICAL AND LE HEP.

## 2024-05-14 ASSESSMENT — ENCOUNTER SYMPTOMS
SUBJECTIVE PAIN PROGRESSION: GRADUALLY IMPROVING
PAIN SEVERITY GOAL: 0/10
LOWEST PAIN SEVERITY IN PAST 24 HOURS: 3/10
PAIN: 1
LIMITED RANGE OF MOTION: 1
PAIN LOCATION - PAIN SEVERITY: 4/10
PAIN LOCATION - RELIEVING FACTORS: ICE, PAIN MEDS
PAIN LOCATION - PAIN QUALITY: DULL ACHE
PERSON REPORTING PAIN: PATIENT
HIGHEST PAIN SEVERITY IN PAST 24 HOURS: 5/10
MUSCLE WEAKNESS: 1
PAIN LOCATION: NECK
PAIN LOCATION - PAIN FREQUENCY: CONSTANT

## 2024-05-15 ENCOUNTER — HOSPITAL ENCOUNTER (OUTPATIENT)
Dept: RADIOLOGY | Facility: CLINIC | Age: 74
Discharge: HOME | End: 2024-05-15
Payer: COMMERCIAL

## 2024-05-15 ENCOUNTER — OFFICE VISIT (OUTPATIENT)
Dept: ORTHOPEDIC SURGERY | Facility: CLINIC | Age: 74
End: 2024-05-15
Payer: COMMERCIAL

## 2024-05-15 VITALS — BODY MASS INDEX: 30.1 KG/M2 | HEIGHT: 71 IN | WEIGHT: 215 LBS

## 2024-05-15 DIAGNOSIS — G95.20 SPINAL CORD COMPRESSION (MULTI): Primary | ICD-10-CM

## 2024-05-15 DIAGNOSIS — M47.12 CERVICAL SPONDYLOSIS WITH MYELOPATHY: ICD-10-CM

## 2024-05-15 PROCEDURE — 1111F DSCHRG MED/CURRENT MED MERGE: CPT | Performed by: PHYSICIAN ASSISTANT

## 2024-05-15 PROCEDURE — 72040 X-RAY EXAM NECK SPINE 2-3 VW: CPT | Performed by: RADIOLOGY

## 2024-05-15 PROCEDURE — 72040 X-RAY EXAM NECK SPINE 2-3 VW: CPT

## 2024-05-15 PROCEDURE — 1160F RVW MEDS BY RX/DR IN RCRD: CPT | Performed by: PHYSICIAN ASSISTANT

## 2024-05-15 PROCEDURE — 99024 POSTOP FOLLOW-UP VISIT: CPT | Performed by: PHYSICIAN ASSISTANT

## 2024-05-15 PROCEDURE — 1159F MED LIST DOCD IN RCRD: CPT | Performed by: PHYSICIAN ASSISTANT

## 2024-05-15 PROCEDURE — 1036F TOBACCO NON-USER: CPT | Performed by: PHYSICIAN ASSISTANT

## 2024-05-15 ASSESSMENT — PAIN - FUNCTIONAL ASSESSMENT: PAIN_FUNCTIONAL_ASSESSMENT: NO/DENIES PAIN

## 2024-05-15 NOTE — LETTER
May 15, 2024     Patient: Chris Monique   YOB: 1950   Date of Visit: 5/15/2024       To Whom It May Concern:    It is my medical opinion that Chris Monique may return to work on 5/28/2024 without restrictions .    If you have any questions or concerns, please don't hesitate to call.         Sincerely,        Lexi Barakat PA-C    CC: No Recipients   Calm

## 2024-05-15 NOTE — PROGRESS NOTES
Chris is almost 4 weeks postop from a C3-7 laminectomy with C2-T1 fusion performed by Dr. Jo on 4/19.    He is gradually recovering from surgery.  He has physical therapy coming to the house.  He has some mild residual neck pain.  He has discontinued use of his soft collar, he is working on increasing his neck range of motion.  He ambulates with a cane.  He wants to return to work the day after [employer].  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    On exam, slight unsteady gait without assistive devices, much more balance with his cane.  Full strength of the upper extremities bilaterally, mild weakness of the left deltoid, this is chronic from a previous rotator cuff tear.  Incision healing well, no evidence of infection.    I personally reviewed x-rays of the cervical spine completed today.  There is no evidence of acute fracture or hardware failure.  Stable alignment of cervical fusion.    He can continue to increase his activities as tolerated, no lifting greater than 25 pounds for another 3 weeks.  He should gradually increase his weight restriction 5 pounds per week.  He was given a referral to start some outpatient physical therapy, his last home PT session is tomorrow.  He does not need refills of any medications today.  He was given a work note to return on 5/28.  He will follow-up with me in 3 months with repeat x-rays, cervical AP and lateral only.    **This note was dictated using speech recognition software and was not corrected for spelling or grammatical errors**

## 2024-05-16 ENCOUNTER — HOME CARE VISIT (OUTPATIENT)
Dept: HOME HEALTH SERVICES | Facility: HOME HEALTH | Age: 74
End: 2024-05-16
Payer: COMMERCIAL

## 2024-05-16 PROCEDURE — G0157 HHC PT ASSISTANT EA 15: HCPCS | Mod: CQ

## 2024-05-16 SDOH — HEALTH STABILITY: PHYSICAL HEALTH
EXERCISE COMMENTS: THERAPEUTIC EXERCISES 2 X 10      SITTING: HAMSTRING CURLS, LAQ BOTH WITH THERABAND RESISTANCE. CERVICAL ROTATION, FLEXION AND EXTENSION PAIN FREE ROM 2 X 10

## 2024-05-16 SDOH — HEALTH STABILITY: PHYSICAL HEALTH: EXERCISE TYPE: LE AND CERVICAL HEP

## 2024-05-16 ASSESSMENT — ENCOUNTER SYMPTOMS
MUSCLE WEAKNESS: 1
LIMITED RANGE OF MOTION: 1

## 2024-05-20 ENCOUNTER — TELEPHONE (OUTPATIENT)
Dept: ORTHOPEDIC SURGERY | Facility: HOSPITAL | Age: 74
End: 2024-05-20
Payer: COMMERCIAL

## 2024-05-20 RX ORDER — OXYCODONE HYDROCHLORIDE 5 MG/1
5 TABLET ORAL EVERY 6 HOURS PRN
Qty: 28 TABLET | Refills: 0 | Status: SHIPPED | OUTPATIENT
Start: 2024-05-20 | End: 2024-05-27

## 2024-05-20 NOTE — TELEPHONE ENCOUNTER
Patient called requesting medication refill.    Patient wants to know when he can start taking motrin again?

## 2024-05-22 ENCOUNTER — HOME CARE VISIT (OUTPATIENT)
Dept: HOME HEALTH SERVICES | Facility: HOME HEALTH | Age: 74
End: 2024-05-22
Payer: COMMERCIAL

## 2024-05-22 VITALS
RESPIRATION RATE: 18 BRPM | DIASTOLIC BLOOD PRESSURE: 91 MMHG | OXYGEN SATURATION: 96 % | HEART RATE: 89 BPM | TEMPERATURE: 97.7 F | SYSTOLIC BLOOD PRESSURE: 162 MMHG

## 2024-05-22 PROCEDURE — 0023 HH SOC

## 2024-05-22 PROCEDURE — G0151 HHCP-SERV OF PT,EA 15 MIN: HCPCS

## 2024-05-22 ASSESSMENT — BALANCE ASSESSMENTS
EYES CLOSED AT MAXIMUM POSITION NUDGED: 1 - STEADY
NUDGED SCORE: 2
IMMEDIATE STANDING BALANCE FIRST 5 SECONDS: 1 - STEADY BUT USES WALKER OR OTHER SUPPORT
SITTING DOWN: 2 - SAFE, SMOOTH MOTION
TURNING 360 DEGREES STEPS: 1 - CONTINUOUS STEPS
ARISES: 1 - ABLE, USES ARMS TO HELP
ARISING SCORE: 1
BALANCE SCORE: 14
ATTEMPTS TO ARISE: 2 - ABLE TO RISE, ONE ATTEMPT
STANDING BALANCE: 2 - NARROW STANCE WITHOUT SUPPORT
NUDGED: 2 - STEADY
SITTING BALANCE: 1 - STEADY, SAFE

## 2024-05-22 ASSESSMENT — GAIT ASSESSMENTS
STEP SYMMETRY: 1 - RIGHT AND LEFT STEP LENGTH APPEAR EQUAL
GAIT SCORE: 10
PATH SCORE: 2
TRUNK: 1 - NO SWAY BUT FLEXION OF KNEES OR BACK OR SPREADS ARMS WHILE WALKING
WALKING STANCE: 0 - HEELS APART
INITIATION OF GAIT IMMEDIATELY AFTER GO: 1 - NO HESITANCY
STEP CONTINUITY: 1 - STEPS APPEAR CONTINUOUS
PATH: 2 - STRAIGHT WITHOUT WALKING AID
TRUNK SCORE: 1
BALANCE AND GAIT SCORE: 24

## 2024-05-22 ASSESSMENT — ACTIVITIES OF DAILY LIVING (ADL)
OASIS_M1830: 00
HOME_HEALTH_OASIS: 00

## 2024-05-22 ASSESSMENT — ENCOUNTER SYMPTOMS
DENIES PAIN: 1
PERSON REPORTING PAIN: PATIENT

## 2024-06-13 ENCOUNTER — EVALUATION (OUTPATIENT)
Dept: PHYSICAL THERAPY | Facility: CLINIC | Age: 74
End: 2024-06-13
Payer: COMMERCIAL

## 2024-06-13 DIAGNOSIS — G95.20 SPINAL CORD COMPRESSION (MULTI): ICD-10-CM

## 2024-06-13 PROCEDURE — 97162 PT EVAL MOD COMPLEX 30 MIN: CPT | Mod: GP | Performed by: PHYSICAL THERAPIST

## 2024-06-13 PROCEDURE — 97110 THERAPEUTIC EXERCISES: CPT | Mod: GP | Performed by: PHYSICAL THERAPIST

## 2024-06-13 ASSESSMENT — ENCOUNTER SYMPTOMS
LOSS OF SENSATION IN FEET: 1
OCCASIONAL FEELINGS OF UNSTEADINESS: 0
DEPRESSION: 0

## 2024-06-13 NOTE — PROGRESS NOTES
Physical Therapy    Physical Therapy Evaluation and Treatment      Patient Name: Chris Monique  MRN: [id number]  Today's Date: 6/13/2024  Visit: 1  Insurance: Reviewed  Physician: Lexi Barakat  PT Evaluation Time Entry  PT Evaluation (Moderate) Time Entry: 30  PT Therapeutic Procedures Time Entry  Therapeutic Exercise Time Entry: 10  Time Calculation  Start Time: 0950  Stop Time: 1030  Time Calculation (min): 40 min    Assessment: Patient seen in PT for Initial Evaluation for neck pain, and LE weakness secondary to spinal cord compression s/p neck fusion.   Patient presents with postural deviation  decreased neck ROM , decreased UE strength, neck tenderness, gait deviation, decreased balance.  Functionally, patient  unable to walk without cane, history of frequent falls.  Patient rates oswestry at 46%%.    PT Assessment  Rehab Prognosis: Good     Plan:  Continue with POC  SciFIt stepper, neck and shoulder ROM, LE/UE strength, balance, gait training, CKC,   OP PT Plan  PT Plan: Skilled PT  PT Frequency: 1 time per week  Duration: 8    Current Problem:   1. Spinal cord compression (Multi)  Referral to Physical Therapy    Follow Up In Physical Therapy          Subjective    General: Patient with a history of back and leg for which patient had cervical fusion on 4/19/24.   No complication.  Better balance and bladder control but still having back and leg pain.   Onset was insidious.  Patient treated with oxycodone and baclofen.  Patient complains of pain in the region of the left PL leg to foot, varies ache > sharp pain, 7/10 at worst last 7 days.  Patient's pain is worse with walking/standing/being up on feet.  Functionally, patient is unable to walk without cane due to balance.   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  Shoulder ROM: 90 degrees left shoulder elevation,   UE strength: 3-/5 shoulder elevaiton and er strenght  Tender to palpation at the bilateral mid trap   Ambulates with stiff legs with gait with decreased heel strike and toe off bilaterally  SLS 1 sec bilaterally     Outcome Measures:  Other Measures  Oswestry Disablity Index (GLORIA): 46%     Treatments:  Patient instructed in HEP including: standing dips, FWD/BWD weight shifts, standing hip ROM 10-20X each.  (10 minutes)      EDUCATION: HEP       Goals:  Active       PT Problem       PT Goal 1       Start:  06/13/24    Expected End:  09/11/24       Improve oswestry by 20%         PT Goal 2       Start:  06/13/24    Expected End:  09/11/24       Normalize gait with increased heel strike and toe off         PT Goal 3       Start:  06/13/24    Expected End:  09/11/24       SLS to 3-5 sec bilaterally         PT Goal 4       Start:  06/13/24    Expected End:  09/11/24       Maximize neck ROM         PT Goal 5       Start:  06/13/24    Expected End:  09/11/24       Able to walk safely in home without use of an assistive device

## 2024-07-01 ENCOUNTER — APPOINTMENT (OUTPATIENT)
Dept: PHYSICAL THERAPY | Facility: CLINIC | Age: 74
End: 2024-07-01
Payer: COMMERCIAL

## 2024-07-01 DIAGNOSIS — G89.18 POST-OP PAIN: ICD-10-CM

## 2024-07-01 RX ORDER — BACLOFEN 10 MG/1
10 TABLET ORAL 3 TIMES DAILY
Qty: 270 TABLET | Refills: 1 | Status: SHIPPED | OUTPATIENT
Start: 2024-07-01

## 2024-07-18 ENCOUNTER — DOCUMENTATION (OUTPATIENT)
Dept: PHYSICAL THERAPY | Facility: CLINIC | Age: 74
End: 2024-07-18
Payer: COMMERCIAL

## 2024-07-18 NOTE — PROGRESS NOTES
Physical Therapy                 Therapy Communication Note    Patient Name: Chris Monique  MRN: [id number]  Today's Date: 7/18/2024     Discipline: Physical Therapy    Missed Visit Reason:  patient no call no showed, therapist called patient and left vm reviewing today's missed appointment, the next appointment date/time and  attendance policy.     Missed Time: No Show    Comment:

## 2024-07-25 ENCOUNTER — APPOINTMENT (OUTPATIENT)
Dept: PHYSICAL THERAPY | Facility: CLINIC | Age: 74
End: 2024-07-25
Payer: COMMERCIAL

## 2024-08-07 DIAGNOSIS — M47.12 CERVICAL SPONDYLOSIS WITH MYELOPATHY: ICD-10-CM

## 2024-08-14 ENCOUNTER — OFFICE VISIT (OUTPATIENT)
Dept: ORTHOPEDIC SURGERY | Facility: CLINIC | Age: 74
End: 2024-08-14
Payer: COMMERCIAL

## 2024-08-14 ENCOUNTER — HOSPITAL ENCOUNTER (OUTPATIENT)
Dept: RADIOLOGY | Facility: CLINIC | Age: 74
Discharge: HOME | End: 2024-08-14
Payer: COMMERCIAL

## 2024-08-14 VITALS — BODY MASS INDEX: 30.1 KG/M2 | HEIGHT: 71 IN | WEIGHT: 215 LBS

## 2024-08-14 DIAGNOSIS — M48.062 NEUROGENIC CLAUDICATION DUE TO LUMBAR SPINAL STENOSIS: Primary | ICD-10-CM

## 2024-08-14 DIAGNOSIS — M47.12 CERVICAL SPONDYLOSIS WITH MYELOPATHY: ICD-10-CM

## 2024-08-14 PROCEDURE — 1160F RVW MEDS BY RX/DR IN RCRD: CPT | Performed by: PHYSICIAN ASSISTANT

## 2024-08-14 PROCEDURE — 72040 X-RAY EXAM NECK SPINE 2-3 VW: CPT

## 2024-08-14 PROCEDURE — 3008F BODY MASS INDEX DOCD: CPT | Performed by: PHYSICIAN ASSISTANT

## 2024-08-14 PROCEDURE — 99212 OFFICE O/P EST SF 10 MIN: CPT | Performed by: PHYSICIAN ASSISTANT

## 2024-08-14 PROCEDURE — 1159F MED LIST DOCD IN RCRD: CPT | Performed by: PHYSICIAN ASSISTANT

## 2024-08-14 PROCEDURE — 1036F TOBACCO NON-USER: CPT | Performed by: PHYSICIAN ASSISTANT

## 2024-08-14 ASSESSMENT — PAIN SCALES - GENERAL: PAINLEVEL_OUTOF10: 5 - MODERATE PAIN

## 2024-08-14 ASSESSMENT — PAIN - FUNCTIONAL ASSESSMENT: PAIN_FUNCTIONAL_ASSESSMENT: 0-10

## 2024-08-14 ASSESSMENT — PAIN DESCRIPTION - DESCRIPTORS: DESCRIPTORS: SHARP;ACHING

## 2024-08-14 NOTE — PROGRESS NOTES
Chris is almost 4 months postop from a C3-7 laminectomy with C2-T1 fusion performed by Dr. Jo on 4/19.    He has continued to recover well from surgery.  He has seen improvement in his balance.  He has minimal residual neck pain.  No upper extremity radicular symptoms.  He has been working with a  to improve his strength.  He continues to have low back pain with radiculopathy and symptoms of neurogenic claudication.  He notes he also has lumbar stenosis, he would like to wait until January for surgery.    On exam, balanced gait without assistive devices.  Full strength of the upper extremities bilaterally.  Incision well-healed.    I personally reviewed x-rays of the cervical spine completed today.  There is no evidence of acute fracture or hardware failure.  Stable alignment of cervical fusion.    He can continue to increase his activities as tolerated without restrictions.  He was given a disability placard, he is having difficulty walking distances due to the lumbar stenosis.  He is going to continue working on his strength.  I recommended if he would like to have surgery for the lumbar stenosis in January to come back and see us in November for further discussion.    **This note was dictated using speech recognition software and was not corrected for spelling or grammatical errors**

## 2024-10-16 ENCOUNTER — OFFICE VISIT (OUTPATIENT)
Dept: ORTHOPEDIC SURGERY | Facility: CLINIC | Age: 74
End: 2024-10-16
Payer: COMMERCIAL

## 2024-10-16 VITALS — HEIGHT: 70 IN | WEIGHT: 215 LBS | BODY MASS INDEX: 30.78 KG/M2

## 2024-10-16 DIAGNOSIS — M48.062 NEUROGENIC CLAUDICATION DUE TO LUMBAR SPINAL STENOSIS: ICD-10-CM

## 2024-10-16 DIAGNOSIS — M54.16 LUMBAR RADICULOPATHY: Primary | ICD-10-CM

## 2024-10-16 PROCEDURE — 99214 OFFICE O/P EST MOD 30 MIN: CPT | Performed by: ORTHOPAEDIC SURGERY

## 2024-10-16 RX ORDER — CEFAZOLIN SODIUM 2 G/100ML
2 INJECTION, SOLUTION INTRAVENOUS ONCE
OUTPATIENT
Start: 2024-10-16 | End: 2024-10-16

## 2024-10-16 RX ORDER — ACETAMINOPHEN 325 MG/1
975 TABLET ORAL ONCE
OUTPATIENT
Start: 2024-10-16 | End: 2024-10-16

## 2024-10-16 RX ORDER — GABAPENTIN 300 MG/1
600 CAPSULE ORAL ONCE
OUTPATIENT
Start: 2024-10-16 | End: 2024-10-16

## 2024-10-16 ASSESSMENT — PAIN - FUNCTIONAL ASSESSMENT: PAIN_FUNCTIONAL_ASSESSMENT: 0-10

## 2024-10-16 NOTE — PROGRESS NOTES
Patient returns for follow-up.  He is status post C3-7 laminectomy, C2-T1 fusion in April of this year.  He has tandem stenosis and has had ongoing lower back pain and left leg radicular symptoms.  He has notable neurogenic claudication as well.    He was trying to delay lumbar spine surgery until after the first of the year, but his symptoms are worsening.  He cannot stand and walk for any length of time.  He gets pain in the left groin and medial thigh, as well as left radicular pain that radiates down the lateral aspect of his leg towards the foot.  He has substantial back pain which is worse with standing and walking, improved with rest.    On exam he does have significant left ankle dorsiflexion weakness graded 3-/5, EHL 3/5.  He does have some reproducible left groin and thigh pain with internal rotation of the left hip, left hip range of motion is dramatically decreased.    I reviewed the MRI and x-rays of his lumbar spine.  These demonstrate degenerative scoliosis with coronal plane deformity from L3-5.  There is lateral listhesis at L3-4 and L4-5.  Grade 1 spondylolisthesis at L4-5.    MRI demonstrates severe central stenosis at L3-4 and L4-5.    73-year-old male with degenerative scoliosis, spondylolisthesis, and severe lumbar spinal stenosis.  He has radiculopathy and claudication with a partial foot drop.  We discussed continued conservative care versus surgical treatment.  He would like to proceed with surgery sooner rather than later due to his ongoing and worsening symptoms.    I discussed the risks of surgery including bleeding, infection, paralysis, muscle weakness, CSF leak, bowel or bladder dysfunction, incomplete resolution of pain or numbness, DVT/PE, heart attack, stroke, and other unforeseen medical and anesthesia complications.  I also explained that the typical success rates for operation such as this fall in the 80-85% range, and that there is a small chance that there will be no  improvement, or even less commonly, worsening of the preoperative symptoms.  He verbalized understanding of the risks, benefits, and alternatives to surgical treatment.  The plan will be for L3-5 laminectomy and fusion, L4-5 TLIF.  Surgery was scheduled for November 22 at [facility].    Surgical Codes:  11633 L3  06529 L4  01250 L4-5  86466 L3-4  24308 Nuvasive Reline  99703 Nuvasive TLX 20  82931  18265 Propel DBM      *This note was dictated using speech recognition software and was not corrected for spelling or grammatical errors*

## 2024-11-14 ENCOUNTER — APPOINTMENT (OUTPATIENT)
Dept: PREADMISSION TESTING | Facility: HOSPITAL | Age: 74
End: 2024-11-14
Payer: COMMERCIAL

## 2024-11-14 ENCOUNTER — CLINICAL SUPPORT (OUTPATIENT)
Dept: PREADMISSION TESTING | Facility: HOSPITAL | Age: 74
End: 2024-11-14
Payer: COMMERCIAL

## 2024-11-14 DIAGNOSIS — M54.16 LUMBAR RADICULOPATHY: ICD-10-CM

## 2024-11-14 DIAGNOSIS — M48.062 NEUROGENIC CLAUDICATION DUE TO LUMBAR SPINAL STENOSIS: ICD-10-CM

## 2024-11-15 ENCOUNTER — PRE-ADMISSION TESTING (OUTPATIENT)
Dept: PREADMISSION TESTING | Facility: HOSPITAL | Age: 74
End: 2024-11-15
Payer: COMMERCIAL

## 2024-11-15 ENCOUNTER — LAB (OUTPATIENT)
Dept: LAB | Facility: LAB | Age: 74
End: 2024-11-15
Payer: COMMERCIAL

## 2024-11-15 VITALS
SYSTOLIC BLOOD PRESSURE: 170 MMHG | DIASTOLIC BLOOD PRESSURE: 86 MMHG | RESPIRATION RATE: 16 BRPM | WEIGHT: 221.12 LBS | OXYGEN SATURATION: 98 % | HEIGHT: 70 IN | HEART RATE: 90 BPM | BODY MASS INDEX: 31.66 KG/M2 | TEMPERATURE: 98.6 F

## 2024-11-15 DIAGNOSIS — Z01.818 PRE-OP TESTING: Primary | ICD-10-CM

## 2024-11-15 DIAGNOSIS — M54.16 LUMBAR RADICULOPATHY: ICD-10-CM

## 2024-11-15 DIAGNOSIS — M48.062 NEUROGENIC CLAUDICATION DUE TO LUMBAR SPINAL STENOSIS: ICD-10-CM

## 2024-11-15 DIAGNOSIS — Z01.818 PRE-OP TESTING: ICD-10-CM

## 2024-11-15 LAB
ABO GROUP (TYPE) IN BLOOD: NORMAL
ANION GAP SERPL CALC-SCNC: 10 MMOL/L (ref 10–20)
ANTIBODY SCREEN: NORMAL
APTT PPP: 33 SECONDS (ref 27–38)
ATRIAL RATE: 83 BPM
BASOPHILS # BLD AUTO: 0.03 X10*3/UL (ref 0–0.1)
BASOPHILS NFR BLD AUTO: 0.5 %
BUN SERPL-MCNC: 9 MG/DL (ref 6–23)
CALCIUM SERPL-MCNC: 8.9 MG/DL (ref 8.6–10.3)
CHLORIDE SERPL-SCNC: 103 MMOL/L (ref 98–107)
CO2 SERPL-SCNC: 31 MMOL/L (ref 21–32)
CREAT SERPL-MCNC: 0.58 MG/DL (ref 0.5–1.3)
EGFRCR SERPLBLD CKD-EPI 2021: >90 ML/MIN/1.73M*2
EOSINOPHIL # BLD AUTO: 0.24 X10*3/UL (ref 0–0.4)
EOSINOPHIL NFR BLD AUTO: 4.3 %
ERYTHROCYTE [DISTWIDTH] IN BLOOD BY AUTOMATED COUNT: 12.8 % (ref 11.5–14.5)
EST. AVERAGE GLUCOSE BLD GHB EST-MCNC: 117 MG/DL
GLUCOSE SERPL-MCNC: 90 MG/DL (ref 74–99)
HBA1C MFR BLD: 5.7 %
HCT VFR BLD AUTO: 40.7 % (ref 41–52)
HGB BLD-MCNC: 13.7 G/DL (ref 13.5–17.5)
IMM GRANULOCYTES # BLD AUTO: 0.01 X10*3/UL (ref 0–0.5)
IMM GRANULOCYTES NFR BLD AUTO: 0.2 % (ref 0–0.9)
INR PPP: 1.1 (ref 0.9–1.1)
LYMPHOCYTES # BLD AUTO: 2.01 X10*3/UL (ref 0.8–3)
LYMPHOCYTES NFR BLD AUTO: 35.8 %
MCH RBC QN AUTO: 29.7 PG (ref 26–34)
MCHC RBC AUTO-ENTMCNC: 33.7 G/DL (ref 32–36)
MCV RBC AUTO: 88 FL (ref 80–100)
MONOCYTES # BLD AUTO: 0.3 X10*3/UL (ref 0.05–0.8)
MONOCYTES NFR BLD AUTO: 5.3 %
NEUTROPHILS # BLD AUTO: 3.02 X10*3/UL (ref 1.6–5.5)
NEUTROPHILS NFR BLD AUTO: 53.9 %
NRBC BLD-RTO: 0 /100 WBCS (ref 0–0)
P AXIS: 36 DEGREES
P OFFSET: 187 MS
P ONSET: 148 MS
PLATELET # BLD AUTO: 330 X10*3/UL (ref 150–450)
POTASSIUM SERPL-SCNC: 3.6 MMOL/L (ref 3.5–5.3)
PR INTERVAL: 148 MS
PROTHROMBIN TIME: 12 SECONDS (ref 9.8–12.8)
Q ONSET: 222 MS
QRS COUNT: 13 BEATS
QRS DURATION: 98 MS
QT INTERVAL: 414 MS
QTC CALCULATION(BAZETT): 486 MS
QTC FREDERICIA: 461 MS
R AXIS: -37 DEGREES
RBC # BLD AUTO: 4.61 X10*6/UL (ref 4.5–5.9)
RH FACTOR (ANTIGEN D): NORMAL
SODIUM SERPL-SCNC: 140 MMOL/L (ref 136–145)
T AXIS: 24 DEGREES
T OFFSET: 429 MS
VENTRICULAR RATE: 83 BPM
WBC # BLD AUTO: 5.6 X10*3/UL (ref 4.4–11.3)

## 2024-11-15 PROCEDURE — 83036 HEMOGLOBIN GLYCOSYLATED A1C: CPT

## 2024-11-15 PROCEDURE — 86850 RBC ANTIBODY SCREEN: CPT

## 2024-11-15 PROCEDURE — 93010 ELECTROCARDIOGRAM REPORT: CPT | Performed by: INTERNAL MEDICINE

## 2024-11-15 PROCEDURE — 86900 BLOOD TYPING SEROLOGIC ABO: CPT

## 2024-11-15 PROCEDURE — 86901 BLOOD TYPING SEROLOGIC RH(D): CPT

## 2024-11-15 PROCEDURE — 80048 BASIC METABOLIC PNL TOTAL CA: CPT

## 2024-11-15 PROCEDURE — 36415 COLL VENOUS BLD VENIPUNCTURE: CPT

## 2024-11-15 PROCEDURE — 85730 THROMBOPLASTIN TIME PARTIAL: CPT

## 2024-11-15 PROCEDURE — 85025 COMPLETE CBC W/AUTO DIFF WBC: CPT

## 2024-11-15 PROCEDURE — 85610 PROTHROMBIN TIME: CPT

## 2024-11-15 PROCEDURE — 87081 CULTURE SCREEN ONLY: CPT | Mod: AHULAB | Performed by: NURSE PRACTITIONER

## 2024-11-15 PROCEDURE — 99214 OFFICE O/P EST MOD 30 MIN: CPT | Performed by: NURSE PRACTITIONER

## 2024-11-15 PROCEDURE — 93005 ELECTROCARDIOGRAM TRACING: CPT | Performed by: NURSE PRACTITIONER

## 2024-11-15 RX ORDER — CHLORHEXIDINE GLUCONATE ORAL RINSE 1.2 MG/ML
SOLUTION DENTAL
Qty: 475 ML | Refills: 0 | Status: SHIPPED | OUTPATIENT
Start: 2024-11-15 | End: 2024-11-24 | Stop reason: HOSPADM

## 2024-11-15 ASSESSMENT — ENCOUNTER SYMPTOMS
PALPITATIONS: 0
VOMITING: 0
CONSTITUTIONAL NEGATIVE: 1
CONSTIPATION: 0
DIARRHEA: 0
DYSPNEA AT REST: 0
RESPIRATORY NEGATIVE: 1
NECK STIFFNESS: 1
DYSPNEA WITH EXERTION: 0
NAUSEA: 0
GASTROINTESTINAL NEGATIVE: 1
BRUISES/BLEEDS EASILY: 0
NEUROLOGICAL NEGATIVE: 1
ABDOMINAL PAIN: 0

## 2024-11-15 NOTE — PREPROCEDURE INSTRUCTIONS
Medication List            Accurate as of November 15, 2024  9:29 AM. Always use your most recent med list.                amLODIPine 5 mg tablet  Commonly known as: Norvasc  Medication Adjustments for Surgery: Take on the morning of surgery     ascorbic acid 1,000 mg tablet  Commonly known as: Vitamin C  Additional Medication Adjustments for Surgery: Take last dose 7 days before surgery     baclofen 10 mg tablet  Commonly known as: Lioresal  Take 1 tablet (10 mg) by mouth 3 times a day. As needed for muscle spasm  Notes to patient: May take on the day of surgery if needed     chlorpheniramine 4 mg tablet  Commonly known as: Chlor-Trimeton  Medication Adjustments for Surgery: Take last dose 1 day (24 hours) before surgery     cyanocobalamin 500 mcg tablet  Commonly known as: Vitamin B-12  Medication Adjustments for Surgery: Do Not take on the morning of surgery     docusate sodium 100 mg capsule  Commonly known as: Colace  Medication Adjustments for Surgery: Do Not take on the morning of surgery     DUAL ACTION PAIN RELIEVER ORAL  Additional Medication Adjustments for Surgery: Take last dose 7 days before surgery     gabapentin 100 mg capsule  Commonly known as: Neurontin  Take 2 capsules (200 mg) by mouth 2 times a day.  Medication Adjustments for Surgery: Take on the morning of surgery     losartan-hydrochlorothiazide 50-12.5 mg tablet  Commonly known as: Hyzaar  Notes to patient: Hold any evening dose the night before the day of surgery. Hold the day of surgery.      multivitamin tablet  Additional Medication Adjustments for Surgery: Take last dose 7 days before surgery     tamsulosin 0.4 mg 24 hr capsule  Commonly known as: Flomax  Medication Adjustments for Surgery: Take on the morning of surgery     Vitamin D3 25 MCG (1000 UT) tablet  Generic drug: cholecalciferol  Medication Adjustments for Surgery: Do Not take on the morning of surgery     vitamin E 450 mg (1000 unit) capsule  Additional Medication  Adjustments for Surgery: Take last dose 7 days before surgery                          **Concerning above medication instructions, if medication is normally taken at night, continue normal schedule.**  **DO NOT TAKE NIGHT PRIOR AND MORNING OF SURGERY**    CONTACT SURGEON'S OFFICE IF YOU DEVELOP:  * Fever = 100.4 F   * New respiratory symptoms (e.g. cough, shortness of breath, respiratory distress, sore throat)  * Recent loss of taste or smell  *Flu like symptoms such as headache, fatigue or gastrointestinal symptoms  * You develop any open sores, shingles, burning or painful urination   AND/OR:  * You no longer wish to have the surgery.  * Any other personal circumstances change that may lead to the need to cancel or defer this surgery.  *You were admitted to any hospital within one week of your planned procedure.    SMOKING:  *Quitting smoking can make a huge difference to your health and recovery from surgery.    *If you need help with quitting, call 0-530-QUIT-NOW.    THE DAY OF SURGERY:  *Do not eat any food after midnight the night before surgery.   *You must drink 13.5 ounces of clear liquids (i.e. water, black coffee (no milk or cream), tea, apple juice or electrolyte drinks (gatorade)) 2 hours before your arrival time.  *You may chew gum until 2 hours before your surgery    SURGICAL TIME  *You will be contacted between 2 p.m. and 6 p.m. the business day before your surgery with your arrival time.  *If you haven't received a call by 6pm, call [phone].  *Scheduled surgery times may change and you will be notified if this occurs-check your personal voicemail for any updates.    ON THE MORNING OF SURGERY:  *Wear comfortable, loose fitting clothing.   *Do not use moisturizers, creams, lotions or perfume.  *All jewelry and valuables should be left at home.  *Prosthetic devices such as contact lenses, hearing aids, dentures, eyelash extensions, hairpins and body piercing must be removed before  surgery.    BRING WITH YOU:  *Photo ID and insurance card  *Current list of medicines and allergies  *Pacemaker/Defibrillator/Heart stent cards  *CPAP machine and mask  *Slings/splints/crutches  *Copy of your complete Advanced Directive/DHPOA-if applicable  *Neurostimulator implant remote    PARKING AND ARRIVAL:  *Check in at the Main Entrance desk and let them know you are here for surgery.  *You will be directed to the 2nd floor surgical waiting area.    AFTER OUTPATIENT SURGERY:  *A responsible adult MUST accompany you at the time of discharge and stay with you for 24 hours after your surgery.  *You may NOT drive yourself home after surgery.  *You may use a taxi or ride sharing service ([employer], Uber) to return home ONLY if you are accompanied by a friend or family member.  *Instructions for resuming your medications will be provided by your surgeon.         Patient Information: Oral/Dental Rinse  **This is a prescription; pick it up at your preferred local pharmacy **  What is oral/dental rinse?   It is a mouthwash. It is a way of cleaning the mouth with a germ killing solution before your surgery.  The solution contains chlorhexidine, commonly known as CHG.   It is used inside the mouth to kill a bacteria known as Staphylococcus aureus.  Let your doctor know if you are allergic to Chlorhexidine.    Why do I need to use CHG oral/dental rinse?  The CHG oral/dental rinse helps to kill a bacteria in your mouth known a Staphylococcus aureus.     This reduces the risk of infection at the surgical site.      Using your CHG oral/dental rinse  STEPS:  Use your CHG oral/dental rinse after you brush your teeth the night before (at bedtime) and the morning of your surgery.  Follow all directions on your prescription label.    Use the cap on the container to measure 15ml (fill cap to fill line)  Swish (gargle if you can) the mouthwash in your mouth for at least 30 seconds, (do not to swallow) spit out  After you use your CHG  rinse, do not rinse your mouth with water, drink or eat.  Please refer to prescription label for the appropriate time to resume oral intake  Dental rinse comes in one size bottle: 473ml ~16oz.  You will have leftover    rinse, discard after this use.    What side effects might I have using the CHG oral/dental rinse?  CHG rinse will stick to plaque on the teeth.  Brush and floss just before use.  Teeth brushing will help avoid staining of plaque during use.    Who should I contact if I have questions about the CHG oral/dental rinse?  Please call [facility], Preadmission Testing at [phone] if you have any questions      Home Preoperative Antibacterial Shower     What is a home preoperative antibacterial shower?  This shower is a way of cleaning the skin with a germ killing soap before surgery.  The soap contains chlorhexidine, commonly known as CHG.  CHG is a soap for your skin with germ killing ability.  Let your doctor know if you are allergic to chlorhexidine.    Why do I need to take a preoperative antibacterial shower?  Skin is not sterile.  It is best to try to make your skin as free of germs as possible before surgery.  Proper cleansing with a germ killing soap before surgery can lower the number of germs on your skin.  This helps to reduce the risk of infection at the surgical site.  Following the instructions listed below will help you prepare your skin for surgery.      How do I use the CHG skin cleanser?  Steps:  Begin using your CHG soap five days before your scheduled surgery on ________________________.    Days 1-4 Shower before bed:  Wash your face and genitals with your normal soap and rinse.    Wash and rinse your hair using the CHG soap. Rinse completely, do not condition your   Hair.          3.    Apply the CHG soap to a clean wet washcloth.  Turn the water off or move away                From the water spray to avoid premature rinsing of the CHG soap as you are  applying.     4.   Lather your entire body from the neck down.  Do not use on your face or genitals.   Pay special attention to the area(s) where your incision(s) will be located unless they are on your face.  Avoid scrubbing your skin too hard.  The important point is to have the CHG soap sit on your skin for 3 minutes.    When the 3 minutes are up, turn on the water and rinse the CHG soap off your body completely.   Pat yourself dry with a clean, freshly-laundered towel.  Dress in clean, freshly laundered night clothes.    Be sure to sleep with clean, freshly laundered sheets.  Day 5:  Last shower is the morning before surgery: Follow above Instructions.    NOTE:    *Hair extensions should be removed    *Keep CHG soap out of eyes and ear canals   *DO NOT wash with regular soap on your body after you have used the CHG        soap solution  *DO NOT apply powders, lotions, or perfume.  *Deodorant may be used days 1-4, BUT NOT the day of surgery    Who should I contact if I have any questions regarding the use of CHG soap?  Call the , Preadmission Testing at [phone] if you have any questions.              Patient Information: Pre-Operative Infection Prevention Measures     Why did I have my nose, under my arms and groin swabbed?  The purpose of the swab is to identify Staphylococcus aureus inside your nose or on your skin.  The swab was sent to the laboratory for culture.  A positive swab/culture for Staphylococcus aureus is called colonization or carriage.      What is Staphylococcus aureus?  Staphylococcus aureus, also known as “staph”, is a germ found on the skin or in the nose of healthy people.  Sometimes Staphylococcus aureus can get into the body and cause an infection.  This can be minor (such as pimples, boils or other skin problems).  It might also be serious (such as blood infection, pneumonia or a surgical site infection).    What is Staphylococcus aureus  colonization or carriage?  Colonization or carriage means that a person has the germ but is not sick from it.  These bacteria can be spread on the hands or when breathing or sneezing.    How is Staphylococcus aureus spread?  It is most often spread by close contact with a person or item that carries it.    What happens if my culture is positive for Staphylococcus aureus?  Your doctor/medical team will use this information to guide any antibiotic treatment which may be necessary.  Regardless of the culture results, we will clean the inside of your nose with a betadine swab just before you have your surgery.      Will I get an infection if I have Staphylococcus aureus in my nose or on my skin?  Anyone can get an infection with Staphylococcus aureus.  However, the best way to reduce your risk of infection is to follow the instructions provided to you for the use of your CHG soap and dental rinse.        Who should I contact if I have any questions?  Call the [facility], Preadmission Testing at [phone] if you have any questions.

## 2024-11-15 NOTE — H&P (VIEW-ONLY)
[facility] Evaluation       Name: Chris Monique (Chris Monique)  /Age: 1950/74 y.o.         Date of Consult: 11/15/24    Referring Provider: Dr. Jo    Surgery, Date, and Length:     L3-L5 Lumbar Spine Laminectomy and Fusion, 24, 150 min       Chris Monique is a  74 year-old male who presents to the [facility] for perioperative risk assessment prior to surgery.    Patient presents with a primary diagnosis of Lumbar radiculopathy   Neurogenic claudication due to lumbar spinal stenosis.    He is status post C3-7 laminectomy, C2-T1 fusion in April of this year.  He has tandem stenosis and has had ongoing lower back pain and left leg radicular symptoms.  He has notable neurogenic claudication as well.     He was trying to delay lumbar spine surgery until after the first of the year, but his symptoms are worsening.  He cannot stand and walk for any length of time.  He gets pain in the left groin and medial thigh, as well as left radicular pain that radiates down the lateral aspect of his leg towards the foot.  He has substantial back pain which is worse with standing and walking, improved with rest.     On exam he does have significant left ankle dorsiflexion weakness graded 3-/5, EHL 3/5.  He does have some reproducible left groin and thigh pain with internal rotation of the left hip, left hip range of motion is dramatically decreased.     MRI and x-rays of his lumbar spine.  These demonstrate degenerative scoliosis with coronal plane deformity from L3-5.  There is lateral listhesis at L3-4 and L4-5.  Grade 1 spondylolisthesis at L4-5.     MRI demonstrates severe central stenosis at L3-4 and L4-5.    This note was created in part upon personal review of patient's medical records.      Patient is scheduled to have a L3-L5 Lumbar Spine Laminectomy and Fusion.      Pt denies any past history of anesthetic complications such as PONV, awareness, prolonged sedation, dental damage, aspiration, cardiac arrest,  difficult intubation, difficult I.V. access or unexpected hospital admissions.  NO malignant hyperthermia and or pseudocholinesterase deficiency.  No history of blood transfusions     The patient is not a Church and will accept blood and blood products if medically indicated.   Type and screen sent.     Past Medical History:   Diagnosis Date    Aortic atherosclerosis (CMS-HCC)     without abdominal aortic aneurysm, vascular US 5/3/23    Cervical spondylosis with myelopathy     HLD (hyperlipidemia)     HTN (hypertension)     Low back pain     Lumbar stenosis with neurogenic claudication     Spinal cord compression (Multi)        Past Surgical History:   Procedure Laterality Date    COLONOSCOPY      HERNIA REPAIR      SHOULDER OPEN ROTATOR CUFF REPAIR Left     TOTAL KNEE ARTHROPLASTY Bilateral        Social History     Socioeconomic History    Marital status: Single   Tobacco Use    Smoking status: Former     Current packs/day: 0.00     Average packs/day: 0.3 packs/day for 5.0 years (1.3 ttl pk-yrs)     Types: Cigarettes     Start date:      Quit date:      Years since quittin.9    Smokeless tobacco: Never   Vaping Use    Vaping status: Never Used   Substance and Sexual Activity    Alcohol use: Yes     Alcohol/week: 3.0 standard drinks of alcohol     Types: 3 Glasses of wine per week    Drug use: Never     Social Drivers of Health     Financial Resource Strain: Low Risk  (2024)    Overall Financial Resource Strain (CARDIA)     Difficulty of Paying Living Expenses: Not hard at all   Transportation Needs: No Transportation Needs (2024)    OASIS : Transportation     Lack of Transportation (Medical): No     Lack of Transportation (Non-Medical): No     Patient Unable or Declines to Respond: No   Social Connections: Feeling Socially Integrated (2024)    OASIS : Social Isolation     Frequency of experiencing loneliness or isolation: Never   Housing Stability: Low Risk   (4/19/2024)    Housing Stability Vital Sign     Unable to Pay for Housing in the Last Year: No     Number of Places Lived in the Last Year: 1     Unstable Housing in the Last Year: No         Family History   Problem Relation Name Age of Onset    Dementia Mother      Prostate cancer Father      Hydrocephalus Sister      Dementia Brother      Hydrocephalus Brother      Kidney disease Brother         Allergies   Allergen Reactions    Banana GI Upset    Bee Pollen Itching     Water itchy eyes    Watermelon GI Upset         Current Outpatient Medications:     amLODIPine (Norvasc) 5 mg tablet, Take 2 tablets (10 mg) by mouth once daily., Disp: , Rfl:     ascorbic acid (Vitamin C) 1,000 mg tablet, Take 1 tablet (1,000 mg) by mouth once daily., Disp: , Rfl:     baclofen (Lioresal) 10 mg tablet, Take 1 tablet (10 mg) by mouth 3 times a day. As needed for muscle spasm, Disp: 270 tablet, Rfl: 1    chlorpheniramine (Chlor-Trimeton) 4 mg tablet, Take 1 tablet (4 mg) by mouth every 6 hours if needed for allergies., Disp: , Rfl:     cholecalciferol (Vitamin D3) 25 MCG (1000 UT) tablet, Take 1 tablet (25 mcg) by mouth once daily., Disp: , Rfl:     cyanocobalamin (Vitamin B-12) 500 mcg tablet, Take 1 tablet (500 mcg) by mouth once daily., Disp: , Rfl:     gabapentin (Neurontin) 100 mg capsule, Take 2 capsules (200 mg) by mouth 2 times a day. (Patient taking differently: Take 2 capsules (200 mg) by mouth once daily at bedtime.), Disp: 60 capsule, Rfl: 11    ibuprofen/acetaminophen (DUAL ACTION PAIN RELIEVER ORAL), Take by mouth if needed., Disp: , Rfl:     losartan-hydrochlorothiazide (Hyzaar) 50-12.5 mg tablet, Take 1 tablet by mouth once daily., Disp: , Rfl:     multivitamin tablet, Take 1 tablet by mouth once daily., Disp: , Rfl:     tamsulosin (Flomax) 0.4 mg 24 hr capsule, Take 1 capsule (0.4 mg) by mouth once daily., Disp: , Rfl:     vitamin E 450 mg (1000 unit) capsule, Take by mouth once daily., Disp: , Rfl:      "chlorhexidine (Peridex) 0.12 % solution, Swish for 30 seconds and spit 15mL of solution the night before and morning of surgery, Disp: 475 mL, Rfl: 0    docusate sodium (Colace) 100 mg capsule, Take 1 capsule (100 mg) by mouth 2 times a day., Disp: , Rfl:       PAT ROS:   Constitutional:   neg    Neuro/Psych:   neg    Eyes:    use of corrective lenses  Ears:    tinnitus   no hearing aides  Nose:   Mouth:    Partials on upper and lower  Throat:   neg    Neck:    neck stiffness (previous neck surgery)  Cardio:    Functional 4 Mets. Patient denies SOB walking up 2 flights of stairs /[employer]/30 min cardio/some weights. Uses cane to steady himself   no chest pain   no palpitations   no dyspnea   no THOMSON  Respiratory:   neg    Endocrine:   GI:   neg     no abdominal pain   no constipation   no diarrhea   no nausea   no vomiting  :   neg    Musculoskeletal:    Lower back pain/leg pain    Cannot lay flat/ has to sleep in a recliner  Hematologic:    does not bruise/bleed easily   no history of blood transfusion   no blood clots  Skin:  neg        Physical Exam  Physical exam within normal limits.   Musculoskeletal:      Comments: Limited rom/uses cane          PAT AIRWAY:   Airway:     Mallampati::  IV    Neck ROM::  Limited   Upper and lower partials            Visit Vitals  /86   Pulse 90   Temp 37 °C (98.6 °F) (Temporal)   Resp 16   Ht 1.778 m (5' 10\")   Wt 100 kg (221 lb 1.9 oz)   SpO2 98%   BMI 31.73 kg/m²   Smoking Status Former   BSA 2.22 m²     Patient states he did not have Losartan for a couple of weeks. He states he has taken it this week.      Plan    Cardiovascular:  Patient denies any chest pain, tightness, heaviness, pressure, radiating pain, palpitations, irregular heartbeats, lightheadedness, cough, congestion, shortness of breath, THOMSON, PND, near syncope, weight loss or gain.    HTN  Amlodipine-take on dos  Losartan-hydrochlorothiazide-hold any evening dose the night before the day of " surgery. Hold the day of surgery.     RCRI: 0  Risk of Mace: 3.9%     abdominal aorta 5/3/24  Proximal abdominal aorta: 2.8 cm, 2.6 cm.     Mid abdominal aorta: 2.3 cm, 2.6 cm.     Distal abdominal aorta: 1.9 cm, 1.8 cm.     Right common iliac artery: 1.2 cm, 1.1 cm.     Left common iliac artery: 1.3 cm, 1.0 cm.     IMPRESSION:   Aortic atherosclerotic disease without abdominal aortic aneurysm.       EKG in PAT   Encounter Date: 11/15/24   ECG 12 Lead   Result Value    Ventricular Rate 83    Atrial Rate 83    CT Interval 148    QRS Duration 98    QT Interval 414    QTC Calculation(Bazett) 486    P Axis 36    R Axis -37    T Axis 24    QRS Count 13    Q Onset 222    P Onset 148    P Offset 187    T Offset 429    QTC Fredericia 461    Narrative    Normal sinus rhythm  Left axis deviation  Minimal voltage criteria for LVH, may be normal variant  Prolonged QT  Abnormal ECG  When compared with ECG of 12-APR-2024 07:30,  No significant change was found       Pulm:  Denies any shortness of breath or activity intolerance    Stop Bang= 3 (age, htn, male)    Heme:  Patient instructed to ambulate as soon as possible postoperatively to decrease thromboembolic risk.    Initiate mechanical DVT prophylaxis as soon as possible and initiate chemical prophylaxis when deemed safe from a bleeding standpoint post surgery.    Caprini= 5    Risk assessment complete.  Patient is scheduled for a HIGH surgical risk procedure.          Labs/testing obtained in PAT on 11/15/24  CBC, BMP, Coag, T&S, HgA1c, EKG, MRSA    Lab Results   Component Value Date    WBC 5.6 11/15/2024    HGB 13.7 11/15/2024    HCT 40.7 (L) 11/15/2024    MCV 88 11/15/2024     11/15/2024     Lab Results   Component Value Date    GLUCOSE 90 11/15/2024    CALCIUM 8.9 11/15/2024     11/15/2024    K 3.6 11/15/2024    CO2 31 11/15/2024     11/15/2024    BUN 9 11/15/2024    CREATININE 0.58 11/15/2024     Lab Results   Component Value Date    INR 1.1  11/15/2024    INR 1.0 04/12/2024    PROTIME 12.0 11/15/2024    PROTIME 11.2 04/12/2024     Lab Results   Component Value Date    HGBA1C 5.7 (H) 11/15/2024     Labs reviewed, unremarkable.      Follow up/communication: MRSA pending      Preoperative medication instructions were provided and reviewed with the patient.  Any additional testing or evaluation was explained to the patient.  Nothing by mouth instructions were discussed and patient's questions were answered prior to conclusion to this encounter.  Patient verbalized understanding of preoperative instructions given in preadmission testing; discharge instructions available in EMR.    This note was dictated with speech recognition.  Minor errors may have been detected during use of speech recognition.

## 2024-11-15 NOTE — CPM/PAT H&P
[facility] Evaluation       Name: Chris Monique (Chris Monique)  /Age: 1950/74 y.o.         Date of Consult: 11/15/24    Referring Provider: Dr. Jo    Surgery, Date, and Length:     L3-L5 Lumbar Spine Laminectomy and Fusion, 24, 150 min       Chris Monique is a  74 year-old male who presents to the [facility] for perioperative risk assessment prior to surgery.    Patient presents with a primary diagnosis of Lumbar radiculopathy   Neurogenic claudication due to lumbar spinal stenosis.    He is status post C3-7 laminectomy, C2-T1 fusion in April of this year.  He has tandem stenosis and has had ongoing lower back pain and left leg radicular symptoms.  He has notable neurogenic claudication as well.     He was trying to delay lumbar spine surgery until after the first of the year, but his symptoms are worsening.  He cannot stand and walk for any length of time.  He gets pain in the left groin and medial thigh, as well as left radicular pain that radiates down the lateral aspect of his leg towards the foot.  He has substantial back pain which is worse with standing and walking, improved with rest.     On exam he does have significant left ankle dorsiflexion weakness graded 3-/5, EHL 3/5.  He does have some reproducible left groin and thigh pain with internal rotation of the left hip, left hip range of motion is dramatically decreased.     MRI and x-rays of his lumbar spine.  These demonstrate degenerative scoliosis with coronal plane deformity from L3-5.  There is lateral listhesis at L3-4 and L4-5.  Grade 1 spondylolisthesis at L4-5.     MRI demonstrates severe central stenosis at L3-4 and L4-5.    This note was created in part upon personal review of patient's medical records.      Patient is scheduled to have a L3-L5 Lumbar Spine Laminectomy and Fusion.      Pt denies any past history of anesthetic complications such as PONV, awareness, prolonged sedation, dental damage, aspiration, cardiac arrest,  difficult intubation, difficult I.V. access or unexpected hospital admissions.  NO malignant hyperthermia and or pseudocholinesterase deficiency.  No history of blood transfusions     The patient is not a Yazdanism and will accept blood and blood products if medically indicated.   Type and screen sent.     Past Medical History:   Diagnosis Date    Aortic atherosclerosis (CMS-HCC)     without abdominal aortic aneurysm, vascular US 5/3/23    Cervical spondylosis with myelopathy     HLD (hyperlipidemia)     HTN (hypertension)     Low back pain     Lumbar stenosis with neurogenic claudication     Spinal cord compression (Multi)        Past Surgical History:   Procedure Laterality Date    COLONOSCOPY      HERNIA REPAIR      SHOULDER OPEN ROTATOR CUFF REPAIR Left     TOTAL KNEE ARTHROPLASTY Bilateral        Social History     Socioeconomic History    Marital status: Single   Tobacco Use    Smoking status: Former     Current packs/day: 0.00     Average packs/day: 0.3 packs/day for 5.0 years (1.3 ttl pk-yrs)     Types: Cigarettes     Start date:      Quit date:      Years since quittin.9    Smokeless tobacco: Never   Vaping Use    Vaping status: Never Used   Substance and Sexual Activity    Alcohol use: Yes     Alcohol/week: 3.0 standard drinks of alcohol     Types: 3 Glasses of wine per week    Drug use: Never     Social Drivers of Health     Financial Resource Strain: Low Risk  (2024)    Overall Financial Resource Strain (CARDIA)     Difficulty of Paying Living Expenses: Not hard at all   Transportation Needs: No Transportation Needs (2024)    OASIS : Transportation     Lack of Transportation (Medical): No     Lack of Transportation (Non-Medical): No     Patient Unable or Declines to Respond: No   Social Connections: Feeling Socially Integrated (2024)    OASIS : Social Isolation     Frequency of experiencing loneliness or isolation: Never   Housing Stability: Low Risk   (4/19/2024)    Housing Stability Vital Sign     Unable to Pay for Housing in the Last Year: No     Number of Places Lived in the Last Year: 1     Unstable Housing in the Last Year: No         Family History   Problem Relation Name Age of Onset    Dementia Mother      Prostate cancer Father      Hydrocephalus Sister      Dementia Brother      Hydrocephalus Brother      Kidney disease Brother         Allergies   Allergen Reactions    Banana GI Upset    Bee Pollen Itching     Water itchy eyes    Watermelon GI Upset         Current Outpatient Medications:     amLODIPine (Norvasc) 5 mg tablet, Take 2 tablets (10 mg) by mouth once daily., Disp: , Rfl:     ascorbic acid (Vitamin C) 1,000 mg tablet, Take 1 tablet (1,000 mg) by mouth once daily., Disp: , Rfl:     baclofen (Lioresal) 10 mg tablet, Take 1 tablet (10 mg) by mouth 3 times a day. As needed for muscle spasm, Disp: 270 tablet, Rfl: 1    chlorpheniramine (Chlor-Trimeton) 4 mg tablet, Take 1 tablet (4 mg) by mouth every 6 hours if needed for allergies., Disp: , Rfl:     cholecalciferol (Vitamin D3) 25 MCG (1000 UT) tablet, Take 1 tablet (25 mcg) by mouth once daily., Disp: , Rfl:     cyanocobalamin (Vitamin B-12) 500 mcg tablet, Take 1 tablet (500 mcg) by mouth once daily., Disp: , Rfl:     gabapentin (Neurontin) 100 mg capsule, Take 2 capsules (200 mg) by mouth 2 times a day. (Patient taking differently: Take 2 capsules (200 mg) by mouth once daily at bedtime.), Disp: 60 capsule, Rfl: 11    ibuprofen/acetaminophen (DUAL ACTION PAIN RELIEVER ORAL), Take by mouth if needed., Disp: , Rfl:     losartan-hydrochlorothiazide (Hyzaar) 50-12.5 mg tablet, Take 1 tablet by mouth once daily., Disp: , Rfl:     multivitamin tablet, Take 1 tablet by mouth once daily., Disp: , Rfl:     tamsulosin (Flomax) 0.4 mg 24 hr capsule, Take 1 capsule (0.4 mg) by mouth once daily., Disp: , Rfl:     vitamin E 450 mg (1000 unit) capsule, Take by mouth once daily., Disp: , Rfl:      "chlorhexidine (Peridex) 0.12 % solution, Swish for 30 seconds and spit 15mL of solution the night before and morning of surgery, Disp: 475 mL, Rfl: 0    docusate sodium (Colace) 100 mg capsule, Take 1 capsule (100 mg) by mouth 2 times a day., Disp: , Rfl:       PAT ROS:   Constitutional:   neg    Neuro/Psych:   neg    Eyes:    use of corrective lenses  Ears:    tinnitus   no hearing aides  Nose:   Mouth:    Partials on upper and lower  Throat:   neg    Neck:    neck stiffness (previous neck surgery)  Cardio:    Functional 4 Mets. Patient denies SOB walking up 2 flights of stairs /[employer]/30 min cardio/some weights. Uses cane to steady himself   no chest pain   no palpitations   no dyspnea   no THOMSON  Respiratory:   neg    Endocrine:   GI:   neg     no abdominal pain   no constipation   no diarrhea   no nausea   no vomiting  :   neg    Musculoskeletal:    Lower back pain/leg pain    Cannot lay flat/ has to sleep in a recliner  Hematologic:    does not bruise/bleed easily   no history of blood transfusion   no blood clots  Skin:  neg        Physical Exam  Physical exam within normal limits.   Musculoskeletal:      Comments: Limited rom/uses cane          PAT AIRWAY:   Airway:     Mallampati::  IV    Neck ROM::  Limited   Upper and lower partials            Visit Vitals  /86   Pulse 90   Temp 37 °C (98.6 °F) (Temporal)   Resp 16   Ht 1.778 m (5' 10\")   Wt 100 kg (221 lb 1.9 oz)   SpO2 98%   BMI 31.73 kg/m²   Smoking Status Former   BSA 2.22 m²     Patient states he did not have Losartan for a couple of weeks. He states he has taken it this week.      Plan    Cardiovascular:  Patient denies any chest pain, tightness, heaviness, pressure, radiating pain, palpitations, irregular heartbeats, lightheadedness, cough, congestion, shortness of breath, THOMSON, PND, near syncope, weight loss or gain.    HTN  Amlodipine-take on dos  Losartan-hydrochlorothiazide-hold any evening dose the night before the day of " surgery. Hold the day of surgery.     RCRI: 0  Risk of Mace: 3.9%     abdominal aorta 5/3/24  Proximal abdominal aorta: 2.8 cm, 2.6 cm.     Mid abdominal aorta: 2.3 cm, 2.6 cm.     Distal abdominal aorta: 1.9 cm, 1.8 cm.     Right common iliac artery: 1.2 cm, 1.1 cm.     Left common iliac artery: 1.3 cm, 1.0 cm.     IMPRESSION:   Aortic atherosclerotic disease without abdominal aortic aneurysm.       EKG in PAT   Encounter Date: 11/15/24   ECG 12 Lead   Result Value    Ventricular Rate 83    Atrial Rate 83    ID Interval 148    QRS Duration 98    QT Interval 414    QTC Calculation(Bazett) 486    P Axis 36    R Axis -37    T Axis 24    QRS Count 13    Q Onset 222    P Onset 148    P Offset 187    T Offset 429    QTC Fredericia 461    Narrative    Normal sinus rhythm  Left axis deviation  Minimal voltage criteria for LVH, may be normal variant  Prolonged QT  Abnormal ECG  When compared with ECG of 12-APR-2024 07:30,  No significant change was found       Pulm:  Denies any shortness of breath or activity intolerance    Stop Bang= 3 (age, htn, male)    Heme:  Patient instructed to ambulate as soon as possible postoperatively to decrease thromboembolic risk.    Initiate mechanical DVT prophylaxis as soon as possible and initiate chemical prophylaxis when deemed safe from a bleeding standpoint post surgery.    Caprini= 5    Risk assessment complete.  Patient is scheduled for a HIGH surgical risk procedure.          Labs/testing obtained in PAT on 11/15/24  CBC, BMP, Coag, T&S, HgA1c, EKG, MRSA    Lab Results   Component Value Date    WBC 5.6 11/15/2024    HGB 13.7 11/15/2024    HCT 40.7 (L) 11/15/2024    MCV 88 11/15/2024     11/15/2024     Lab Results   Component Value Date    GLUCOSE 90 11/15/2024    CALCIUM 8.9 11/15/2024     11/15/2024    K 3.6 11/15/2024    CO2 31 11/15/2024     11/15/2024    BUN 9 11/15/2024    CREATININE 0.58 11/15/2024     Lab Results   Component Value Date    INR 1.1  11/15/2024    INR 1.0 04/12/2024    PROTIME 12.0 11/15/2024    PROTIME 11.2 04/12/2024     Lab Results   Component Value Date    HGBA1C 5.7 (H) 11/15/2024     Labs reviewed, unremarkable.      Follow up/communication: MRSA pending      Preoperative medication instructions were provided and reviewed with the patient.  Any additional testing or evaluation was explained to the patient.  Nothing by mouth instructions were discussed and patient's questions were answered prior to conclusion to this encounter.  Patient verbalized understanding of preoperative instructions given in preadmission testing; discharge instructions available in EMR.    This note was dictated with speech recognition.  Minor errors may have been detected during use of speech recognition.

## 2024-11-15 NOTE — CPM/PAT NURSE NOTE
CPM/PAT Nurse Note      Name: Chris Monique (Chris Monique)  /Age: 1950/74 y.o.       Past Medical History:   Diagnosis Date    Aortic atherosclerosis (CMS-HCC)     without abdominal aortic aneurysm, vascular US 5/3/23    Cervical spondylosis with myelopathy     HLD (hyperlipidemia)     HTN (hypertension)     Low back pain     Lumbar stenosis with neurogenic claudication     Spinal cord compression (Multi)        Past Surgical History:   Procedure Laterality Date    COLONOSCOPY      HERNIA REPAIR      SHOULDER OPEN ROTATOR CUFF REPAIR Left     TOTAL KNEE ARTHROPLASTY Bilateral        Patient  has no history on file for sexual activity.    Family History   Problem Relation Name Age of Onset    Dementia Mother      Prostate cancer Father      Hydrocephalus Sister      Dementia Brother      Hydrocephalus Brother      Kidney disease Brother         Allergies   Allergen Reactions    Banana GI Upset    Bee Pollen Itching     Water itchy eyes    Watermelon GI Upset       Prior to Admission medications    Medication Sig Start Date End Date Taking? Authorizing Provider   amLODIPine (Norvasc) 5 mg tablet Take 2 tablets (10 mg) by mouth once daily.   Yes Historical Provider, MD   ascorbic acid (Vitamin C) 1,000 mg tablet Take 1 tablet (1,000 mg) by mouth once daily.   Yes Historical Provider, MD   baclofen (Lioresal) 10 mg tablet Take 1 tablet (10 mg) by mouth 3 times a day. As needed for muscle spasm 24  Yes Lexi Barakat PA-C   chlorpheniramine (Chlor-Trimeton) 4 mg tablet Take 1 tablet (4 mg) by mouth every 6 hours if needed for allergies.   Yes Historical Provider, MD   cholecalciferol (Vitamin D3) 25 MCG (1000 UT) tablet Take 1 tablet (25 mcg) by mouth once daily.   Yes Historical Provider, MD   cyanocobalamin (Vitamin B-12) 500 mcg tablet Take 1 tablet (500 mcg) by mouth once daily.   Yes Historical Provider, MD   gabapentin (Neurontin) 100 mg capsule Take 2 capsules (200 mg) by mouth 2 times a  day.  Patient taking differently: Take 2 capsules (200 mg) by mouth once daily at bedtime. 5/3/24  Yes Lexi Barakat PA-C   ibuprofen/acetaminophen (DUAL ACTION PAIN RELIEVER ORAL) Take by mouth if needed.   Yes Historical Provider, MD   losartan-hydrochlorothiazide (Hyzaar) 50-12.5 mg tablet Take 1 tablet by mouth once daily. 4/11/24  Yes Historical Provider, MD   multivitamin tablet Take 1 tablet by mouth once daily.   Yes Historical Provider, MD   tamsulosin (Flomax) 0.4 mg 24 hr capsule Take 1 capsule (0.4 mg) by mouth once daily.   Yes Historical Provider, MD   vitamin E 450 mg (1000 unit) capsule Take by mouth once daily.   Yes Historical Provider, MD   chlorhexidine (Peridex) 0.12 % solution Swish for 30 seconds and spit 15mL of solution the night before and morning of surgery 11/15/24   JANA Zhu-CNP   docusate sodium (Colace) 100 mg capsule Take 1 capsule (100 mg) by mouth 2 times a day. 4/22/24   Juan Jose Jo MD        PAT ROS     DASI Risk Score    No data to display       Caprini DVT Assessment      Flowsheet Row Admission (Discharged) from 4/19/2024 in [facility] Bldg [facility] 7 with Juan Jose Jo MD   DVT Score 5 filed at 04/19/2024 0922   BMI 30 or less filed at 04/19/2024 0922   RETIRED: Current Status Major surgery planned, including arthroscopic and laproscopic (1-2 hours) filed at 04/19/2024 0922   RETIRED: Age 60-75 years filed at 04/19/2024 0922          Modified Frailty Index    No data to display       CHADS2 Stroke Risk  Current as of 16 minutes ago        N/A 3 to 100%: High Risk   2 to < 3%: Medium Risk   0 to < 2%: Low Risk     Last Change: N/A          This score determines the patient's risk of having a stroke if the patient has atrial fibrillation.        This score is not applicable to this patient. Components are not calculated.          Revised Cardiac Risk Index    No data to display       Apfel Simplified Score    No data to display       Risk Analysis  Index Results This Encounter    No data found in the last 10 encounters.       Prodigy: High Risk  Total Score: 20              Prodigy Age Score      Prodigy Gender Score          ARISCAT Score for Postoperative Pulmonary Complications    No data to display       Pacheco Perioperative Risk for Myocardial Infarction or Cardiac Arrest (IRVING)    No data to display         Nurse Plan of Action:   After Visit Summary (AVS) reviewed and patient verbalized good understanding of medications and NPO instructions.  Pre-op infection prevention measures:  CHG showers and mouthwash reviewed, understanding voiced.  CHG soap given and patient verbalized need to pick CHG mouthwash at their preferred local pharmacy.

## 2024-11-17 LAB — STAPHYLOCOCCUS SPEC CULT: NORMAL

## 2024-11-22 ENCOUNTER — APPOINTMENT (OUTPATIENT)
Dept: RADIOLOGY | Facility: HOSPITAL | Age: 74
End: 2024-11-22
Payer: COMMERCIAL

## 2024-11-22 ENCOUNTER — ANESTHESIA (OUTPATIENT)
Dept: OPERATING ROOM | Facility: HOSPITAL | Age: 74
End: 2024-11-22
Payer: COMMERCIAL

## 2024-11-22 ENCOUNTER — ANESTHESIA EVENT (OUTPATIENT)
Dept: OPERATING ROOM | Facility: HOSPITAL | Age: 74
End: 2024-11-22
Payer: COMMERCIAL

## 2024-11-22 ENCOUNTER — HOSPITAL ENCOUNTER (OUTPATIENT)
Facility: HOSPITAL | Age: 74
LOS: 1 days | Discharge: HOME HEALTH | End: 2024-11-24
Attending: ORTHOPAEDIC SURGERY | Admitting: ORTHOPAEDIC SURGERY
Payer: COMMERCIAL

## 2024-11-22 DIAGNOSIS — M54.16 LUMBAR RADICULOPATHY: Primary | ICD-10-CM

## 2024-11-22 DIAGNOSIS — M48.062 NEUROGENIC CLAUDICATION DUE TO LUMBAR SPINAL STENOSIS: ICD-10-CM

## 2024-11-22 DIAGNOSIS — M48.062 SPINAL STENOSIS OF LUMBAR REGION WITH NEUROGENIC CLAUDICATION: ICD-10-CM

## 2024-11-22 PROBLEM — I10 HTN (HYPERTENSION): Status: ACTIVE | Noted: 2024-11-22

## 2024-11-22 PROBLEM — E66.9 OBESITY: Status: ACTIVE | Noted: 2024-11-22

## 2024-11-22 PROCEDURE — 7100000001 HC RECOVERY ROOM TIME - INITIAL BASE CHARGE: Performed by: ORTHOPAEDIC SURGERY

## 2024-11-22 PROCEDURE — C1765 ADHESION BARRIER: HCPCS | Performed by: ORTHOPAEDIC SURGERY

## 2024-11-22 PROCEDURE — 63048 LAM FACETEC &FORAMOT EA ADDL: CPT | Performed by: ORTHOPAEDIC SURGERY

## 2024-11-22 PROCEDURE — 2500000001 HC RX 250 WO HCPCS SELF ADMINISTERED DRUGS (ALT 637 FOR MEDICARE OP): Performed by: PHYSICIAN ASSISTANT

## 2024-11-22 PROCEDURE — 7100000002 HC RECOVERY ROOM TIME - EACH INCREMENTAL 1 MINUTE: Performed by: ORTHOPAEDIC SURGERY

## 2024-11-22 PROCEDURE — 2500000004 HC RX 250 GENERAL PHARMACY W/ HCPCS (ALT 636 FOR OP/ED): Mod: JZ | Performed by: PHYSICIAN ASSISTANT

## 2024-11-22 PROCEDURE — 3700000001 HC GENERAL ANESTHESIA TIME - INITIAL BASE CHARGE: Performed by: ORTHOPAEDIC SURGERY

## 2024-11-22 PROCEDURE — 2500000005 HC RX 250 GENERAL PHARMACY W/O HCPCS: Performed by: STUDENT IN AN ORGANIZED HEALTH CARE EDUCATION/TRAINING PROGRAM

## 2024-11-22 PROCEDURE — 22614 ARTHRD PST TQ 1NTRSPC EA ADD: CPT | Performed by: ORTHOPAEDIC SURGERY

## 2024-11-22 PROCEDURE — 2500000001 HC RX 250 WO HCPCS SELF ADMINISTERED DRUGS (ALT 637 FOR MEDICARE OP): Performed by: ORTHOPAEDIC SURGERY

## 2024-11-22 PROCEDURE — 2500000002 HC RX 250 W HCPCS SELF ADMINISTERED DRUGS (ALT 637 FOR MEDICARE OP, ALT 636 FOR OP/ED): Performed by: PHYSICIAN ASSISTANT

## 2024-11-22 PROCEDURE — 2500000005 HC RX 250 GENERAL PHARMACY W/O HCPCS: Performed by: ORTHOPAEDIC SURGERY

## 2024-11-22 PROCEDURE — 20930 SP BONE ALGRFT MORSEL ADD-ON: CPT | Performed by: ORTHOPAEDIC SURGERY

## 2024-11-22 PROCEDURE — 2720000007 HC OR 272 NO HCPCS: Performed by: ORTHOPAEDIC SURGERY

## 2024-11-22 PROCEDURE — C1889 IMPLANT/INSERT DEVICE, NOC: HCPCS | Performed by: ORTHOPAEDIC SURGERY

## 2024-11-22 PROCEDURE — C1713 ANCHOR/SCREW BN/BN,TIS/BN: HCPCS | Performed by: ORTHOPAEDIC SURGERY

## 2024-11-22 PROCEDURE — 7100000011 HC EXTENDED STAY RECOVERY HOURLY - NURSING UNIT

## 2024-11-22 PROCEDURE — 20936 SP BONE AGRFT LOCAL ADD-ON: CPT | Performed by: ORTHOPAEDIC SURGERY

## 2024-11-22 PROCEDURE — 3600000017 HC OR TIME - EACH INCREMENTAL 1 MINUTE - PROCEDURE LEVEL SIX: Performed by: ORTHOPAEDIC SURGERY

## 2024-11-22 PROCEDURE — 2500000004 HC RX 250 GENERAL PHARMACY W/ HCPCS (ALT 636 FOR OP/ED): Performed by: STUDENT IN AN ORGANIZED HEALTH CARE EDUCATION/TRAINING PROGRAM

## 2024-11-22 PROCEDURE — 22842 INSERT SPINE FIXATION DEVICE: CPT | Performed by: ORTHOPAEDIC SURGERY

## 2024-11-22 PROCEDURE — 2780000003 HC OR 278 NO HCPCS: Performed by: ORTHOPAEDIC SURGERY

## 2024-11-22 PROCEDURE — 76000 FLUOROSCOPY <1 HR PHYS/QHP: CPT

## 2024-11-22 PROCEDURE — 3700000002 HC GENERAL ANESTHESIA TIME - EACH INCREMENTAL 1 MINUTE: Performed by: ORTHOPAEDIC SURGERY

## 2024-11-22 PROCEDURE — 36415 COLL VENOUS BLD VENIPUNCTURE: CPT | Performed by: ORTHOPAEDIC SURGERY

## 2024-11-22 PROCEDURE — RXMED WILLOW AMBULATORY MEDICATION CHARGE

## 2024-11-22 PROCEDURE — 63047 LAM FACETEC & FORAMOT LUMBAR: CPT | Performed by: ORTHOPAEDIC SURGERY

## 2024-11-22 PROCEDURE — 2500000004 HC RX 250 GENERAL PHARMACY W/ HCPCS (ALT 636 FOR OP/ED): Performed by: ANESTHESIOLOGY

## 2024-11-22 PROCEDURE — 3600000018 HC OR TIME - INITIAL BASE CHARGE - PROCEDURE LEVEL SIX: Performed by: ORTHOPAEDIC SURGERY

## 2024-11-22 PROCEDURE — 2500000005 HC RX 250 GENERAL PHARMACY W/O HCPCS: Performed by: ANESTHESIOLOGY

## 2024-11-22 PROCEDURE — 22612 ARTHRD PST TQ 1NTRSPC LUMBAR: CPT | Performed by: ORTHOPAEDIC SURGERY

## 2024-11-22 PROCEDURE — 2500000001 HC RX 250 WO HCPCS SELF ADMINISTERED DRUGS (ALT 637 FOR MEDICARE OP): Performed by: STUDENT IN AN ORGANIZED HEALTH CARE EDUCATION/TRAINING PROGRAM

## 2024-11-22 DEVICE — IMPLANTABLE DEVICE: Type: IMPLANTABLE DEVICE | Site: SPINE LUMBAR | Status: FUNCTIONAL

## 2024-11-22 DEVICE — SCREW, RELINE-O, 6.5X45MM 2S POLYAXIAL: Type: IMPLANTABLE DEVICE | Site: SPINE LUMBAR | Status: FUNCTIONAL

## 2024-11-22 DEVICE — SCREW, RELINE LOCK, 5.5MM OPEN TULIP: Type: IMPLANTABLE DEVICE | Site: SPINE LUMBAR | Status: FUNCTIONAL

## 2024-11-22 DEVICE — SCREW, RELINE-O, 6.5X50MM 2S POLYAXIAL: Type: IMPLANTABLE DEVICE | Site: SPINE LUMBAR | Status: FUNCTIONAL

## 2024-11-22 DEVICE — ROD, RELINE-0, 5.5 X 100MM, LORDOTIC: Type: IMPLANTABLE DEVICE | Site: SPINE LUMBAR | Status: FUNCTIONAL

## 2024-11-22 DEVICE — ROD, RELINE-0, 5.5 X 90MM, LORDOTIC: Type: IMPLANTABLE DEVICE | Site: SPINE LUMBAR | Status: FUNCTIONAL

## 2024-11-22 RX ORDER — LIDOCAINE HYDROCHLORIDE 20 MG/ML
INJECTION, SOLUTION INFILTRATION; PERINEURAL AS NEEDED
Status: DISCONTINUED | OUTPATIENT
Start: 2024-11-22 | End: 2024-11-22

## 2024-11-22 RX ORDER — SODIUM CHLORIDE, SODIUM LACTATE, POTASSIUM CHLORIDE, CALCIUM CHLORIDE 600; 310; 30; 20 MG/100ML; MG/100ML; MG/100ML; MG/100ML
100 INJECTION, SOLUTION INTRAVENOUS CONTINUOUS
Status: ACTIVE | OUTPATIENT
Start: 2024-11-22 | End: 2024-11-22

## 2024-11-22 RX ORDER — POLYETHYLENE GLYCOL 3350 17 G/17G
17 POWDER, FOR SOLUTION ORAL 2 TIMES DAILY PRN
COMMUNITY
Start: 2024-11-22 | End: 2024-12-22

## 2024-11-22 RX ORDER — POLYETHYLENE GLYCOL 3350 17 G/17G
17 POWDER, FOR SOLUTION ORAL DAILY
Status: DISCONTINUED | OUTPATIENT
Start: 2024-11-22 | End: 2024-11-24 | Stop reason: HOSPADM

## 2024-11-22 RX ORDER — DIPHENHYDRAMINE HYDROCHLORIDE 50 MG/ML
12.5 INJECTION INTRAMUSCULAR; INTRAVENOUS EVERY 6 HOURS PRN
Status: DISCONTINUED | OUTPATIENT
Start: 2024-11-22 | End: 2024-11-24 | Stop reason: HOSPADM

## 2024-11-22 RX ORDER — CEFAZOLIN SODIUM 2 G/100ML
2 INJECTION, SOLUTION INTRAVENOUS ONCE
Status: DISCONTINUED | OUTPATIENT
Start: 2024-11-22 | End: 2024-11-22 | Stop reason: HOSPADM

## 2024-11-22 RX ORDER — ONDANSETRON HYDROCHLORIDE 2 MG/ML
4 INJECTION, SOLUTION INTRAVENOUS ONCE AS NEEDED
Status: DISCONTINUED | OUTPATIENT
Start: 2024-11-22 | End: 2024-11-22 | Stop reason: HOSPADM

## 2024-11-22 RX ORDER — DEXTROSE 50 % IN WATER (D50W) INTRAVENOUS SYRINGE
12.5
Status: DISCONTINUED | OUTPATIENT
Start: 2024-11-22 | End: 2024-11-24 | Stop reason: HOSPADM

## 2024-11-22 RX ORDER — METHOCARBAMOL 500 MG/1
TABLET, FILM COATED ORAL
Qty: 60 TABLET | Refills: 2 | Status: SHIPPED | OUTPATIENT
Start: 2024-11-22

## 2024-11-22 RX ORDER — BISACODYL 5 MG
10 TABLET, DELAYED RELEASE (ENTERIC COATED) ORAL DAILY PRN
Status: DISCONTINUED | OUTPATIENT
Start: 2024-11-22 | End: 2024-11-24 | Stop reason: HOSPADM

## 2024-11-22 RX ORDER — LABETALOL HYDROCHLORIDE 5 MG/ML
5 INJECTION, SOLUTION INTRAVENOUS ONCE AS NEEDED
Status: DISCONTINUED | OUTPATIENT
Start: 2024-11-22 | End: 2024-11-22 | Stop reason: HOSPADM

## 2024-11-22 RX ORDER — OXYCODONE HYDROCHLORIDE 5 MG/1
5 TABLET ORAL EVERY 4 HOURS PRN
Status: DISCONTINUED | OUTPATIENT
Start: 2024-11-22 | End: 2024-11-22 | Stop reason: HOSPADM

## 2024-11-22 RX ORDER — GABAPENTIN 300 MG/1
600 CAPSULE ORAL ONCE
Status: COMPLETED | OUTPATIENT
Start: 2024-11-22 | End: 2024-11-22

## 2024-11-22 RX ORDER — OXYCODONE HYDROCHLORIDE 5 MG/1
5 TABLET ORAL EVERY 4 HOURS PRN
Qty: 42 TABLET | Refills: 0 | Status: SHIPPED | OUTPATIENT
Start: 2024-11-22 | End: 2024-11-29 | Stop reason: SDUPTHER

## 2024-11-22 RX ORDER — CEFAZOLIN SODIUM 2 G/100ML
2 INJECTION, SOLUTION INTRAVENOUS EVERY 8 HOURS
Status: COMPLETED | OUTPATIENT
Start: 2024-11-22 | End: 2024-11-23

## 2024-11-22 RX ORDER — METHOCARBAMOL 100 MG/ML
INJECTION, SOLUTION INTRAMUSCULAR; INTRAVENOUS AS NEEDED
Status: DISCONTINUED | OUTPATIENT
Start: 2024-11-22 | End: 2024-11-22

## 2024-11-22 RX ORDER — PROCHLORPERAZINE 25 MG/1
25 SUPPOSITORY RECTAL EVERY 12 HOURS PRN
Status: DISCONTINUED | OUTPATIENT
Start: 2024-11-22 | End: 2024-11-24 | Stop reason: HOSPADM

## 2024-11-22 RX ORDER — OXYCODONE HYDROCHLORIDE 5 MG/1
10 TABLET ORAL EVERY 4 HOURS PRN
Status: DISCONTINUED | OUTPATIENT
Start: 2024-11-22 | End: 2024-11-22 | Stop reason: HOSPADM

## 2024-11-22 RX ORDER — ROCURONIUM BROMIDE 10 MG/ML
INJECTION, SOLUTION INTRAVENOUS AS NEEDED
Status: DISCONTINUED | OUTPATIENT
Start: 2024-11-22 | End: 2024-11-22

## 2024-11-22 RX ORDER — AMLODIPINE BESYLATE 10 MG/1
10 TABLET ORAL DAILY
Status: DISCONTINUED | OUTPATIENT
Start: 2024-11-23 | End: 2024-11-24 | Stop reason: HOSPADM

## 2024-11-22 RX ORDER — ACETAMINOPHEN 325 MG/1
975 TABLET ORAL ONCE
Status: COMPLETED | OUTPATIENT
Start: 2024-11-22 | End: 2024-11-22

## 2024-11-22 RX ORDER — OXYCODONE HYDROCHLORIDE 5 MG/1
10 TABLET ORAL EVERY 4 HOURS PRN
Status: DISCONTINUED | OUTPATIENT
Start: 2024-11-22 | End: 2024-11-24 | Stop reason: HOSPADM

## 2024-11-22 RX ORDER — TRANEXAMIC ACID 100 MG/ML
INJECTION, SOLUTION INTRAVENOUS AS NEEDED
Status: DISCONTINUED | OUTPATIENT
Start: 2024-11-22 | End: 2024-11-22

## 2024-11-22 RX ORDER — ONDANSETRON HYDROCHLORIDE 2 MG/ML
4 INJECTION, SOLUTION INTRAVENOUS EVERY 8 HOURS PRN
Status: DISCONTINUED | OUTPATIENT
Start: 2024-11-22 | End: 2024-11-24 | Stop reason: HOSPADM

## 2024-11-22 RX ORDER — CHLORPHENIRAMINE MALEATE 4 MG
4 TABLET ORAL EVERY 6 HOURS PRN
Status: DISCONTINUED | OUTPATIENT
Start: 2024-11-22 | End: 2024-11-22

## 2024-11-22 RX ORDER — ACETAMINOPHEN 500 MG
1000 TABLET ORAL EVERY 8 HOURS
COMMUNITY
Start: 2024-11-22 | End: 2024-12-06

## 2024-11-22 RX ORDER — ONDANSETRON HYDROCHLORIDE 2 MG/ML
INJECTION, SOLUTION INTRAVENOUS AS NEEDED
Status: DISCONTINUED | OUTPATIENT
Start: 2024-11-22 | End: 2024-11-22

## 2024-11-22 RX ORDER — CEFAZOLIN 1 G/1
INJECTION, POWDER, FOR SOLUTION INTRAVENOUS AS NEEDED
Status: DISCONTINUED | OUTPATIENT
Start: 2024-11-22 | End: 2024-11-22

## 2024-11-22 RX ORDER — OXYCODONE HYDROCHLORIDE 5 MG/1
5 TABLET ORAL EVERY 4 HOURS PRN
Status: DISCONTINUED | OUTPATIENT
Start: 2024-11-22 | End: 2024-11-24 | Stop reason: HOSPADM

## 2024-11-22 RX ORDER — TAMSULOSIN HYDROCHLORIDE 0.4 MG/1
0.4 CAPSULE ORAL DAILY
Status: DISCONTINUED | OUTPATIENT
Start: 2024-11-22 | End: 2024-11-24 | Stop reason: HOSPADM

## 2024-11-22 RX ORDER — HYDROMORPHONE HYDROCHLORIDE 1 MG/ML
INJECTION, SOLUTION INTRAMUSCULAR; INTRAVENOUS; SUBCUTANEOUS AS NEEDED
Status: DISCONTINUED | OUTPATIENT
Start: 2024-11-22 | End: 2024-11-22

## 2024-11-22 RX ORDER — ACETAMINOPHEN 325 MG/1
650 TABLET ORAL EVERY 4 HOURS PRN
Status: DISCONTINUED | OUTPATIENT
Start: 2024-11-22 | End: 2024-11-22 | Stop reason: HOSPADM

## 2024-11-22 RX ORDER — PHENYLEPHRINE HCL IN 0.9% NACL 1 MG/10 ML
SYRINGE (ML) INTRAVENOUS AS NEEDED
Status: DISCONTINUED | OUTPATIENT
Start: 2024-11-22 | End: 2024-11-22

## 2024-11-22 RX ORDER — PROCHLORPERAZINE MALEATE 10 MG
10 TABLET ORAL EVERY 6 HOURS PRN
Status: DISCONTINUED | OUTPATIENT
Start: 2024-11-22 | End: 2024-11-24 | Stop reason: HOSPADM

## 2024-11-22 RX ORDER — NALOXONE HYDROCHLORIDE 0.4 MG/ML
0.2 INJECTION, SOLUTION INTRAMUSCULAR; INTRAVENOUS; SUBCUTANEOUS EVERY 5 MIN PRN
Status: DISCONTINUED | OUTPATIENT
Start: 2024-11-22 | End: 2024-11-24 | Stop reason: HOSPADM

## 2024-11-22 RX ORDER — PROPOFOL 10 MG/ML
INJECTION, EMULSION INTRAVENOUS AS NEEDED
Status: DISCONTINUED | OUTPATIENT
Start: 2024-11-22 | End: 2024-11-22

## 2024-11-22 RX ORDER — ACETAMINOPHEN 325 MG/1
975 TABLET ORAL EVERY 8 HOURS
Status: DISCONTINUED | OUTPATIENT
Start: 2024-11-22 | End: 2024-11-24 | Stop reason: HOSPADM

## 2024-11-22 RX ORDER — FENTANYL CITRATE 50 UG/ML
INJECTION, SOLUTION INTRAMUSCULAR; INTRAVENOUS AS NEEDED
Status: DISCONTINUED | OUTPATIENT
Start: 2024-11-22 | End: 2024-11-22

## 2024-11-22 RX ORDER — LIDOCAINE HYDROCHLORIDE 10 MG/ML
0.1 INJECTION, SOLUTION EPIDURAL; INFILTRATION; INTRACAUDAL; PERINEURAL ONCE
Status: DISCONTINUED | OUTPATIENT
Start: 2024-11-22 | End: 2024-11-22 | Stop reason: HOSPADM

## 2024-11-22 RX ORDER — KETOROLAC TROMETHAMINE 30 MG/ML
INJECTION, SOLUTION INTRAMUSCULAR; INTRAVENOUS AS NEEDED
Status: DISCONTINUED | OUTPATIENT
Start: 2024-11-22 | End: 2024-11-22

## 2024-11-22 RX ORDER — SODIUM CHLORIDE 0.9 G/100ML
IRRIGANT IRRIGATION AS NEEDED
Status: DISCONTINUED | OUTPATIENT
Start: 2024-11-22 | End: 2024-11-22 | Stop reason: HOSPADM

## 2024-11-22 RX ORDER — PROCHLORPERAZINE EDISYLATE 5 MG/ML
10 INJECTION INTRAMUSCULAR; INTRAVENOUS EVERY 6 HOURS PRN
Status: DISCONTINUED | OUTPATIENT
Start: 2024-11-22 | End: 2024-11-24 | Stop reason: HOSPADM

## 2024-11-22 RX ORDER — KETOROLAC TROMETHAMINE 30 MG/ML
15 INJECTION, SOLUTION INTRAMUSCULAR; INTRAVENOUS EVERY 6 HOURS SCHEDULED
Status: COMPLETED | OUTPATIENT
Start: 2024-11-22 | End: 2024-11-24

## 2024-11-22 RX ORDER — METHOCARBAMOL 500 MG/1
1000 TABLET, FILM COATED ORAL 3 TIMES DAILY
Status: DISCONTINUED | OUTPATIENT
Start: 2024-11-22 | End: 2024-11-24 | Stop reason: HOSPADM

## 2024-11-22 RX ORDER — GABAPENTIN 100 MG/1
200 CAPSULE ORAL NIGHTLY
Status: DISCONTINUED | OUTPATIENT
Start: 2024-11-23 | End: 2024-11-24 | Stop reason: HOSPADM

## 2024-11-22 RX ORDER — ONDANSETRON 4 MG/1
4 TABLET, ORALLY DISINTEGRATING ORAL EVERY 8 HOURS PRN
Status: DISCONTINUED | OUTPATIENT
Start: 2024-11-22 | End: 2024-11-24 | Stop reason: HOSPADM

## 2024-11-22 RX ORDER — SODIUM CHLORIDE, SODIUM LACTATE, POTASSIUM CHLORIDE, CALCIUM CHLORIDE 600; 310; 30; 20 MG/100ML; MG/100ML; MG/100ML; MG/100ML
100 INJECTION, SOLUTION INTRAVENOUS CONTINUOUS
Status: DISCONTINUED | OUTPATIENT
Start: 2024-11-22 | End: 2024-11-24 | Stop reason: HOSPADM

## 2024-11-22 RX ORDER — MIDAZOLAM HYDROCHLORIDE 1 MG/ML
INJECTION INTRAMUSCULAR; INTRAVENOUS AS NEEDED
Status: DISCONTINUED | OUTPATIENT
Start: 2024-11-22 | End: 2024-11-22

## 2024-11-22 SDOH — HEALTH STABILITY: MENTAL HEALTH: CURRENT SMOKER: 0

## 2024-11-22 ASSESSMENT — COGNITIVE AND FUNCTIONAL STATUS - GENERAL
WALKING IN HOSPITAL ROOM: A LITTLE
DRESSING REGULAR LOWER BODY CLOTHING: A LITTLE
STANDING UP FROM CHAIR USING ARMS: A LITTLE
TURNING FROM BACK TO SIDE WHILE IN FLAT BAD: A LITTLE
HELP NEEDED FOR BATHING: A LITTLE
CLIMB 3 TO 5 STEPS WITH RAILING: A LITTLE
DRESSING REGULAR UPPER BODY CLOTHING: A LITTLE
MOVING TO AND FROM BED TO CHAIR: A LITTLE
MOBILITY SCORE: 19
DAILY ACTIVITIY SCORE: 21

## 2024-11-22 ASSESSMENT — PAIN - FUNCTIONAL ASSESSMENT
PAIN_FUNCTIONAL_ASSESSMENT: 0-10

## 2024-11-22 ASSESSMENT — PAIN SCALES - GENERAL
PAIN_LEVEL: 0
PAINLEVEL_OUTOF10: 5 - MODERATE PAIN
PAINLEVEL_OUTOF10: 5 - MODERATE PAIN
PAINLEVEL_OUTOF10: 10 - WORST POSSIBLE PAIN
PAINLEVEL_OUTOF10: 10 - WORST POSSIBLE PAIN
PAINLEVEL_OUTOF10: 5 - MODERATE PAIN
PAINLEVEL_OUTOF10: 7
PAINLEVEL_OUTOF10: 10 - WORST POSSIBLE PAIN
PAINLEVEL_OUTOF10: 0 - NO PAIN
PAINLEVEL_OUTOF10: 8
PAINLEVEL_OUTOF10: 10 - WORST POSSIBLE PAIN
PAINLEVEL_OUTOF10: 0 - NO PAIN
PAINLEVEL_OUTOF10: 5 - MODERATE PAIN

## 2024-11-22 ASSESSMENT — PAIN DESCRIPTION - LOCATION: LOCATION: BACK

## 2024-11-22 ASSESSMENT — PAIN DESCRIPTION - ORIENTATION: ORIENTATION: LOWER

## 2024-11-22 NOTE — ANESTHESIA PROCEDURE NOTES
Airway  Date/Time: 11/22/2024 2:00 PM  Urgency: elective    Airway not difficult    Staffing  Performed: CRNA   Authorized by: Samy Rene MD    Performed by: JANA Matthews-HAZEL  Patient location during procedure: OR    Indications and Patient Condition  Indications for airway management: anesthesia  Sedation level: deep  Preoxygenated: yes  Patient position: sniffing  Mask difficulty assessment: 1 - vent by mask    Final Airway Details  Final airway type: endotracheal airway      Successful airway: ETT  Cuffed: yes   Successful intubation technique: video laryngoscopy  Facilitating devices/methods: intubating stylet  Endotracheal tube insertion site: oral  Blade: Megha  Blade size: #4  ETT size (mm): 8.0  Cormack-Lehane Classification: grade I - full view of glottis  Placement verified by: chest auscultation   Measured from: lips  ETT to lips (cm): 22  Number of attempts at approach: 1  Number of other approaches attempted: 0    Additional Comments  Atraumatic placement. Teeth intact.

## 2024-11-22 NOTE — OP NOTE
L2-L3, L4-L5 Lumbar Spine Laminectomy and Fusion Operative Note     Date: 2024  OR Location: [facility]    Name: Chris Monique : 1950, Age: 74 y.o., MRN: [id number], Sex: male    Diagnosis  Pre-op Diagnosis      * Lumbar radiculopathy [M54.16]     * Neurogenic claudication due to lumbar spinal stenosis [M48.062] Post-op Diagnosis     * Lumbar radiculopathy [M54.16]     * Neurogenic claudication due to lumbar spinal stenosis [M48.062]     Procedures  L2-L3, L4-L5 Lumbar Spine Laminectomy and Fusion  65601 - [place] ARTHRODESIS COMBINED TQ 1NTRSPC LUMBAR    [place] GLASER FACETECTOMY & FORAMOTOMY 1 VRT SGM LUMBAR [84338]  [place] GLASER FACETECTOMY&FORAMOT 1 VRT SGM EA ADDL SGM [09417]  [place] INSJ BIOMCHN DEV INTERVERTEBRAL DSC SPC W/ARTHRD []  [place] POSTERIOR SEGMENTAL INSTRUMENTATION 3-6 VRT SEG []  [place] ALLOGRAFT FOR SPINE SURGERY ONLY MORSELIZED []  [place] AUTOGRAFT SPINE SURGERY LOCAL FROM SAME INCISION []  Surgeons      * Juan Jose Jo - Primary    Resident/Fellow/Other Assistant:  Surgeons and Role:  * No surgeons found with a matching role *    Staff:   Circulator: Bernadette Yanez Person: Rosa Yanez Person: Evy Burden Circulator: Claudia Burden Scrub: [place]    Anesthesia Staff: Anesthesiologist: Samy Rene MD; Jacques Redmond DO  CRNA: JANA Matthews-CRNA    Procedure Summary  Anesthesia: General  ASA: II  Estimated Blood Loss: 350mL  Intra-op Medications:   Administrations occurring from 1300 to 1530 on 24:   Medication Name Total Dose   thrombin-bovine (JMI) 5,000 unit topical solution 10,000 Units   gelatin absorbable (Gelfoam) 100 sponge 1 each   sodium chloride 0.9 % irrigation solution 2,000 mL   ceFAZolin (Ancef) vial 1 g 2 g   dexAMETHasone (Decadron) injection 4 mg/mL 10 mg   fentaNYL (Sublimaze) injection 50 mcg/mL 100 mcg   HYDROmorphone (Dilaudid) injection 1 mg/mL 0.5 mg   ketamine injection 50 mg/ 5 mL (10 mg/mL) 40 mg   lidocaine (Xylocaine) injection 2 % 100 mg    midazolam PF (Versed) injection 1 mg/mL 2 mg   phenylephrine 100 mcg/mL syringe 10 mL (prefilled) 400 mcg   propofol (Diprivan) injection 10 mg/mL 150 mg   rocuronium (ZeMuron) 50 mg/5 mL injection 80 mg   NaCl 0.9 % bolus Cannot be calculated   tranexamic acid (Cyklokapron) injection 2,000 mg              Anesthesia Record               Intraprocedure I/O Totals          Intake    Tranexamic Acid 0.00 mL    The total shown is the total volume documented since Anesthesia Start was filed.    Total Intake 0 mL          Specimen: No specimens collected              Drains and/or Catheters:   Closed/Suction Drain Inferior Back Accordion 10 Fr. (Active)       Closed/Suction Drain Inferior Back Accordion 10 Fr. (Active)       Tourniquet Times:         Implants:  Implants       Type Name Action Serial No.      Screw FIBERS, PROPEL DBM, MEDIUM - [id number] Implanted      Screw FIBERS, PROPEL DBM, MEDIUM - [id number] Implanted       FIBERS, PROPEL DBM Implanted      Screw SCREW, RELINE-O, 6.5X50MM 2S POLYAXIAL - SNA - [id number] Implanted NA     Screw SCREW, RELINE-O, 6.5X45MM 2S POLYAXIAL - SNA - [id number] Implanted NA     Neuro Interventional Implant SCREW, RELINE LOCK, 5.5MM OPEN TULIP - SNA - [id number] Implanted NA     Screw ISA, RELINE-0, 5.5 X 90MM, LORDOTIC - SNA - [id number] Implanted NA     Screw ISA, RELINE-0, 5.5 X 100MM, LORDOTIC - SNA - [id number] Implanted NA              Findings: Critical central stenosis L2-3, L3-4, L4-5.  Degenerative scoliosis.    Initial procedure was planned for laminectomy and fusion from L3-5, however after decompressing up to the L3-4 interspace the dural turgor appeared quite poor as if there was still constriction proximally.  There was no evidence of CSF leak, so I decided to extend the laminectomy up to the L2-3 level.  There was severe central stenosis noted at this level, and the turgor of the dura immediately returned to normal following extension of the laminectomy.   Because of the degenerative scoliosis we also had to extend the fusion up to the L2 level as well for a final procedure of L2-5 laminectomy and fusion with instrumentation.    Indications: Chris Monique is an 74 y.o. male who is having surgery for Lumbar radiculopathy [M54.16]  Neurogenic claudication due to lumbar spinal stenosis [M48.062].  Patient present with intractable lumbar radiculopathy and neurogenic claudication.  He had failed nonsurgical treatment.  X-rays and MRI demonstrated degenerative scoliosis with severe central stenosis from L2-5.    The patient was seen in the preoperative area. The risks, benefits, complications, treatment options, non-operative alternatives, expected recovery and outcomes were discussed with the patient. The possibilities of reaction to medication, pulmonary aspiration, injury to surrounding structures, bleeding, recurrent infection, the need for additional procedures, failure to diagnose a condition, and creating a complication requiring transfusion or operation were discussed with the patient. The patient concurred with the proposed plan, giving informed consent.  The site of surgery was properly noted/marked if necessary per policy. The patient has been actively warmed in preoperative area. Preoperative antibiotics have been ordered and given within 1 hours of incision. Venous thrombosis prophylaxis have been ordered including bilateral sequential compression devices    Procedure Details: Patient was brought back to the operating room and general anesthesia was induced.  SCDs were placed on the lower extremities.  the patient was then positioned prone on the Nomi frame, all bony prominences were well-padded.  A timeout was performed and preoperative antibiotics were given.  The lumbar spine was then prepped and draped in the usual sterile fashion.    A longitudinal midline incision was made over the lumbar spine.  Spinous processes were identified in the midline a  lateral x-ray was taken with a Kocher marking the L3-4 interspace to verify operative levels.  The muscle was then dissected subperiosteally off the spine exposing the levels of interest.  The pars interarticularis and medial border of the facet joints were identified at each level. The transverse processes for the intended fusion levels from L3-5 were exposed bilaterally down to the level of the intertransverse membrane.  The posterolateral gutters were then packed with sponges until the fusion portion of the procedure.  Deep retractors were then placed.      A central laminectomy was then performed.  The spinous processes of the operative levels were excised using a Leksell rongeur.  The caudal most level was identified and a Kerrison rongeur was used to then create a laminectomy centrally extending up to the second level.  See the operative findings above regarding the severity of central stenosis.    Following central decompression each individual nerve root from L3-5 was then identified and protected.  The pars interarticularis level was undercut using the Kerrison and a thorough foraminotomy was performed until a Banner elevator was easily passed into the neural foramen.  Lateral recess decompression at each level was then carried out by undercutting the facet joints, partial medial facetectomies were performed.  At this point all the nerve roots were freely mobile and well decompressed.  The epidural space was packed with FloSeal and Gelfoam patties for hemostasis.  Per the comments above, there was severe stenosis still noted with failure for the dural turgor to improve to normal baseline quality.  I extended the laminectomy up to the L2-3 level and severe central stenosis was noted at this level as well.  Therefore the individual nerve roots from L2-L5 were each identified and thoroughly decompressed.    The sponges were then removed from the gutters.  All excess soft tissue along the lateral border of the  spine was excised using Bovie electrocautery and pituitary rongeurs.  The facet joints at each level were exposed down to the level of the native joint.  Pedicle screw instrumentation was placed at the operative levels bilaterally in a standard fashion.  The midportion of each pedicle was identified using a high-speed bur.  A Lenke pedicle finder was then inserted into each pedicle, the hole was sounded for 4 walls and a bottom using a ball-tipped probe. 6.5mm pedicle screws were then inserted. AP and lateral fluoroscopic images were obtained verifying correct operative levels and placement of the screws within the respective pedicles.     The posterolateral gutters were copiously irrigated with antibiotic saline.  The transverse processes from L2-5 were decorticated bilaterally using a high-speed bur.  The local laminectomy bone was morsellized and packed equally among the right and left gutters.  This was augmented with 20 cc of propel DBM fibers to complete the fusion.  Two rods were then fashioned into lordosis and held into place using locking caps. The caps were then finally tightened using the torque wrench.     The central portion of the wound was once again irrigated.  Meticulous epidural hemostasis was not obtained.  The wound was then closed in layers over suction drains. Liquiband and a dry sterile dressing were then applied.        Complications:  None; patient tolerated the procedure well.    Disposition: PACU - hemodynamically stable.  Condition: stable                 Additional Details:     Attending Attestation: I was present and scrubbed for the key portions of the procedure.    Juan Jose Jo  Phone Number: [phone]

## 2024-11-22 NOTE — ANESTHESIA POSTPROCEDURE EVALUATION
Patient: Chris Monique    Procedure Summary       Date: 11/22/24 Room / Location: [facility] 03 / [facility]    Anesthesia Start: 1353 Anesthesia Stop: 1626    Procedure: L2-L3, L4-L5 Lumbar Spine Laminectomy and Fusion (Spine Lumbar) Diagnosis:       Lumbar radiculopathy      Neurogenic claudication due to lumbar spinal stenosis      (Lumbar radiculopathy [M54.16])      (Neurogenic claudication due to lumbar spinal stenosis [M48.062])    Surgeons: Juan Jose Jo MD Responsible Provider: Samy Rene MD    Anesthesia Type: general ASA Status: 2            Anesthesia Type: general    Vitals Value Taken Time   /82 11/22/24 1634   Temp 36.4 °C (97.5 °F) 11/22/24 1625   Pulse 81 11/22/24 1644   Resp 18 11/22/24 1630   SpO2 98 % 11/22/24 1644   Vitals shown include unfiled device data.    Anesthesia Post Evaluation    Patient location during evaluation: bedside  Patient participation: complete - patient cannot participate  Level of consciousness: awake  Pain score: 0  Pain management: adequate  Airway patency: patent  Cardiovascular status: acceptable and hemodynamically stable  Respiratory status: acceptable and nonlabored ventilation  Hydration status: acceptable  Postoperative Nausea and Vomiting: none        No notable events documented.

## 2024-11-22 NOTE — ANESTHESIA PREPROCEDURE EVALUATION
"Patient: Chris Monique    Procedure Information       Date/Time: 11/22/24 1300    Procedure: L3-L5 Lumbar Spine Laminectomy and Fusion (Spine Lumbar)    Location: [facility] OR 03 / Virtual [facility] OR    Surgeons: Juan Jose Jo MD        HPI: 74 year old male presenting for L3-5 fusion and laminectomy. History significant for HTN, spinal stenosis, peripheral neuropathy    Denies recent cough, cold, runny nose, fever, flu like symptoms. No exertional angina nor exertional dyspnea. No orthopnea, paroxysmal nocturnal dyspnea, leg swelling. Denies palpitations. No issues with bleeding nor blood clots.    Anesthesia history: no issues    Visit Vitals  /82   Pulse 97   Temp 36.3 °C (97.3 °F) (Temporal)   Resp 16   Ht 1.778 m (5' 10\")   Wt 97.3 kg (214 lb 8.1 oz)   SpO2 97%   BMI 30.78 kg/m²   Smoking Status Former   BSA 2.19 m²        [unfilled]      No results found for this or any previous visit from the past 1095 days.       Encounter Date: 11/15/24   ECG 12 Lead   Result Value    Ventricular Rate 83    Atrial Rate 83    MN Interval 148    QRS Duration 98    QT Interval 414    QTC Calculation(Bazett) 486    P Axis 36    R Axis -37    T Axis 24    QRS Count 13    Q Onset 222    P Onset 148    P Offset 187    T Offset 429    QTC Fredericia 461    Narrative    Normal sinus rhythm  Left axis deviation  Minimal voltage criteria for LVH, may be normal variant  Prolonged QT  Abnormal ECG  When compared with ECG of 12-APR-2024 07:30,  No significant change was found  Confirmed by Anthony Pennington ([id number]) on 11/15/2024 1:31:24 PM        Stress test: none      Relevant Problems   Cardiac   (+) HTN (hypertension)      Neuro   (+) Lumbar radiculopathy      Endocrine   (+) Obesity      Musculoskeletal   (+) Cervical spondylosis with myelopathy   (+) Lumbar stenosis with neurogenic claudication       Clinical information reviewed:   Tobacco  Allergies  Meds   Med Hx  Surg Hx   Fam Hx  Soc Hx        NPO Detail:  NPO/Void " Status  Carbohydrate Drink Given Prior to Surgery? : N  Date of Last Liquid: 11/22/24  Time of Last Liquid: 1000  Date of Last Solid: 11/21/24  Time of Last Solid: 2300  Last Intake Type: Clear fluids  Time of Last Void: 1221         Physical Exam    Airway  Mallampati: II  TM distance: >3 FB  Neck ROM: limited     Cardiovascular   Rhythm: regular  Rate: normal     Dental   Comments: Partial dentures removed   Pulmonary   Breath sounds clear to auscultation     Abdominal   (+) obese  Abdomen: soft             Anesthesia Plan    History of general anesthesia?: yes  History of complications of general anesthesia?: no    ASA 2     general     The patient is not a current smoker.    intravenous induction   Postoperative administration of opioids is intended.  Trial extubation is planned.  Anesthetic plan and risks discussed with patient.  Use of blood products discussed with patient who consented to blood products.    Plan discussed with CRNA.

## 2024-11-23 ENCOUNTER — PHARMACY VISIT (OUTPATIENT)
Dept: PHARMACY | Facility: CLINIC | Age: 74
End: 2024-11-23
Payer: MEDICARE

## 2024-11-23 PROBLEM — M54.16 LUMBAR RADICULOPATHY: Status: RESOLVED | Noted: 2024-10-16 | Resolved: 2024-11-23

## 2024-11-23 PROCEDURE — 2500000004 HC RX 250 GENERAL PHARMACY W/ HCPCS (ALT 636 FOR OP/ED): Performed by: PHYSICIAN ASSISTANT

## 2024-11-23 PROCEDURE — 97116 GAIT TRAINING THERAPY: CPT | Mod: GP

## 2024-11-23 PROCEDURE — 97161 PT EVAL LOW COMPLEX 20 MIN: CPT | Mod: GP

## 2024-11-23 PROCEDURE — 9420000001 HC RT PATIENT EDUCATION 5 MIN

## 2024-11-23 PROCEDURE — 7100000011 HC EXTENDED STAY RECOVERY HOURLY - NURSING UNIT

## 2024-11-23 PROCEDURE — 2500000002 HC RX 250 W HCPCS SELF ADMINISTERED DRUGS (ALT 637 FOR MEDICARE OP, ALT 636 FOR OP/ED): Performed by: PHYSICIAN ASSISTANT

## 2024-11-23 PROCEDURE — 2500000001 HC RX 250 WO HCPCS SELF ADMINISTERED DRUGS (ALT 637 FOR MEDICARE OP): Performed by: PHYSICIAN ASSISTANT

## 2024-11-23 PROCEDURE — 97165 OT EVAL LOW COMPLEX 30 MIN: CPT | Mod: GO

## 2024-11-23 ASSESSMENT — PAIN SCALES - GENERAL
PAINLEVEL_OUTOF10: 0 - NO PAIN
PAINLEVEL_OUTOF10: 8
PAINLEVEL_OUTOF10: 8
PAINLEVEL_OUTOF10: 6

## 2024-11-23 ASSESSMENT — COGNITIVE AND FUNCTIONAL STATUS - GENERAL
MOVING TO AND FROM BED TO CHAIR: A LITTLE
HELP NEEDED FOR BATHING: A LITTLE
STANDING UP FROM CHAIR USING ARMS: A LITTLE
MOBILITY SCORE: 17
DAILY ACTIVITIY SCORE: 21
TURNING FROM BACK TO SIDE WHILE IN FLAT BAD: A LITTLE
MOVING FROM LYING ON BACK TO SITTING ON SIDE OF FLAT BED WITH BEDRAILS: A LITTLE
DRESSING REGULAR LOWER BODY CLOTHING: A LITTLE
CLIMB 3 TO 5 STEPS WITH RAILING: A LOT
WALKING IN HOSPITAL ROOM: A LITTLE
TOILETING: A LITTLE

## 2024-11-23 ASSESSMENT — PAIN - FUNCTIONAL ASSESSMENT
PAIN_FUNCTIONAL_ASSESSMENT: 0-10

## 2024-11-23 ASSESSMENT — ACTIVITIES OF DAILY LIVING (ADL)
ADL_ASSISTANCE: INDEPENDENT
ADL_ASSISTANCE: INDEPENDENT

## 2024-11-23 ASSESSMENT — PAIN DESCRIPTION - LOCATION: LOCATION: BACK

## 2024-11-23 NOTE — CARE PLAN
Problem: Pain - Adult  Goal: Verbalizes/displays adequate comfort level or baseline comfort level  Outcome: Progressing  Flowsheets (Taken 11/22/2024 2351)  Verbalizes/displays adequate comfort level or baseline comfort level:   Notify Licensed Independent Practitioner if interventions unsuccessful or patient reports new pain   Implement non-pharmacological measures as appropriate and evaluate response   Administer analgesics based on type and severity of pain and evaluate response   Assess pain using appropriate pain scale   Encourage patient to monitor pain and request assistance     Problem: Safety - Adult  Goal: Free from fall injury  Outcome: Progressing  Flowsheets (Taken 11/22/2024 2351)  Free from fall injury:   Based on caregiver fall risk screen, instruct family/caregiver to ask for assistance with transferring infant if caregiver noted to have fall risk factors   Instruct family/caregiver on patient safety     Problem: Discharge Planning  Goal: Discharge to home or other facility with appropriate resources  Outcome: Progressing  Flowsheets (Taken 11/22/2024 2351)  Discharge to home or other facility with appropriate resources:   Refer to discharge planning if patient needs post-hospital services based on physician order or complex needs related to functional status, cognitive ability or social support system   Identify discharge learning needs (meds, wound care, etc)   Arrange for needed discharge resources and transportation as appropriate   Identify barriers to discharge with patient and caregiver     Problem: Chronic Conditions and Co-morbidities  Goal: Patient's chronic conditions and co-morbidity symptoms are monitored and maintained or improved  Outcome: Progressing  Flowsheets (Taken 11/22/2024 2351)  Care Plan - Patient's Chronic Conditions and Co-Morbidity Symptoms are Monitored and Maintained or Improved:   Collaborate with multidisciplinary team to address chronic and comorbid conditions and  prevent exacerbation or deterioration   Update acute care plan with appropriate goals if chronic or comorbid symptoms are exacerbated and prevent overall improvement and discharge   Monitor and assess patient's chronic conditions and comorbid symptoms for stability, deterioration, or improvement    The clinical goals for the shift include Control pain, rest

## 2024-11-23 NOTE — PROGRESS NOTES
Occupational Therapy    Evaluation    Patient Name: Chris Monique  MRN: [id number]  Department: [facility] A 7  Room: [facility]71-  Today's Date: 11/23/2024  Time Calculation  Start Time: 1530  Stop Time: 1545  Time Calculation (min): 15 min        Assessment:  OT Assessment: Pt presents to occupational therapy evaluation following lumbar laminectomy and fusion. He presents with mild impulsiveness with verbal reminders for safety considerations and to slow down. Pt has good knowledge of spinal precautions and log roll technique. Pt appears near functional baseline in regards to ADL performance and functional mobility/transfers.  Prognosis: Good  Barriers to Discharge: None  Evaluation/Treatment Tolerance: Patient tolerated treatment well  Medical Staff Made Aware: Yes  End of Session Communication: Bedside nurse  End of Session Patient Position: Up in chair, Alarm on  OT Assessment Results: Decreased ADL status, Decreased upper extremity strength  Prognosis: Good  Barriers to Discharge: None  Evaluation/Treatment Tolerance: Patient tolerated treatment well  Medical Staff Made Aware: Yes  Strengths: Ability to acquire knowledge, Attitude of self  Barriers to Participation: Comorbidities  Plan:  Treatment Interventions: ADL retraining, Functional transfer training, UE strengthening/ROM  OT Frequency: One time follow up visit  OT Discharge Recommendations: No OT needed after discharge  Equipment Recommended upon Discharge: Wheeled walker  OT Recommended Transfer Status: Stand by assist, Assist of 1  OT - OK to Discharge: Yes (Per POC)  Treatment Interventions: ADL retraining, Functional transfer training, UE strengthening/ROM    Subjective   Current Problem:  1. Lumbar radiculopathy  FL less than 1 hour    FL less than 1 hour      2. Spinal stenosis of lumbar region with neurogenic claudication  acetaminophen (Tylenol Extra Strength) 500 mg tablet    polyethylene glycol (Miralax) 17 gram/dose powder    oxyCODONE (Roxicodone) 5 mg  immediate release tablet    methocarbamol (Robaxin) 500 mg tablet      3. Neurogenic claudication due to lumbar spinal stenosis          General:  General  Reason for Referral: 75 y/o M s/p L2-L3, L4-L5 lumbar spine laminectomy and fusion  Referred By: NUSRAT Jo  Past Medical History Relevant to Rehab: Lumbar stenosis  Family/Caregiver Present: Yes  Prior to Session Communication: Bedside nurse  Patient Position Received: Bed, 3 rail up  Preferred Learning Style: auditory, kinesthetic, verbal, visual  General Comment: Pt cooperative and agreeable with OT evaluation.  Precautions:  Medical Precautions: Fall precautions, Spinal precautions    Vital Sign (Past 2hrs)        Date/Time Vitals Session Patient Position Pulse Resp SpO2 BP MAP (mmHg)    11/23/24 1550 --  --  82  17  98 %  143/82  103                         Pain:  Pain Assessment  Pain Assessment: 0-10  0-10 (Numeric) Pain Score: 6  Pain Type: Surgical pain  Pain Location: Back    Objective   Cognition:  Overall Cognitive Status: Within Functional Limits  Orientation Level: Oriented X4  Safety/Judgement: Within Functional Limits  Impulsive: Mildly  Processing Speed: Within funtional limits           Home Living:  Type of Home: House  Lives With: Spouse  Home Adaptive Equipment: Walker rolling or standard, Cane, Reacher  Home Layout: Two level, Bed/bath upstairs, 1/2 bath on main level, Able to live on main level with bedroom/bathroom  Home Access: Stairs to enter with rails  Entrance Stairs-Rails: Right  Entrance Stairs-Number of Steps: 2  Bathroom Shower/Tub: Tub/shower unit  Bathroom Toilet: Adaptive toilet seating  Bathroom Equipment: Grab bars in shower, Raised toilet seat with rails  Prior Function:  Level of [place]: Independent with ADLs and functional transfers, Independent with homemaking with ambulation  Receives Help From: Family  ADL Assistance: Independent  Homemaking Assistance: Independent (Spouse completes meal prep)  Ambulatory  Assistance: Independent (Cane PRN)  Vocational: Full time employment  Leisure: Pt enjoys exercising at [email or web address]  Hand Dominance: Right  Prior Function Comments: +Driving. Pt denies recent falls  IADL History:  Homemaking Responsibilities: No (Spouse can assist during recovery)  ADL:  Eating Assistance: Independent  Grooming Assistance: Stand by  Grooming Deficit: Setup  UE Dressing Assistance: Stand by  LE Dressing Assistance: Minimal  Toileting Assistance with Device: Stand by  Activity Tolerance:     Bed Mobility/Transfers: Bed Mobility  Bed Mobility: Yes  Bed Mobility 1  Bed Mobility 1: Supine to sitting  Level of Assistance 1: Close supervision  Bed Mobility Comments 1: Proper log roll technique    Transfers  Transfer: Yes  Transfer 1  Transfer From 1: Bed to  Transfer to 1: Stand  Technique 1: Sit to stand, Stand to sit  Transfer Device 1: Walker, Gait belt  Transfer Level of Assistance 1: Contact guard  Transfers 2  Transfer From 2: Stand to  Transfer to 2: Sit, Toilet  Technique 2: Stand to sit  Transfer Device 2: Walker, Gait belt  Transfer Level of Assistance 2: Contact guard  Trials/Comments 2: Verbal cueing and instruction for safe technique. Pt mildly impulsive.      Functional Mobility:  Functional Mobility  Functional Mobility Performed: Yes (Pt performs functional mobility in patient's room with use of ww. Frequent verbal cues to slow down and for proper safety awareness.)  Sitting Balance:  Static Sitting Balance  Static Sitting-Balance Support: Bilateral upper extremity supported  Static Sitting-Level of Assistance: Independent  Dynamic Sitting Balance  Dynamic Sitting-Balance Support: Bilateral upper extremity supported  Dynamic Sitting-Level of Assistance: Independent  Standing Balance:  Static Standing Balance  Static Standing-Balance Support: Bilateral upper extremity supported  Static Standing-Level of Assistance: Contact guard  Dynamic Standing Balance  Dynamic Standing-Balance Support:  Bilateral upper extremity supported  Dynamic Standing-Level of Assistance: Contact guard   Modalities:     Vision:Vision - Basic Assessment  Current Vision: No visual deficits  Sensation:  Light Touch: No apparent deficits (Reported hx numbness/tingling LLE but has improved)  Strength:  Strength Comments: Appears to have good strength  Perception:     Coordination:  Movements are Fluid and Coordinated: Yes   Hand Function:  Gross Grasp: Functional  Coordination: Functional      Outcome Measures:[facility] Daily Activity  Putting on and taking off regular lower body clothing: A little  Bathing (including washing, rinsing, drying): A little  Putting on and taking off regular upper body clothing: None  Toileting, which includes using toilet, bedpan or urinal: A little  Taking care of personal grooming such as brushing teeth: None  Eating Meals: None  Daily Activity - Total Score: 21        Education Documentation  ADL Training, taught by Richa Gómez OT at 11/23/2024  3:54 PM.  Learner: Patient  Readiness: Eager  Method: Explanation, Demonstration  Response: Verbalizes Understanding    Body Mechanics, taught by Richa Gómez OT at 11/23/2024  3:54 PM.  Learner: Patient  Readiness: Eager  Method: Explanation, Demonstration  Response: Verbalizes Understanding    Precautions, taught by Richa Gómez OT at 11/23/2024  3:54 PM.  Learner: Patient  Readiness: Eager  Method: Explanation, Demonstration  Response: Verbalizes Understanding    Education Comments  No comments found.        OP EDUCATION:  Education  Individual(s) Educated: Patient  Education Provided: Anatomy & Physiology  Risk and Benefits Discussed with Patient/Caregiver/Other: yes  Patient/Caregiver Demonstrated Understanding: yes  Plan of Care Discussed and Agreed Upon: yes  Patient Response to Education: Patient/Caregiver Verbalized Understanding of Information    Goals:  Encounter Problems       Encounter Problems (Active)        ADLs       Patient with complete lower body dressing with modified independent level of assistance donning and doffing all LE clothes  with no adaptive equipment while edge of bed        Start:  11/23/24    Expected End:  12/07/24            Patient will complete daily grooming tasks brushing teeth and washing face/hair with modified independent level of assistance and PRN adaptive equipment while standing.       Start:  11/23/24    Expected End:  12/07/24            Patient will complete toileting including hygiene clothing management/hygiene with modified independent level of assistance and raised toilet seat.       Start:  11/23/24    Expected End:  12/07/24               MOBILITY       Patient will perform Functional mobility modified independent level of assistance and least restrictive device in order to improve safety and functional mobility.       Start:  11/23/24    Expected End:  12/07/24               TRANSFERS       Patient will perform bed mobility modified independent level of assistance and bed rails in order to improve safety and independence with mobility       Start:  11/23/24    Expected End:  12/07/24            Patient will complete functional transfer to chair with least restrictive device with modified independent level of assistance.       Start:  11/23/24    Expected End:  12/07/24            Patient will complete sit to stand transfer with modified independent level of assistance and least restrictive device in order to improve safety and prepare for out of bed mobility.       Start:  11/23/24    Expected End:  12/07/24

## 2024-11-23 NOTE — PROGRESS NOTES
Physical Therapy    Physical Therapy Evaluation & Treatment    Patient Name: Chris Monique  MRN: [id number]  Department: [facility] A 7  Room: [facility]  Today's Date: 11/23/2024   Time Calculation  Start Time: 0906  Stop Time: 0940  Time Calculation (min): 34 min    Assessment/Plan   PT Assessment  PT Assessment Results: Decreased safety awareness, Orthopedic restrictions, Pain, Impaired balance, Decreased mobility  Rehab Prognosis: Good  Barriers to Discharge: Pending stair training with steps to access home  Evaluation/Treatment Tolerance: Patient tolerated treatment well  Medical Staff Made Aware: Yes  Strengths: Ability to acquire knowledge, Access to adaptive/assistive products, Premorbid level of function, Support and attitude of living partners  Barriers to Participation:  (None identified)  End of Session Communication: Bedside nurse  Assessment Comment: Pt presents today with fair functional LE strength, balance, and good endurance. Pt would benefit from continued PT to progress balance, gait, and stair training to maximize independence with functional mobility at D/C. At D/C, pt would benefit from low intensity therapy.  End of Session Patient Position: Up in chair   IP OR SWING BED PT PLAN  Inpatient or Swing Bed: Inpatient  PT Plan  Treatment/Interventions: Bed mobility, Transfer training, Gait training, Stair training, Balance training, Therapeutic activity, Positioning, Postural re-education  PT Plan: Ongoing PT  PT Frequency: Daily  PT Discharge Recommendations: Low intensity level of continued care  Equipment Recommended upon Discharge:  (Pt owns a FWW)  PT Recommended Transfer Status: Stand by assist, Assistive device  PT - OK to Discharge: Yes (PT POC initiated today)    Subjective     General Visit Information:  General  Reason for Referral: 75 y/o M s/p L2-L3, L4-L5 lumbar spine laminectomy and fusion  Referred By: NUSRAT Jo  Past Medical History Relevant to Rehab:   Past Medical History:   Diagnosis Date     Aortic atherosclerosis (CMS-HCC)     without abdominal aortic aneurysm, vascular US 5/3/23    Cervical spondylosis with myelopathy     HLD (hyperlipidemia)     HTN (hypertension)     Low back pain     Lumbar stenosis with neurogenic claudication     Spinal cord compression (Multi)      Family/Caregiver Present: No  Prior to Session Communication: Bedside nurse  Patient Position Received: Bed, 3 rail up  Preferred Learning Style: auditory, kinesthetic, verbal, visual  General Comment: Pt supine in bed upon PT arrival. Cleared to participate with RN, and agreeable to PT evaluation.  Home Living:  Home Living  Type of Home: House  Lives With: Spouse  Home Adaptive Equipment: Walker rolling or standard, Cane, Reacher (FWW)  Home Layout: Two level, Bed/bath upstairs, 1/2 bath on main level, Able to live on main level with bedroom/bathroom  Home Access: Stairs to enter with rails  Entrance Stairs-Rails: Right  Entrance Stairs-Number of Steps: 2  Bathroom Shower/Tub: Tub/shower unit  Bathroom Toilet: Adaptive toilet seating  Bathroom Equipment: Grab bars in shower, Raised toilet seat with rails (Shower seat)  Home Living Comments: Pt has recliner chair on the first and second levels of home  Prior Level of Function:  Prior Function Per Pt/Caregiver Report  Level of [place]: Independent with ADLs and functional transfers, Independent with homemaking with ambulation  Receives Help From: Family  ADL Assistance: Independent  Homemaking Assistance: Independent (Pt reports wife is primarily completes meal prep)  Ambulatory Assistance: Independent (Cane PRN)  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 Pt denies recent falls  Precautions:  Precautions  Hearing/Visual Limitations: +Glasses  Medical Precautions: Fall precautions, Spinal precautions  Post-Surgical Precautions: Spinal  precautions    Objective   Pain:  Pain Assessment  Pain Assessment: 0-10  0-10 (Numeric) Pain Score: 8  Pain Type: Surgical pain  Pain Location: Back  Pain Orientation: Lower  Pain Interventions: Ambulation/increased activity, Repositioned  Response to Interventions: No change in pain (RN notified of pt pain rating)  Cognition:  Cognition  Overall Cognitive Status: Within Functional Limits  Orientation Level: Oriented X4  Impulsive: Within functional limits    General Assessments:  Activity Tolerance  Endurance: Endurance does not limit participation in activity    Sensation  Light Touch: No apparent deficits    Coordination  Movements are Fluid and Coordinated: Yes    Postural Control  Head Control: Mild forward head posture in standing  Posture Comment: Increased hip/trunk flexion in standing. VC/TC  provided for correction.    Static Sitting Balance  Static Sitting-Balance Support: Bilateral upper extremity supported, Feet supported  Static Sitting-Level of Assistance: Close supervision  Static Sitting-Comment/Number of Minutes: At EOB  Dynamic Sitting Balance  Dynamic Sitting-Balance Support: Bilateral upper extremity supported, Feet supported  Dynamic Sitting-Level of Assistance: Contact guard    Static Standing Balance  Static Standing-Balance Support: Bilateral upper extremity supported  Static Standing-Level of Assistance: Close supervision  Static Standing-Comment/Number of Minutes: +Gait belt. With FWW support  Dynamic Standing Balance  Dynamic Standing-Balance Support: Bilateral upper extremity supported  Dynamic Standing-Level of Assistance: Close supervision  Dynamic Standing-Balance: Turning  Dynamic Standing-Comments: +Gait belt. With FWW support  Functional Assessments:  Bed Mobility  Bed Mobility: Yes  Bed Mobility 1  Bed Mobility 1: Supine to sitting, Log roll  Level of Assistance 1: Close supervision, Minimal verbal cues  Bed Mobility Comments 1: VC for log roll sequence to the left. HOB nearly  flat.    Transfers  Transfer: Yes  Transfer 1  Transfer From 1: Bed to  Transfer to 1: Stand  Technique 1: Sit to stand  Transfer Device 1: Walker, Gait belt  Transfer Level of Assistance 1: Close supervision, Minimal verbal cues  Trials/Comments 1: VC for BUE push from the bed to stand instead of pulling from the FWW. Pt demonstrates unilateral UE push from bed with contralateral UE resting on FWW.  Transfers 2  Transfer From 2: Stand to  Transfer to 2: Chair with arms  Technique 2: Stand to sit  Transfer Device 2: Walker, Gait belt  Transfer Level of Assistance 2: Close supervision, Minimal verbal cues  Trials/Comments 2: VC for BLE positioning against the chair before attempting to sit. VC for BUE reach for the chair when sitting. Pt demonstrates unilateral reach for the armrest with contralateral UE resting on FWW.    Stairs  Stairs: No  Extremity/Trunk Assessments:  RLE   RLE : Exceptions to WFL  Strength RLE  RLE Overall Strength: Greater than or equal to 3/5 as evidenced by functional mobility  LLE   LLE : Exceptions to WFL  Strength LLE  LLE Overall Strength: Greater than or equal to 3/5 as evidenced by functional mobility  Treatments:  Ambulation/Gait Training  Ambulation/Gait Training Performed: Yes  Ambulation/Gait Training 1  Surface 1: Level tile  Device 1: Rolling walker  Gait Support Devices: Gait belt  Assistance 1: Close supervision, Minimal verbal cues, Minimal tactile cues  Quality of Gait 1: Wide base of support, Forward flexed posture  Comments/Distance (ft) 1: >500 ft with a step-through pattern. Pt demonstrates fair alisa, step length, and heel strike. Pt also demonstrates increased flexion at the hips. Occasional VC/TC required to correct posture.VC for increased weight bearing through FWW and proper spacing between pt and FWW to encourage improved posture.  Outcome Measures:  [facility] Basic Mobility  Turning from your back to your side while in a flat bed without using bedrails: A  little  Moving from lying on your back to sitting on the side of a flat bed without using bedrails: A little  Moving to and from bed to chair (including a wheelchair): A little  Standing up from a chair using your arms (e.g. wheelchair or bedside chair): A little  To walk in hospital room: A little  Climbing 3-5 steps with railing: A lot  Basic Mobility - Total Score: 17    Encounter Problems       Encounter Problems (Active)       Balance       Goal 1 (Progressing)       Start:  11/23/24    Expected End:  12/07/24       Pt performs all sitting balance IND and standing balance mod IND using LRAD            Mobility       STG - Patient will navigate 4-6 steps with Single HR support mod IND (Not Progressing)       Start:  11/23/24    Expected End:  12/07/24            STG - Patient will ambulate (Progressing)       Start:  11/23/24    Expected End:  12/07/24       >500 ft mod IND using LRAD with good posture and proper gait mechanics            PT Transfers       STG - Patient to transfer to and from sit to supine (Progressing)       Start:  11/23/24    Expected End:  12/07/24       Mod IND using the log roll technique         STG - Patient will transfer sit to and from stand (Progressing)       Start:  11/23/24    Expected End:  12/07/24       Mod IND using LRAD              Education Documentation  Handouts, taught by Phill Perez PT at 11/23/2024 11:36 AM.  Learner: Patient  Readiness: Acceptance  Method: Explanation, Demonstration, Handout  Response: Verbalizes Understanding    Precautions, taught by Phill Perez PT at 11/23/2024 11:36 AM.  Learner: Patient  Readiness: Acceptance  Method: Explanation, Demonstration, Handout  Response: Verbalizes Understanding    Body Mechanics, taught by Phill Perez PT at 11/23/2024 11:36 AM.  Learner: Patient  Readiness: Acceptance  Method: Explanation, Demonstration, Handout  Response: Verbalizes Understanding    Home Exercise Program, taught by Phill Perez  PT at 11/23/2024 11:36 AM.  Learner: Patient  Readiness: Acceptance  Method: Explanation, Demonstration, Handout  Response: Verbalizes Understanding    Mobility Training, taught by Phill Perez, PT at 11/23/2024 11:36 AM.  Learner: Patient  Readiness: Acceptance  Method: Explanation, Demonstration, Handout  Response: Verbalizes Understanding    Education Comments  No comments found.

## 2024-11-23 NOTE — PROGRESS NOTES
Doing well today  Preop leg symptoms dramatically improved  Has been up and walking and feels very good    dressing c/d/i  5/5 strength b/l lower ext  SILT L2-S1    HV drain 140/280 last 2 shifts    Plan:  Maintain drain  OOB ad frances  PT/OT  Anticipate dc monday

## 2024-11-24 ENCOUNTER — HOME HEALTH ADMISSION (OUTPATIENT)
Dept: HOME HEALTH SERVICES | Facility: HOME HEALTH | Age: 74
End: 2024-11-24
Payer: COMMERCIAL

## 2024-11-24 ENCOUNTER — DOCUMENTATION (OUTPATIENT)
Dept: HOME HEALTH SERVICES | Facility: HOME HEALTH | Age: 74
End: 2024-11-24
Payer: COMMERCIAL

## 2024-11-24 VITALS
RESPIRATION RATE: 18 BRPM | DIASTOLIC BLOOD PRESSURE: 83 MMHG | HEART RATE: 76 BPM | SYSTOLIC BLOOD PRESSURE: 152 MMHG | WEIGHT: 214.51 LBS | OXYGEN SATURATION: 97 % | TEMPERATURE: 98 F | BODY MASS INDEX: 30.71 KG/M2 | HEIGHT: 70 IN

## 2024-11-24 PROCEDURE — 2500000002 HC RX 250 W HCPCS SELF ADMINISTERED DRUGS (ALT 637 FOR MEDICARE OP, ALT 636 FOR OP/ED): Performed by: PHYSICIAN ASSISTANT

## 2024-11-24 PROCEDURE — 2500000001 HC RX 250 WO HCPCS SELF ADMINISTERED DRUGS (ALT 637 FOR MEDICARE OP): Performed by: PHYSICIAN ASSISTANT

## 2024-11-24 PROCEDURE — 7100000011 HC EXTENDED STAY RECOVERY HOURLY - NURSING UNIT

## 2024-11-24 PROCEDURE — 2500000004 HC RX 250 GENERAL PHARMACY W/ HCPCS (ALT 636 FOR OP/ED): Performed by: PHYSICIAN ASSISTANT

## 2024-11-24 ASSESSMENT — COGNITIVE AND FUNCTIONAL STATUS - GENERAL
MOVING TO AND FROM BED TO CHAIR: A LITTLE
DRESSING REGULAR LOWER BODY CLOTHING: A LITTLE
MOBILITY SCORE: 19
TURNING FROM BACK TO SIDE WHILE IN FLAT BAD: A LITTLE
CLIMB 3 TO 5 STEPS WITH RAILING: A LITTLE
HELP NEEDED FOR BATHING: A LITTLE
DAILY ACTIVITIY SCORE: 21
WALKING IN HOSPITAL ROOM: A LITTLE
DRESSING REGULAR UPPER BODY CLOTHING: A LITTLE
STANDING UP FROM CHAIR USING ARMS: A LITTLE

## 2024-11-24 ASSESSMENT — PAIN DESCRIPTION - LOCATION: LOCATION: BACK

## 2024-11-24 ASSESSMENT — PAIN SCALES - GENERAL: PAINLEVEL_OUTOF10: 8

## 2024-11-24 ASSESSMENT — PAIN DESCRIPTION - ORIENTATION: ORIENTATION: LOWER

## 2024-11-24 NOTE — CARE PLAN
The patient's goals for the shift include      The clinical goals for the shift include patient to remain safe throught the shift    Problem: Pain - Adult  Goal: Verbalizes/displays adequate comfort level or baseline comfort level  Outcome: Progressing     Problem: Safety - Adult  Goal: Free from fall injury  Outcome: Progressing     Problem: Discharge Planning  Goal: Discharge to home or other facility with appropriate resources  Outcome: Progressing     Problem: Chronic Conditions and Co-morbidities  Goal: Patient's chronic conditions and co-morbidity symptoms are monitored and maintained or improved  Outcome: Progressing

## 2024-11-24 NOTE — PROGRESS NOTES
11/24/24 1355   Discharge Planning   Who is requesting discharge planning? Provider   Home or Post Acute Services In home services   Type of Home Care Services Home PT;Home OT   Expected Discharge Disposition Home H   Does the patient need discharge transport arranged? No     11/24/24 1355  Patient discharge home.  [facility]  [facility]  [facility]  [facility] and [facility]S sent to [facility].  Marizol Barfield RN [facility]

## 2024-11-24 NOTE — DISCHARGE SUMMARY
Discharge Diagnosis  Lumbar radiculopathy    Issues Requiring Follow-Up  Lumbar spine surgery    Test Results Pending At Discharge  Pending Labs       No current pending labs.            Hospital Course   unremarkable    Pertinent Physical Exam At Time of Discharge  Baseline neuro exam    Home Medications     Medication List      START taking these medications     acetaminophen 500 mg tablet; Commonly known as: Tylenol Extra Strength;   Take 2 tablets (1,000 mg) by mouth every 8 hours for 14 days.   methocarbamol 500 mg tablet; Commonly known as: Robaxin; Take 1-2 tabs   every 8 hours as needed for spasms   oxyCODONE 5 mg immediate release tablet; Commonly known as: Roxicodone;   Take 1 tablet (5 mg) by mouth every 4 hours if needed for severe pain (7 -   10) for up to 7 days.   polyethylene glycol 17 gram/dose powder; Commonly known as: Miralax; Mix   17 g of powder and drink 2 times a day as needed (constipation).     CONTINUE taking these medications     amLODIPine 5 mg tablet; Commonly known as: Norvasc   ascorbic acid 1,000 mg tablet; Commonly known as: Vitamin C   chlorpheniramine 4 mg tablet; Commonly known as: Chlor-Trimeton   cyanocobalamin 500 mcg tablet; Commonly known as: Vitamin B-12   docusate sodium 100 mg capsule; Commonly known as: Colace   gabapentin 100 mg capsule; Commonly known as: Neurontin; Take 2 capsules   (200 mg) by mouth 2 times a day.   losartan-hydrochlorothiazide 50-12.5 mg tablet; Commonly known as:   Hyzaar   multivitamin tablet   tamsulosin 0.4 mg 24 hr capsule; Commonly known as: Flomax   Vitamin D3 25 MCG (1000 UT) tablet; Generic drug: cholecalciferol   vitamin E 450 mg (1000 unit) capsule     STOP taking these medications     baclofen 10 mg tablet; Commonly known as: Lioresal   chlorhexidine 0.12 % solution; Commonly known as: Peridex   DUAL ACTION PAIN RELIEVER ORAL       Outpatient Follow-Up  Future Appointments   Date Time Provider Department Center   12/11/2024 11:30 AM  Lexi Barakat PA-C [id number] [facility]       Juan Jose Jo MD

## 2024-11-24 NOTE — NURSING NOTE
Patient pulled hemovac drain out last night when getting back in bed. This nurse believes pt is turning of bed alarm. Patient was educated on the importance of using call light when wanting to get up from bed. Site assessed for signs of bleeding and infection. No active bleeding noted. Md aware.

## 2024-11-25 ASSESSMENT — PAIN SCALES - GENERAL: PAINLEVEL_OUTOF10: 4

## 2024-11-26 ENCOUNTER — HOME HEALTH ADMISSION (OUTPATIENT)
Dept: HOME HEALTH SERVICES | Facility: HOME HEALTH | Age: 74
End: 2024-11-26
Payer: COMMERCIAL

## 2024-11-26 ENCOUNTER — DOCUMENTATION (OUTPATIENT)
Dept: HOME HEALTH SERVICES | Facility: HOME HEALTH | Age: 74
End: 2024-11-26
Payer: COMMERCIAL

## 2024-11-26 ENCOUNTER — TELEPHONE (OUTPATIENT)
Dept: ORTHOPEDIC SURGERY | Facility: HOSPITAL | Age: 74
End: 2024-11-26
Payer: COMMERCIAL

## 2024-11-26 ENCOUNTER — TELEPHONE (OUTPATIENT)
Dept: HOME HEALTH SERVICES | Facility: HOME HEALTH | Age: 74
End: 2024-11-26

## 2024-11-26 DIAGNOSIS — M48.062 LUMBAR STENOSIS WITH NEUROGENIC CLAUDICATION: ICD-10-CM

## 2024-11-26 DIAGNOSIS — M48.062 SPINAL STENOSIS OF LUMBAR REGION WITH NEUROGENIC CLAUDICATION: ICD-10-CM

## 2024-11-26 NOTE — PROGRESS NOTES
11/26/24 1056   Discharge Planning   Who is requesting discharge planning? Provider   Home or Post Acute Services In home services   Type of Home Care Services Home OT;Home PT   Expected Discharge Disposition Home H   Does the patient need discharge transport arranged? No   Intensity of Service   Intensity of Service >30 min     11/26/24 1057  Received message for [facility] regarding patient's home care and when it will start.  [facility] unable to accept patient in a timely manner.  [facility] did accept as of 11/24/24.  Spoke with Margaret at [facility], they will be reaching ou to patient today to arrange first visit.  Patient updated via phone.  Marizol Barfield RN [facility]

## 2024-11-26 NOTE — TELEPHONE ENCOUNTER
Patient wants to know if he can take methocarbamol every 4 hours instead of 8 hours.   He is currently taking oxycodone every 4 and tylenol every 4.   He is also taking gabapentin at bedtime.

## 2024-11-26 NOTE — TELEPHONE ENCOUNTER
This referral has been made a Non Admit with  Home Care due to Patient is Active with Another Home Care Agency Patient set up with [facility] on discharge. If you have further questions, feel free to reach out to our office at [phone]. Thank you, [facility] Intake.

## 2024-11-29 RX ORDER — OXYCODONE HYDROCHLORIDE 5 MG/1
5 TABLET ORAL EVERY 4 HOURS PRN
Qty: 42 TABLET | Refills: 0 | Status: SHIPPED | OUTPATIENT
Start: 2024-11-29 | End: 2024-12-06

## 2024-12-03 ENCOUNTER — TELEPHONE (OUTPATIENT)
Dept: ORTHOPEDIC SURGERY | Facility: HOSPITAL | Age: 74
End: 2024-12-03
Payer: COMMERCIAL

## 2024-12-03 DIAGNOSIS — M48.062 SPINAL STENOSIS OF LUMBAR REGION WITH NEUROGENIC CLAUDICATION: ICD-10-CM

## 2024-12-03 NOTE — TELEPHONE ENCOUNTER
Unfortunately his last refill was only 4 days ago, even taking 2 tabs every 8 hours he should not be out of medication.  The prescription was for 1 tablet every 4 hours, the earliest I can refill the prescription would be Thursday, 12/5.

## 2024-12-03 NOTE — TELEPHONE ENCOUNTER
Patient wife called stating that he is currently taking 2 tablets every 8 hours as needed for pain. He is almost out and need a new RX. Can you refill the oxycodone for him again?

## 2024-12-04 RX ORDER — OXYCODONE HYDROCHLORIDE 5 MG/1
5 TABLET ORAL EVERY 4 HOURS PRN
Qty: 42 TABLET | Refills: 0 | Status: ON HOLD | OUTPATIENT
Start: 2024-12-05 | End: 2024-12-12

## 2024-12-05 DIAGNOSIS — M48.062 SPINAL STENOSIS OF LUMBAR REGION WITH NEUROGENIC CLAUDICATION: ICD-10-CM

## 2024-12-05 RX ORDER — METHOCARBAMOL 500 MG/1
TABLET, FILM COATED ORAL
Qty: 60 TABLET | Refills: 2 | Status: ON HOLD | OUTPATIENT
Start: 2024-12-05

## 2024-12-06 ENCOUNTER — APPOINTMENT (OUTPATIENT)
Dept: RADIOLOGY | Facility: HOSPITAL | Age: 74
End: 2024-12-06
Payer: COMMERCIAL

## 2024-12-06 ENCOUNTER — HOSPITAL ENCOUNTER (INPATIENT)
Facility: HOSPITAL | Age: 74
End: 2024-12-06
Attending: EMERGENCY MEDICINE | Admitting: HOSPITALIST
Payer: COMMERCIAL

## 2024-12-06 DIAGNOSIS — G89.29 CHRONIC BILATERAL LOW BACK PAIN WITHOUT SCIATICA: ICD-10-CM

## 2024-12-06 DIAGNOSIS — G89.29 CHRONIC PAIN OF BOTH KNEES: ICD-10-CM

## 2024-12-06 DIAGNOSIS — M25.561 CHRONIC PAIN OF BOTH KNEES: ICD-10-CM

## 2024-12-06 DIAGNOSIS — R60.0 EDEMA OF LEFT LOWER EXTREMITY: ICD-10-CM

## 2024-12-06 DIAGNOSIS — M48.062 LUMBAR STENOSIS WITH NEUROGENIC CLAUDICATION: ICD-10-CM

## 2024-12-06 DIAGNOSIS — M54.50 CHRONIC BILATERAL LOW BACK PAIN WITHOUT SCIATICA: ICD-10-CM

## 2024-12-06 DIAGNOSIS — M48.062 SPINAL STENOSIS OF LUMBAR REGION WITH NEUROGENIC CLAUDICATION: ICD-10-CM

## 2024-12-06 DIAGNOSIS — M25.562 CHRONIC PAIN OF BOTH KNEES: ICD-10-CM

## 2024-12-06 DIAGNOSIS — R26.2 DIFFICULTY IN WALKING: Primary | ICD-10-CM

## 2024-12-06 DIAGNOSIS — M47.12 CERVICAL SPONDYLOSIS WITH MYELOPATHY: ICD-10-CM

## 2024-12-06 LAB
ALBUMIN SERPL BCP-MCNC: 3.9 G/DL (ref 3.4–5)
ALP SERPL-CCNC: 116 U/L (ref 33–136)
ALT SERPL W P-5'-P-CCNC: 17 U/L (ref 10–52)
ANION GAP SERPL CALC-SCNC: 11 MMOL/L (ref 10–20)
APPEARANCE UR: CLEAR
AST SERPL W P-5'-P-CCNC: 15 U/L (ref 9–39)
BASOPHILS # BLD AUTO: 0.04 X10*3/UL (ref 0–0.1)
BASOPHILS NFR BLD AUTO: 0.4 %
BILIRUB SERPL-MCNC: 0.4 MG/DL (ref 0–1.2)
BILIRUB UR STRIP.AUTO-MCNC: NEGATIVE MG/DL
BUN SERPL-MCNC: 8 MG/DL (ref 6–23)
CALCIUM SERPL-MCNC: 9.4 MG/DL (ref 8.6–10.3)
CHLORIDE SERPL-SCNC: 98 MMOL/L (ref 98–107)
CO2 SERPL-SCNC: 31 MMOL/L (ref 21–32)
COLOR UR: COLORLESS
CREAT SERPL-MCNC: 0.5 MG/DL (ref 0.5–1.3)
CRP SERPL-MCNC: 1.54 MG/DL
EGFRCR SERPLBLD CKD-EPI 2021: >90 ML/MIN/1.73M*2
EOSINOPHIL # BLD AUTO: 0.09 X10*3/UL (ref 0–0.4)
EOSINOPHIL NFR BLD AUTO: 1 %
ERYTHROCYTE [DISTWIDTH] IN BLOOD BY AUTOMATED COUNT: 13.1 % (ref 11.5–14.5)
ERYTHROCYTE [SEDIMENTATION RATE] IN BLOOD BY WESTERGREN METHOD: 65 MM/H (ref 0–20)
GLUCOSE SERPL-MCNC: 105 MG/DL (ref 74–99)
GLUCOSE UR STRIP.AUTO-MCNC: NORMAL MG/DL
HCT VFR BLD AUTO: 38.3 % (ref 41–52)
HGB BLD-MCNC: 12.7 G/DL (ref 13.5–17.5)
IMM GRANULOCYTES # BLD AUTO: 0.04 X10*3/UL (ref 0–0.5)
IMM GRANULOCYTES NFR BLD AUTO: 0.4 % (ref 0–0.9)
KETONES UR STRIP.AUTO-MCNC: NEGATIVE MG/DL
LEUKOCYTE ESTERASE UR QL STRIP.AUTO: NEGATIVE
LYMPHOCYTES # BLD AUTO: 1.18 X10*3/UL (ref 0.8–3)
LYMPHOCYTES NFR BLD AUTO: 12.8 %
MCH RBC QN AUTO: 30.1 PG (ref 26–34)
MCHC RBC AUTO-ENTMCNC: 33.2 G/DL (ref 32–36)
MCV RBC AUTO: 91 FL (ref 80–100)
MONOCYTES # BLD AUTO: 0.28 X10*3/UL (ref 0.05–0.8)
MONOCYTES NFR BLD AUTO: 3 %
NEUTROPHILS # BLD AUTO: 7.6 X10*3/UL (ref 1.6–5.5)
NEUTROPHILS NFR BLD AUTO: 82.4 %
NITRITE UR QL STRIP.AUTO: NEGATIVE
NRBC BLD-RTO: 0 /100 WBCS (ref 0–0)
PH UR STRIP.AUTO: 7.5 [PH]
PLATELET # BLD AUTO: 514 X10*3/UL (ref 150–450)
POTASSIUM SERPL-SCNC: 3.6 MMOL/L (ref 3.5–5.3)
PROT SERPL-MCNC: 7.2 G/DL (ref 6.4–8.2)
PROT UR STRIP.AUTO-MCNC: NEGATIVE MG/DL
RBC # BLD AUTO: 4.22 X10*6/UL (ref 4.5–5.9)
RBC # UR STRIP.AUTO: NEGATIVE /UL
SODIUM SERPL-SCNC: 136 MMOL/L (ref 136–145)
SP GR UR STRIP.AUTO: 1.01
UROBILINOGEN UR STRIP.AUTO-MCNC: NORMAL MG/DL
WBC # BLD AUTO: 9.2 X10*3/UL (ref 4.4–11.3)

## 2024-12-06 PROCEDURE — 2500000001 HC RX 250 WO HCPCS SELF ADMINISTERED DRUGS (ALT 637 FOR MEDICARE OP): Performed by: EMERGENCY MEDICINE

## 2024-12-06 PROCEDURE — 99222 1ST HOSP IP/OBS MODERATE 55: CPT | Performed by: HOSPITALIST

## 2024-12-06 PROCEDURE — 72132 CT LUMBAR SPINE W/DYE: CPT

## 2024-12-06 PROCEDURE — 2550000001 HC RX 255 CONTRASTS: Performed by: EMERGENCY MEDICINE

## 2024-12-06 PROCEDURE — 71045 X-RAY EXAM CHEST 1 VIEW: CPT

## 2024-12-06 PROCEDURE — 72132 CT LUMBAR SPINE W/DYE: CPT | Mod: FOREIGN READ | Performed by: RADIOLOGY

## 2024-12-06 PROCEDURE — 86140 C-REACTIVE PROTEIN: CPT | Performed by: EMERGENCY MEDICINE

## 2024-12-06 PROCEDURE — 1210000001 HC SEMI-PRIVATE ROOM DAILY

## 2024-12-06 PROCEDURE — 36415 COLL VENOUS BLD VENIPUNCTURE: CPT | Performed by: EMERGENCY MEDICINE

## 2024-12-06 PROCEDURE — 71045 X-RAY EXAM CHEST 1 VIEW: CPT | Mod: FOREIGN READ | Performed by: RADIOLOGY

## 2024-12-06 PROCEDURE — 85652 RBC SED RATE AUTOMATED: CPT | Performed by: EMERGENCY MEDICINE

## 2024-12-06 PROCEDURE — 81003 URINALYSIS AUTO W/O SCOPE: CPT | Performed by: EMERGENCY MEDICINE

## 2024-12-06 PROCEDURE — 99285 EMERGENCY DEPT VISIT HI MDM: CPT | Performed by: EMERGENCY MEDICINE

## 2024-12-06 PROCEDURE — 2500000002 HC RX 250 W HCPCS SELF ADMINISTERED DRUGS (ALT 637 FOR MEDICARE OP, ALT 636 FOR OP/ED): Performed by: EMERGENCY MEDICINE

## 2024-12-06 PROCEDURE — 84075 ASSAY ALKALINE PHOSPHATASE: CPT | Performed by: EMERGENCY MEDICINE

## 2024-12-06 PROCEDURE — 85025 COMPLETE CBC W/AUTO DIFF WBC: CPT | Performed by: EMERGENCY MEDICINE

## 2024-12-06 RX ORDER — TAMSULOSIN HYDROCHLORIDE 0.4 MG/1
0.4 CAPSULE ORAL ONCE
Status: COMPLETED | OUTPATIENT
Start: 2024-12-06 | End: 2024-12-06

## 2024-12-06 RX ORDER — BACLOFEN 10 MG/1
20 TABLET ORAL ONCE
Status: COMPLETED | OUTPATIENT
Start: 2024-12-06 | End: 2024-12-06

## 2024-12-06 RX ORDER — OXYCODONE AND ACETAMINOPHEN 5; 325 MG/1; MG/1
2 TABLET ORAL ONCE
Status: COMPLETED | OUTPATIENT
Start: 2024-12-06 | End: 2024-12-06

## 2024-12-06 RX ORDER — OXYCODONE AND ACETAMINOPHEN 5; 325 MG/1; MG/1
1 TABLET ORAL ONCE
Status: COMPLETED | OUTPATIENT
Start: 2024-12-06 | End: 2024-12-06

## 2024-12-06 RX ADMIN — OXYCODONE HYDROCHLORIDE AND ACETAMINOPHEN 2 TABLET: 5; 325 TABLET ORAL at 18:13

## 2024-12-06 RX ADMIN — TAMSULOSIN HYDROCHLORIDE 0.4 MG: 0.4 CAPSULE ORAL at 20:58

## 2024-12-06 RX ADMIN — OXYCODONE HYDROCHLORIDE AND ACETAMINOPHEN 1 TABLET: 5; 325 TABLET ORAL at 20:58

## 2024-12-06 RX ADMIN — IOHEXOL 75 ML: 350 INJECTION, SOLUTION INTRAVENOUS at 17:29

## 2024-12-06 RX ADMIN — BACLOFEN 20 MG: 10 TABLET ORAL at 20:58

## 2024-12-06 ASSESSMENT — PAIN DESCRIPTION - LOCATION
LOCATION: BACK
LOCATION: BACK

## 2024-12-06 ASSESSMENT — PAIN - FUNCTIONAL ASSESSMENT: PAIN_FUNCTIONAL_ASSESSMENT: 0-10

## 2024-12-06 ASSESSMENT — PAIN SCALES - GENERAL
PAINLEVEL_OUTOF10: 8
PAINLEVEL_OUTOF10: 9
PAINLEVEL_OUTOF10: 4

## 2024-12-06 NOTE — ED PROVIDER NOTES
HPI   No chief complaint on file.      Chief complaint: Fall    History of present illness: Patient is a 74-year-old male with history of recent back surgery presented emergency department with complaints of fall and weakness.  According to the patient, over the past several days he has been feeling increasingly weak.  Brain to the patient, usually uses a walker to ambulate.  Patient states that he has had no fever during this period of time he denies any shortness of breath cough or dysuria.  The patient states that approximately 24 hours ago he slid out of the bed.  The patient states that since then he has been having worsening pain in his lower back.  The patient admits that he feels generalized weakness however he denies any malaise.  Concern, the patient presents to the emergency department for further evaluation he has no other complaints at this time.      History provided by:  Patient and relative   used: No            Patient History   Past Medical History:   Diagnosis Date    Aortic atherosclerosis (CMS-HCC)     without abdominal aortic aneurysm, vascular US 5/3/23    Cervical spondylosis with myelopathy     HLD (hyperlipidemia)     HTN (hypertension)     Low back pain     Lumbar stenosis with neurogenic claudication     Spinal cord compression (Multi)      Past Surgical History:   Procedure Laterality Date    CERVICAL LAMINECTOMY      C3-C7 04/19/24    COLONOSCOPY      HERNIA REPAIR      SHOULDER OPEN ROTATOR CUFF REPAIR Left     TOTAL KNEE ARTHROPLASTY Bilateral      Family History   Problem Relation Name Age of Onset    Dementia Mother      Prostate cancer Father      Hydrocephalus Sister      Dementia Brother      Hydrocephalus Brother      Kidney disease Brother       Social History     Tobacco Use    Smoking status: Former     Current packs/day: 0.00     Average packs/day: 0.3 packs/day for 5.0 years (1.3 ttl pk-yrs)     Types: Cigarettes     Start date: 1972     Quit date: 1977      Years since quittin.9    Smokeless tobacco: Never   Vaping Use    Vaping status: Never Used   Substance Use Topics    Alcohol use: Yes     Alcohol/week: 3.0 standard drinks of alcohol     Types: 3 Glasses of wine per week    Drug use: Never       Physical Exam   ED Triage Vitals   Temp Pulse Resp BP   -- -- -- --      SpO2 Temp src Heart Rate Source Patient Position   -- -- -- --      BP Location FiO2 (%)     -- --       Physical Exam  Vitals and nursing note reviewed.   Constitutional:       General: He is not in acute distress.     Appearance: He is well-developed.   HENT:      Head: Normocephalic and atraumatic.   Eyes:      Conjunctiva/sclera: Conjunctivae normal.   Cardiovascular:      Rate and Rhythm: Normal rate and regular rhythm.      Heart sounds: No murmur heard.  Pulmonary:      Effort: Pulmonary effort is normal. No respiratory distress.      Breath sounds: Normal breath sounds.   Abdominal:      Palpations: Abdomen is soft.      Tenderness: There is no abdominal tenderness.   Musculoskeletal:         General: No swelling.      Cervical back: Neck supple.   Skin:     General: Skin is warm and dry.      Capillary Refill: Capillary refill takes less than 2 seconds.      Comments: Patient has a well-healing wound of the lumbar area.  There is moderate amount of nonpurulent discharge from this.   Neurological:      Mental Status: He is alert.   Psychiatric:         Mood and Affect: Mood normal.           ED Course & MDM   Diagnoses as of 24 1320   Difficulty in walking                 No data recorded                                 Medical Decision Making  Medical Decision Making: Patient remained stable during his time in the emergency department.  CBC demonstrated no significant abnormalities Chem-7 and LFTs were all within normal limits.  C-reactive protein was 1.54 with the patient sed rate was 65 urinalysis demonstrated no significant abnormalities.  Chest x-ray demonstrated atelectasis  but no other significant abnormalities finally, the patient's CAT scan of the patient's lumbar spine with IV contrast demonstrated status post laminectomy changes but no other significant abnormalities.    Patient presents to the emergency department with complaints of weakness and back pain.  Initially, had 2 concerns for this patient first, the fall I performed a CAT scan of the patient's lumbar spine to verify no fracture had occurred during the patient's fall.  This was done.  My next concern was the patient's generalized weakness initially suspected the patient was either having an infection or dehydration however, the patient's workup is relatively unremarkable.    We attempted to ambulate the patient in the emergency department however, this was exceedingly difficult for the patient and the patient and family requested the patient be placed into rehab given his generalized weakness I no objections to this.  I spoke with the hospitalist who agreed the patient could be admitted to their service for further evaluation of therapy.  The patient was then admitted to the hospital in otherwise stable condition.    Amount and/or Complexity of Data Reviewed  Labs: ordered. Decision-making details documented in ED Course.  Radiology: ordered. Decision-making details documented in ED Course.        Procedure  Procedures     Lloyd Kay MD  12/07/24 1434

## 2024-12-07 ENCOUNTER — APPOINTMENT (OUTPATIENT)
Dept: RADIOLOGY | Facility: HOSPITAL | Age: 74
End: 2024-12-07
Payer: COMMERCIAL

## 2024-12-07 LAB — HOLD SPECIMEN: NORMAL

## 2024-12-07 PROCEDURE — 2500000001 HC RX 250 WO HCPCS SELF ADMINISTERED DRUGS (ALT 637 FOR MEDICARE OP): Performed by: HOSPITALIST

## 2024-12-07 PROCEDURE — 1100000001 HC PRIVATE ROOM DAILY

## 2024-12-07 PROCEDURE — 93971 EXTREMITY STUDY: CPT

## 2024-12-07 PROCEDURE — G0378 HOSPITAL OBSERVATION PER HR: HCPCS

## 2024-12-07 PROCEDURE — 2500000002 HC RX 250 W HCPCS SELF ADMINISTERED DRUGS (ALT 637 FOR MEDICARE OP, ALT 636 FOR OP/ED): Performed by: HOSPITALIST

## 2024-12-07 PROCEDURE — 2500000004 HC RX 250 GENERAL PHARMACY W/ HCPCS (ALT 636 FOR OP/ED): Performed by: HOSPITALIST

## 2024-12-07 PROCEDURE — 93971 EXTREMITY STUDY: CPT | Performed by: RADIOLOGY

## 2024-12-07 PROCEDURE — 99232 SBSQ HOSP IP/OBS MODERATE 35: CPT | Performed by: INTERNAL MEDICINE

## 2024-12-07 RX ORDER — OXYCODONE HYDROCHLORIDE 5 MG/1
5 TABLET ORAL EVERY 4 HOURS PRN
Status: DISPENSED | OUTPATIENT
Start: 2024-12-07

## 2024-12-07 RX ORDER — ACETAMINOPHEN 325 MG/1
650 TABLET ORAL EVERY 4 HOURS PRN
Status: DISPENSED | OUTPATIENT
Start: 2024-12-07

## 2024-12-07 RX ORDER — METHOCARBAMOL 500 MG/1
500 TABLET, FILM COATED ORAL EVERY 8 HOURS PRN
Status: ACTIVE | OUTPATIENT
Start: 2024-12-07

## 2024-12-07 RX ORDER — GABAPENTIN 100 MG/1
200 CAPSULE ORAL NIGHTLY
Status: DISPENSED | OUTPATIENT
Start: 2024-12-07

## 2024-12-07 RX ORDER — TAMSULOSIN HYDROCHLORIDE 0.4 MG/1
0.4 CAPSULE ORAL DAILY
Status: DISPENSED | OUTPATIENT
Start: 2024-12-07

## 2024-12-07 RX ORDER — ENOXAPARIN SODIUM 100 MG/ML
40 INJECTION SUBCUTANEOUS
Status: DISPENSED | OUTPATIENT
Start: 2024-12-07

## 2024-12-07 RX ORDER — DOCUSATE SODIUM 100 MG/1
100 CAPSULE, LIQUID FILLED ORAL 2 TIMES DAILY
Status: DISPENSED | OUTPATIENT
Start: 2024-12-07

## 2024-12-07 RX ORDER — ONDANSETRON 4 MG/1
4 TABLET, ORALLY DISINTEGRATING ORAL EVERY 8 HOURS PRN
Status: ACTIVE | OUTPATIENT
Start: 2024-12-07

## 2024-12-07 RX ORDER — POLYETHYLENE GLYCOL 3350 17 G/17G
17 POWDER, FOR SOLUTION ORAL DAILY
Status: DISPENSED | OUTPATIENT
Start: 2024-12-07

## 2024-12-07 RX ORDER — AMLODIPINE BESYLATE 10 MG/1
10 TABLET ORAL DAILY
Status: DISPENSED | OUTPATIENT
Start: 2024-12-07

## 2024-12-07 RX ORDER — TALC
3 POWDER (GRAM) TOPICAL NIGHTLY PRN
Status: ACTIVE | OUTPATIENT
Start: 2024-12-07

## 2024-12-07 RX ORDER — CHOLECALCIFEROL (VITAMIN D3) 25 MCG
1000 TABLET ORAL DAILY
Status: DISPENSED | OUTPATIENT
Start: 2024-12-07

## 2024-12-07 RX ORDER — ONDANSETRON HYDROCHLORIDE 2 MG/ML
4 INJECTION, SOLUTION INTRAVENOUS EVERY 8 HOURS PRN
Status: ACTIVE | OUTPATIENT
Start: 2024-12-07

## 2024-12-07 RX ADMIN — ENOXAPARIN SODIUM 40 MG: 40 INJECTION SUBCUTANEOUS at 09:48

## 2024-12-07 RX ADMIN — OXYCODONE HYDROCHLORIDE 5 MG: 5 TABLET ORAL at 02:42

## 2024-12-07 RX ADMIN — DOCUSATE SODIUM 100 MG: 100 CAPSULE, LIQUID FILLED ORAL at 20:47

## 2024-12-07 RX ADMIN — POLYETHYLENE GLYCOL 3350 17 G: 17 POWDER, FOR SOLUTION ORAL at 09:48

## 2024-12-07 RX ADMIN — ACETAMINOPHEN 650 MG: 325 TABLET, FILM COATED ORAL at 10:00

## 2024-12-07 RX ADMIN — LOSARTAN POTASSIUM: 50 TABLET, FILM COATED ORAL at 09:49

## 2024-12-07 RX ADMIN — Medication 1000 UNITS: at 09:49

## 2024-12-07 RX ADMIN — AMLODIPINE BESYLATE 10 MG: 10 TABLET ORAL at 09:49

## 2024-12-07 RX ADMIN — OXYCODONE HYDROCHLORIDE 5 MG: 5 TABLET ORAL at 22:05

## 2024-12-07 RX ADMIN — TAMSULOSIN HYDROCHLORIDE 0.4 MG: 0.4 CAPSULE ORAL at 09:49

## 2024-12-07 RX ADMIN — GABAPENTIN 200 MG: 100 CAPSULE ORAL at 02:38

## 2024-12-07 RX ADMIN — GABAPENTIN 200 MG: 100 CAPSULE ORAL at 20:46

## 2024-12-07 RX ADMIN — OXYCODONE HYDROCHLORIDE 5 MG: 5 TABLET ORAL at 16:49

## 2024-12-07 RX ADMIN — ACETAMINOPHEN 650 MG: 325 TABLET, FILM COATED ORAL at 22:07

## 2024-12-07 RX ADMIN — OXYCODONE HYDROCHLORIDE 5 MG: 5 TABLET ORAL at 10:00

## 2024-12-07 RX ADMIN — ACETAMINOPHEN 650 MG: 325 TABLET, FILM COATED ORAL at 03:13

## 2024-12-07 RX ADMIN — ACETAMINOPHEN 650 MG: 325 TABLET, FILM COATED ORAL at 16:49

## 2024-12-07 SDOH — ECONOMIC STABILITY: INCOME INSECURITY: IN THE PAST 12 MONTHS HAS THE ELECTRIC, GAS, OIL, OR WATER COMPANY THREATENED TO SHUT OFF SERVICES IN YOUR HOME?: NO

## 2024-12-07 SDOH — ECONOMIC STABILITY: HOUSING INSECURITY: IN THE LAST 12 MONTHS, WAS THERE A TIME WHEN YOU WERE NOT ABLE TO PAY THE MORTGAGE OR RENT ON TIME?: PATIENT DECLINED

## 2024-12-07 SDOH — SOCIAL STABILITY: SOCIAL INSECURITY: HAVE YOU HAD ANY THOUGHTS OF HARMING ANYONE ELSE?: NO

## 2024-12-07 SDOH — SOCIAL STABILITY: SOCIAL NETWORK: HOW OFTEN DO YOU GET TOGETHER WITH FRIENDS OR RELATIVES?: PATIENT DECLINED

## 2024-12-07 SDOH — SOCIAL STABILITY: SOCIAL INSECURITY: DOES ANYONE TRY TO KEEP YOU FROM HAVING/CONTACTING OTHER FRIENDS OR DOING THINGS OUTSIDE YOUR HOME?: NO

## 2024-12-07 SDOH — HEALTH STABILITY: PHYSICAL HEALTH
HOW OFTEN DO YOU NEED TO HAVE SOMEONE HELP YOU WHEN YOU READ INSTRUCTIONS, PAMPHLETS, OR OTHER WRITTEN MATERIAL FROM YOUR DOCTOR OR PHARMACY?: NEVER

## 2024-12-07 SDOH — SOCIAL STABILITY: SOCIAL NETWORK
DO YOU BELONG TO ANY CLUBS OR ORGANIZATIONS SUCH AS CHURCH GROUPS, UNIONS, FRATERNAL OR ATHLETIC GROUPS, OR SCHOOL GROUPS?: PATIENT DECLINED

## 2024-12-07 SDOH — ECONOMIC STABILITY: HOUSING INSECURITY: AT ANY TIME IN THE PAST 12 MONTHS, WERE YOU HOMELESS OR LIVING IN A SHELTER (INCLUDING NOW)?: PATIENT DECLINED

## 2024-12-07 SDOH — SOCIAL STABILITY: SOCIAL INSECURITY
WITHIN THE LAST YEAR, HAVE YOU BEEN HUMILIATED OR EMOTIONALLY ABUSED IN OTHER WAYS BY YOUR PARTNER OR EX-PARTNER?: PATIENT DECLINED

## 2024-12-07 SDOH — SOCIAL STABILITY: SOCIAL NETWORK: IN A TYPICAL WEEK, HOW MANY TIMES DO YOU TALK ON THE PHONE WITH FAMILY, FRIENDS, OR NEIGHBORS?: PATIENT DECLINED

## 2024-12-07 SDOH — HEALTH STABILITY: PHYSICAL HEALTH: ON AVERAGE, HOW MANY MINUTES DO YOU ENGAGE IN EXERCISE AT THIS LEVEL?: 0 MIN

## 2024-12-07 SDOH — SOCIAL STABILITY: SOCIAL INSECURITY: ARE YOU MARRIED, WIDOWED, DIVORCED, SEPARATED, NEVER MARRIED, OR LIVING WITH A PARTNER?: PATIENT DECLINED

## 2024-12-07 SDOH — SOCIAL STABILITY: SOCIAL INSECURITY
WITHIN THE LAST YEAR, HAVE YOU BEEN RAPED OR FORCED TO HAVE ANY KIND OF SEXUAL ACTIVITY BY YOUR PARTNER OR EX-PARTNER?: PATIENT DECLINED

## 2024-12-07 SDOH — SOCIAL STABILITY: SOCIAL INSECURITY: WITHIN THE LAST YEAR, HAVE YOU BEEN AFRAID OF YOUR PARTNER OR EX-PARTNER?: PATIENT DECLINED

## 2024-12-07 SDOH — ECONOMIC STABILITY: FOOD INSECURITY: HOW HARD IS IT FOR YOU TO PAY FOR THE VERY BASICS LIKE FOOD, HOUSING, MEDICAL CARE, AND HEATING?: PATIENT DECLINED

## 2024-12-07 SDOH — SOCIAL STABILITY: SOCIAL NETWORK: HOW OFTEN DO YOU ATTEND CHURCH OR RELIGIOUS SERVICES?: PATIENT DECLINED

## 2024-12-07 SDOH — HEALTH STABILITY: PHYSICAL HEALTH: ON AVERAGE, HOW MANY DAYS PER WEEK DO YOU ENGAGE IN MODERATE TO STRENUOUS EXERCISE (LIKE A BRISK WALK)?: 0 DAYS

## 2024-12-07 SDOH — SOCIAL STABILITY: SOCIAL INSECURITY: HAS ANYONE EVER THREATENED TO HURT YOUR FAMILY OR YOUR PETS?: NO

## 2024-12-07 SDOH — ECONOMIC STABILITY: FOOD INSECURITY: WITHIN THE PAST 12 MONTHS, THE FOOD YOU BOUGHT JUST DIDN'T LAST AND YOU DIDN'T HAVE MONEY TO GET MORE.: NEVER TRUE

## 2024-12-07 SDOH — ECONOMIC STABILITY: HOUSING INSECURITY: IN THE PAST 12 MONTHS, HOW MANY TIMES HAVE YOU MOVED WHERE YOU WERE LIVING?: 0

## 2024-12-07 SDOH — ECONOMIC STABILITY: FOOD INSECURITY: WITHIN THE PAST 12 MONTHS, YOU WORRIED THAT YOUR FOOD WOULD RUN OUT BEFORE YOU GOT THE MONEY TO BUY MORE.: NEVER TRUE

## 2024-12-07 SDOH — SOCIAL STABILITY: SOCIAL INSECURITY
WITHIN THE LAST YEAR, HAVE YOU BEEN KICKED, HIT, SLAPPED, OR OTHERWISE PHYSICALLY HURT BY YOUR PARTNER OR EX-PARTNER?: PATIENT DECLINED

## 2024-12-07 SDOH — SOCIAL STABILITY: SOCIAL NETWORK: HOW OFTEN DO YOU ATTEND MEETINGS OF THE CLUBS OR ORGANIZATIONS YOU BELONG TO?: PATIENT DECLINED

## 2024-12-07 SDOH — HEALTH STABILITY: MENTAL HEALTH
DO YOU FEEL STRESS - TENSE, RESTLESS, NERVOUS, OR ANXIOUS, OR UNABLE TO SLEEP AT NIGHT BECAUSE YOUR MIND IS TROUBLED ALL THE TIME - THESE DAYS?: NOT AT ALL

## 2024-12-07 SDOH — SOCIAL STABILITY: SOCIAL INSECURITY: ARE YOU OR HAVE YOU BEEN THREATENED OR ABUSED PHYSICALLY, EMOTIONALLY, OR SEXUALLY BY ANYONE?: NO

## 2024-12-07 SDOH — SOCIAL STABILITY: SOCIAL INSECURITY: DO YOU FEEL ANYONE HAS EXPLOITED OR TAKEN ADVANTAGE OF YOU FINANCIALLY OR OF YOUR PERSONAL PROPERTY?: NO

## 2024-12-07 SDOH — SOCIAL STABILITY: SOCIAL INSECURITY: HAVE YOU HAD THOUGHTS OF HARMING ANYONE ELSE?: NO

## 2024-12-07 SDOH — ECONOMIC STABILITY: TRANSPORTATION INSECURITY
IN THE PAST 12 MONTHS, HAS LACK OF TRANSPORTATION KEPT YOU FROM MEDICAL APPOINTMENTS OR FROM GETTING MEDICATIONS?: PATIENT DECLINED

## 2024-12-07 SDOH — SOCIAL STABILITY: SOCIAL INSECURITY: ABUSE: ADULT

## 2024-12-07 SDOH — SOCIAL STABILITY: SOCIAL INSECURITY: DO YOU FEEL UNSAFE GOING BACK TO THE PLACE WHERE YOU ARE LIVING?: NO

## 2024-12-07 SDOH — SOCIAL STABILITY: SOCIAL INSECURITY: WERE YOU ABLE TO COMPLETE ALL THE BEHAVIORAL HEALTH SCREENINGS?: YES

## 2024-12-07 SDOH — SOCIAL STABILITY: SOCIAL INSECURITY: ARE THERE ANY APPARENT SIGNS OF INJURIES/BEHAVIORS THAT COULD BE RELATED TO ABUSE/NEGLECT?: NO

## 2024-12-07 ASSESSMENT — ENCOUNTER SYMPTOMS
ALLERGIC/IMMUNOLOGIC NEGATIVE: 1
CARDIOVASCULAR NEGATIVE: 1
ACTIVITY CHANGE: 1
HEMATOLOGIC/LYMPHATIC NEGATIVE: 1
PSYCHIATRIC NEGATIVE: 1
GASTROINTESTINAL NEGATIVE: 1
EYES NEGATIVE: 1
RESPIRATORY NEGATIVE: 1
MUSCULOSKELETAL NEGATIVE: 1
NEUROLOGICAL NEGATIVE: 1

## 2024-12-07 ASSESSMENT — COGNITIVE AND FUNCTIONAL STATUS - GENERAL
DRESSING REGULAR UPPER BODY CLOTHING: A LITTLE
STANDING UP FROM CHAIR USING ARMS: A LITTLE
DAILY ACTIVITIY SCORE: 19
DRESSING REGULAR UPPER BODY CLOTHING: A LITTLE
DRESSING REGULAR LOWER BODY CLOTHING: A LITTLE
WALKING IN HOSPITAL ROOM: A LOT
STANDING UP FROM CHAIR USING ARMS: A LITTLE
TOILETING: A LITTLE
PERSONAL GROOMING: A LITTLE
HELP NEEDED FOR BATHING: A LITTLE
HELP NEEDED FOR BATHING: A LITTLE
CLIMB 3 TO 5 STEPS WITH RAILING: A LOT
PERSONAL GROOMING: A LITTLE
MOVING TO AND FROM BED TO CHAIR: A LITTLE
WALKING IN HOSPITAL ROOM: A LOT
MOBILITY SCORE: 18
MOBILITY SCORE: 18
CLIMB 3 TO 5 STEPS WITH RAILING: A LOT
DRESSING REGULAR LOWER BODY CLOTHING: A LITTLE
TOILETING: A LITTLE
DAILY ACTIVITIY SCORE: 19
PATIENT BASELINE BEDBOUND: NO
MOVING TO AND FROM BED TO CHAIR: A LITTLE

## 2024-12-07 ASSESSMENT — ACTIVITIES OF DAILY LIVING (ADL)
WALKS IN HOME: NEEDS ASSISTANCE
HEARING - RIGHT EAR: FUNCTIONAL
LACK_OF_TRANSPORTATION: PATIENT DECLINED
FEEDING YOURSELF: INDEPENDENT
DRESSING YOURSELF: INDEPENDENT
BATHING: NEEDS ASSISTANCE
TOILETING: NEEDS ASSISTANCE
ADEQUATE_TO_COMPLETE_ADL: YES
LACK_OF_TRANSPORTATION: NO
HEARING - LEFT EAR: FUNCTIONAL
PATIENT'S MEMORY ADEQUATE TO SAFELY COMPLETE DAILY ACTIVITIES?: YES
GROOMING: INDEPENDENT
JUDGMENT_ADEQUATE_SAFELY_COMPLETE_DAILY_ACTIVITIES: YES
ASSISTIVE_DEVICE: WALKER

## 2024-12-07 ASSESSMENT — PATIENT HEALTH QUESTIONNAIRE - PHQ9
2. FEELING DOWN, DEPRESSED OR HOPELESS: NOT AT ALL
1. LITTLE INTEREST OR PLEASURE IN DOING THINGS: NOT AT ALL
SUM OF ALL RESPONSES TO PHQ9 QUESTIONS 1 & 2: 0

## 2024-12-07 ASSESSMENT — PAIN - FUNCTIONAL ASSESSMENT
PAIN_FUNCTIONAL_ASSESSMENT: 0-10

## 2024-12-07 ASSESSMENT — LIFESTYLE VARIABLES
SKIP TO QUESTIONS 9-10: 1
HOW OFTEN DO YOU HAVE A DRINK CONTAINING ALCOHOL: NEVER
HOW OFTEN DO YOU HAVE 6 OR MORE DRINKS ON ONE OCCASION: NEVER
AUDIT-C TOTAL SCORE: 0
AUDIT-C TOTAL SCORE: 0
HOW MANY STANDARD DRINKS CONTAINING ALCOHOL DO YOU HAVE ON A TYPICAL DAY: PATIENT DOES NOT DRINK

## 2024-12-07 ASSESSMENT — PAIN DESCRIPTION - LOCATION: LOCATION: BACK

## 2024-12-07 ASSESSMENT — PAIN SCALES - GENERAL
PAINLEVEL_OUTOF10: 8
PAINLEVEL_OUTOF10: 3
PAINLEVEL_OUTOF10: 8
PAINLEVEL_OUTOF10: 0 - NO PAIN
PAINLEVEL_OUTOF10: 9
PAINLEVEL_OUTOF10: 0 - NO PAIN
PAINLEVEL_OUTOF10: 7
PAINLEVEL_OUTOF10: 7
PAINLEVEL_OUTOF10: 0 - NO PAIN

## 2024-12-07 ASSESSMENT — PAIN DESCRIPTION - ORIENTATION: ORIENTATION: LOWER

## 2024-12-07 ASSESSMENT — PAIN SCALES - PAIN ASSESSMENT IN ADVANCED DEMENTIA (PAINAD): TOTALSCORE: MEDICATION (SEE MAR)

## 2024-12-07 NOTE — H&P
"History Of Present Illness  Chris Monique is a 74 y.o. male iwith a PMH of HTN, obesity, OA, neurogenic claudication due to lumbar spine stenosis s/p spine surgery presenting with LE weakness and difficulty ambulating.    Mr Monique underwent L2-L3, L4-L5 lumbar spine laminectomy and fusion performed by Dr Jo on 11/22/24. Mr Monique was discharged home on 11/24. States that prior to discharge home he was evaluated by PT, ambulated around the unit twice, and was deemed to be ok to go home.   From the EMR, it seems that initially he was supposed to have [facility], however, there is a note stating they were \"unable to accept and process the referral at this time.\"  He then went through A. He underwent the initial exam/interview on Thanksgiving by A. Physical therapy then came to his home on Monday, 4 days ago, who walked him around his house with a walker, and walked him up 5 stairs. He was instructed to not go upstairs to his bedroom since there were more steps to climb and he could only climb 4 without assistance. Since being seen by PT he essentially has sat and slept in his recliner. Has been getting up with the walker only to use the bathroom.     Tuesday, OT came for their initial visit.   Wednesday a nurse came to change his bandage, and per pt she did not do a good job.   Thursday (yesterday), OT came again and gave him advice on how to use his walker.   Early am today he used his walker to get to the bathroom, and on his way back to his recliner, felt weak in the knees, so slid down the wall and sat on the floor. With the help of his wife, he crawled back to the recliner where he sat until a nurse visited him today. He told the nurse what had happened, and she instructed him to call 911 to come to the ED to have his spinal hardware checked.    Mr Monique feels that he has been getting inadequate care since he has been home from surgery.        In the ED, labs were unremarkable. CT L spine did not show any " defect with the hardware.   Pt being admitted for rehab.      Past Medical History  Past Medical History:   Diagnosis Date    Aortic atherosclerosis (CMS-HCC)     without abdominal aortic aneurysm, vascular US 5/3/23    Cervical spondylosis with myelopathy     HLD (hyperlipidemia)     HTN (hypertension)     Low back pain     Lumbar stenosis with neurogenic claudication     Spinal cord compression (Multi)        Surgical History  Past Surgical History:   Procedure Laterality Date    CERVICAL LAMINECTOMY      C3-C7 04/19/24    COLONOSCOPY      HERNIA REPAIR      SHOULDER OPEN ROTATOR CUFF REPAIR Left     TOTAL KNEE ARTHROPLASTY Bilateral         Social History  He reports that he quit smoking about 47 years ago. His smoking use included cigarettes. He started smoking about 52 years ago. He has a 1.3 pack-year smoking history. He has never used smokeless tobacco. He reports current alcohol use of about 3.0 standard drinks of alcohol per week. He reports that he does not use drugs.    Family History  Family History   Problem Relation Name Age of Onset    Dementia Mother      Prostate cancer Father      Hydrocephalus Sister      Dementia Brother      Hydrocephalus Brother      Kidney disease Brother          Allergies  Banana, Bee pollen, and Watermelon    Review of Systems   Constitutional:  Positive for activity change.   HENT: Negative.     Eyes: Negative.    Respiratory: Negative.     Cardiovascular: Negative.    Gastrointestinal: Negative.    Genitourinary: Negative.    Musculoskeletal: Negative.    Skin: Negative.    Allergic/Immunologic: Negative.    Neurological: Negative.    Hematological: Negative.    Psychiatric/Behavioral: Negative.          Physical Exam  Vitals reviewed.   Constitutional:       Appearance: Normal appearance.      Comments: Elderly male, no distress. Alert, oriented.    HENT:      Head: Normocephalic and atraumatic.      Mouth/Throat:      Mouth: Mucous membranes are moist.   Eyes:       "Extraocular Movements: Extraocular movements intact.      Conjunctiva/sclera: Conjunctivae normal.      Pupils: Pupils are equal, round, and reactive to light.   Cardiovascular:      Rate and Rhythm: Normal rate and regular rhythm.      Pulses: Normal pulses.      Heart sounds: Normal heart sounds.   Pulmonary:      Effort: Pulmonary effort is normal.      Breath sounds: Normal breath sounds.   Abdominal:      General: Abdomen is flat. Bowel sounds are normal.      Palpations: Abdomen is soft.   Musculoskeletal:         General: Normal range of motion.   Skin:     General: Skin is warm and dry.   Neurological:      General: No focal deficit present.      Mental Status: He is alert and oriented to person, place, and time.      Comments: Strength b/l LE 5/5.    Psychiatric:         Mood and Affect: Mood normal.         Behavior: Behavior normal.         Thought Content: Thought content normal.         Judgment: Judgment normal.          Last Recorded Vitals  Blood pressure 145/88, pulse 88, temperature 36.8 °C (98.2 °F), temperature source Oral, resp. rate 16, height 1.803 m (5' 11\"), weight 102 kg (225 lb), SpO2 98%.    Relevant Results        Results for orders placed or performed during the hospital encounter of 12/06/24 (from the past 24 hours)   CBC and Auto Differential   Result Value Ref Range    WBC 9.2 4.4 - 11.3 x10*3/uL    nRBC 0.0 0.0 - 0.0 /100 WBCs    RBC 4.22 (L) 4.50 - 5.90 x10*6/uL    Hemoglobin 12.7 (L) 13.5 - 17.5 g/dL    Hematocrit 38.3 (L) 41.0 - 52.0 %    MCV 91 80 - 100 fL    MCH 30.1 26.0 - 34.0 pg    MCHC 33.2 32.0 - 36.0 g/dL    RDW 13.1 11.5 - 14.5 %    Platelets 514 (H) 150 - 450 x10*3/uL    Neutrophils % 82.4 40.0 - 80.0 %    Immature Granulocytes %, Automated 0.4 0.0 - 0.9 %    Lymphocytes % 12.8 13.0 - 44.0 %    Monocytes % 3.0 2.0 - 10.0 %    Eosinophils % 1.0 0.0 - 6.0 %    Basophils % 0.4 0.0 - 2.0 %    Neutrophils Absolute 7.60 (H) 1.60 - 5.50 x10*3/uL    Immature Granulocytes " Absolute, Automated 0.04 0.00 - 0.50 x10*3/uL    Lymphocytes Absolute 1.18 0.80 - 3.00 x10*3/uL    Monocytes Absolute 0.28 0.05 - 0.80 x10*3/uL    Eosinophils Absolute 0.09 0.00 - 0.40 x10*3/uL    Basophils Absolute 0.04 0.00 - 0.10 x10*3/uL   Comprehensive metabolic panel   Result Value Ref Range    Glucose 105 (H) 74 - 99 mg/dL    Sodium 136 136 - 145 mmol/L    Potassium 3.6 3.5 - 5.3 mmol/L    Chloride 98 98 - 107 mmol/L    Bicarbonate 31 21 - 32 mmol/L    Anion Gap 11 10 - 20 mmol/L    Urea Nitrogen 8 6 - 23 mg/dL    Creatinine 0.50 0.50 - 1.30 mg/dL    eGFR >90 >60 mL/min/1.73m*2    Calcium 9.4 8.6 - 10.3 mg/dL    Albumin 3.9 3.4 - 5.0 g/dL    Alkaline Phosphatase 116 33 - 136 U/L    Total Protein 7.2 6.4 - 8.2 g/dL    AST 15 9 - 39 U/L    Bilirubin, Total 0.4 0.0 - 1.2 mg/dL    ALT 17 10 - 52 U/L   Sedimentation rate, automated   Result Value Ref Range    Sedimentation Rate 65 (H) 0 - 20 mm/h   C-reactive protein   Result Value Ref Range    C-Reactive Protein 1.54 (H) <1.00 mg/dL   Urinalysis with Reflex Culture and Microscopic   Result Value Ref Range    Color, Urine Colorless (N) Light-Yellow, Yellow, Dark-Yellow    Appearance, Urine Clear Clear    Specific Gravity, Urine 1.011 1.005 - 1.035    pH, Urine 7.5 5.0, 5.5, 6.0, 6.5, 7.0, 7.5, 8.0    Protein, Urine NEGATIVE NEGATIVE, 10 (TRACE), 20 (TRACE) mg/dL    Glucose, Urine Normal Normal mg/dL    Blood, Urine NEGATIVE NEGATIVE    Ketones, Urine NEGATIVE NEGATIVE mg/dL    Bilirubin, Urine NEGATIVE NEGATIVE    Urobilinogen, Urine Normal Normal mg/dL    Nitrite, Urine NEGATIVE NEGATIVE    Leukocyte Esterase, Urine NEGATIVE NEGATIVE     CT lumbar spine w IV contrast    Result Date: 12/6/2024  STUDY: CT Lumbar Spine with IV Contrast; 12/06/2024 at 5:31 PM INDICATION: Lower back pain, weakness. COMPARISON: None available. ACCESSION NUMBER(S): [id number] ORDERING CLINICIAN: LOWELL VARGAS TECHNIQUE:  CT of the lumbar spine was performed with intravenous  contrast.  Sagittal and coronal reconstructions were generated. Omnipaque-350 75 mL was administered intravenously. Automated mA/kV exposure control was utilized and patient examination was performed in strict accordance with principles of ALARA. FINDINGS: Status post laminectomy and fusion from L2 through L5. Screws and rods. There is a grade 1 spondylolisthesis at L4-5 as well as mild retrolisthesis at L3-4. The spine is in otherwise good alignment. There is no fracture or traumatic subluxation.  There are no areas of abnormal enhancement. The vertebral body heights are well maintained.  There is disc space narrowing with degenerative endplate changes at L3-4, L4-5, and L5-S1.  No significant central canal stenosis is demonstrated.  The right L3-4 and left L4-5 neuroforamina are narrowed. The paravertebral soft tissues are within normal limits.  The visualized abdomen is unremarkable.    1. Status post laminectomy and fusion from L2 through L5. There is a grade 1 spondylolisthesis at L4-5 as well as mild retrolisthesis at L3-4. 2. No significant central canal stenosis at any level. 3. The right L3-4 and left L4-5 neuroforamina are narrowed. Signed by Alex Bach MD    XR chest 1 view    Result Date: 12/6/2024  STUDY: Chest Radiograph;  12/6/2024 1:50PM INDICATION: Weakness. COMPARISON: None Available ACCESSION NUMBER(S): [id number] ORDERING CLINICIAN: LOWELL VARGAS TECHNIQUE:  Frontal chest was obtained at 13:50 hours. FINDINGS: CARDIOMEDIASTINAL SILHOUETTE: Cardiomediastinal silhouette is normal in size and configuration.  LUNGS: Small slightly ill-defined and irregular nodular opacity overlying the left posterior eighth rib may reflect a confluence of overlying bony and soft tissues.  Small focal infiltrate or irregular pulmonary nodule cannot be excluded.  No evidence of pleural effusion. ABDOMEN: No remarkable upper abdominal findings.  BONES: Suggestion of bilateral surgical fusion involving the  "inferior cervical spine which is partially imaged.  No acute osseous changes.    Slightly ill-defined small nodular radiopacity overlying the left inferior thorax overlying the left posterior eighth rib is identified with differential to include a small nodular infiltrate or atelectasis.  Small irregular pulmonary nodule cannot be excluded. Short-term follow-up chest radiograph or CT imaging of the chest can be performed for further evaluation. Signed by Kevin Reed MD    FL less than 1 hour         Assessment/Plan   Assessment & Plan    Difficulty ambulating s/p L spine laminectomy and fusion  - Pt essentially sedentary in his recliner since the surgery unless PT present or needing to use the bathroom. Feeling of weakness when ambulating likely due to this. Strength testing while lying in bed, strength was normal. Since it seemed that Mr Monique did not like ambulating \"unsupervised\" as he referred to it, he would benefit from acute rehab.   - PT/OT eval  - Social work consult for Acute rehab     Back pain  - continue oxycodone for severe pain, Tylenol for less severe pain  - muscle relaxant    HTN  - home medications.          I spent 60 minutes in the professional and overall care of this patient.      Kristen Allred MD    "

## 2024-12-07 NOTE — CARE PLAN
Problem: Pain - Adult  Goal: Verbalizes/displays adequate comfort level or baseline comfort level  Outcome: Progressing     Problem: Safety - Adult  Goal: Free from fall injury  Outcome: Progressing     Problem: Discharge Planning  Goal: Discharge to home or other facility with appropriate resources  Outcome: Progressing     Problem: Chronic Conditions and Co-morbidities  Goal: Patient's chronic conditions and co-morbidity symptoms are monitored and maintained or improved  Outcome: Progressing     Problem: Skin  Goal: Decreased wound size/increased tissue granulation at next dressing change  Outcome: Progressing  Flowsheets (Taken 12/7/2024 0204 by Ragini Baker RN)  Decreased wound size/increased tissue granulation at next dressing change:   Promote sleep for wound healing   Utilize specialty bed per algorithm   Protective dressings over bony prominences  Goal: Participates in plan/prevention/treatment measures  Outcome: Progressing  Goal: Prevent/manage excess moisture  Outcome: Progressing  Flowsheets (Taken 12/7/2024 1816)  Prevent/manage excess moisture: Moisturize dry skin  Goal: Prevent/minimize sheer/friction injuries  Outcome: Progressing  Flowsheets (Taken 12/7/2024 1816)  Prevent/minimize sheer/friction injuries: HOB 30 degrees or less  Goal: Promote/optimize nutrition  Outcome: Progressing  Flowsheets (Taken 12/7/2024 1816)  Promote/optimize nutrition: Consume > 50% meals/supplements  Goal: Promote skin healing  Outcome: Progressing  Flowsheets (Taken 12/7/2024 1816)  Promote skin healing: Assess skin/pad under line(s)/device(s)   The patient's goals for the shift include pain managment    The clinical goals for the shift include pain managment

## 2024-12-07 NOTE — CARE PLAN
The patient's goals for the shift include  tay rest    The clinical goals for the shift include safety      Problem: Pain - Adult  Goal: Verbalizes/displays adequate comfort level or baseline comfort level  Outcome: Progressing     Problem: Safety - Adult  Goal: Free from fall injury  Outcome: Progressing     Problem: Discharge Planning  Goal: Discharge to home or other facility with appropriate resources  Outcome: Progressing     Problem: Chronic Conditions and Co-morbidities  Goal: Patient's chronic conditions and co-morbidity symptoms are monitored and maintained or improved  Outcome: Progressing

## 2024-12-07 NOTE — PROGRESS NOTES
Chris Monique is a 74 y.o. male on day 1 of admission presenting with Difficulty in walking.      Subjective   Pt seen this morning. On room air. Reiterates he's unable to care for himself at home due to pain with mobility as well as generalized weakness. Tolerating PO intake. Afebrile. No overnight events reported.        Objective     Last Recorded Vitals  /84 (BP Location: Right arm, Patient Position: Lying)   Pulse 85   Temp 36.9 °C (98.4 °F) (Temporal)   Resp 17   Wt 102 kg (225 lb)   SpO2 100%   Intake/Output last 3 Shifts:  No intake or output data in the 24 hours ending 12/07/24 1616    Admission Weight  Weight: 102 kg (225 lb) (12/06/24 1730)    Daily Weight  12/06/24 : 102 kg (225 lb)    Image Results  Vascular US lower extremity venous duplex left  Narrative: Interpreted By:  Samy Retana,   STUDY:  [facility] US LOWER EXTREMITY VENOUS DUPLEX LEFT;  12/7/2024 12:48 am      INDICATION:  Signs/Symptoms:pt with recent spine surgery, has been sitting in a  recliner most of the time since surgery 11/22. LLE swelling.      ,R60.0 Localized edema      COMPARISON:  None.      ACCESSION NUMBER(S):  [id number]      ORDERING CLINICIAN:  CHARLI ALSTON      TECHNIQUE:  Vascular ultrasound of the left lower extremity was performed.  Real-time compression views as well as Gray scale, color Doppler and  spectral Doppler waveform analysis was performed.      FINDINGS:  Evaluation of the visualized portions of the left common femoral  vein, proximal, mid, and distal femoral vein, and popliteal vein were  performed.  Evaluation of the visualized portions of the  posterior  tibial and peroneal veins were also performed.      The evaluated veins demonstrate normal compressibility. There is  intact venous flow demonstrating normal respiratory variability and  normal augmentation of flow with calf compression. Therefore, there  is no ultrasonographic evidence for deep vein thrombosis within the  evaluated veins.           Impression: No sonographic evidence for deep vein thrombosis within the evaluated  veins of the left lower extremity.      MACRO:  None      Signed by: Samy Retana 12/7/2024 1:35 AM  Dictation workstation:   [id number]      Physical Exam  Constitutional:       Appearance: Normal appearance.   Cardiovascular:      Rate and Rhythm: Normal rate and regular rhythm.   Pulmonary:      Effort: Pulmonary effort is normal.      Breath sounds: Normal breath sounds.   Abdominal:      General: Abdomen is flat. Bowel sounds are normal.      Palpations: Abdomen is soft.   Musculoskeletal:      Cervical back: Normal range of motion.   Skin:     General: Skin is warm.   Neurological:      General: No focal deficit present.      Mental Status: He is alert and oriented to person, place, and time. Mental status is at baseline.   Psychiatric:         Mood and Affect: Mood normal.         Behavior: Behavior normal.         Thought Content: Thought content normal.         Judgment: Judgment normal.         Relevant Results        Assessment & Plan  Difficulty in walking    12/7:  S/p laminectomy 11/24 and neck fusion 4/24 reported.   Has been unable to care for himself at home due to pain and mobility limitations.     No significant acute pathology on imaging.     Plan is for analgesia, pt/ot, and likely dc to rehab pending placement.   Home regimen for chronic conditions  Dvt px with lovetoshiax         Sorin Zee MD

## 2024-12-08 VITALS
TEMPERATURE: 97.7 F | HEIGHT: 71 IN | BODY MASS INDEX: 31.5 KG/M2 | HEART RATE: 84 BPM | SYSTOLIC BLOOD PRESSURE: 140 MMHG | WEIGHT: 225 LBS | OXYGEN SATURATION: 96 % | DIASTOLIC BLOOD PRESSURE: 83 MMHG | RESPIRATION RATE: 18 BRPM

## 2024-12-08 PROCEDURE — G0378 HOSPITAL OBSERVATION PER HR: HCPCS

## 2024-12-08 PROCEDURE — 2500000004 HC RX 250 GENERAL PHARMACY W/ HCPCS (ALT 636 FOR OP/ED): Performed by: HOSPITALIST

## 2024-12-08 PROCEDURE — 97161 PT EVAL LOW COMPLEX 20 MIN: CPT | Mod: GP

## 2024-12-08 PROCEDURE — 2500000001 HC RX 250 WO HCPCS SELF ADMINISTERED DRUGS (ALT 637 FOR MEDICARE OP): Performed by: HOSPITALIST

## 2024-12-08 PROCEDURE — 97165 OT EVAL LOW COMPLEX 30 MIN: CPT | Mod: GO

## 2024-12-08 PROCEDURE — 99232 SBSQ HOSP IP/OBS MODERATE 35: CPT | Performed by: INTERNAL MEDICINE

## 2024-12-08 PROCEDURE — 2500000002 HC RX 250 W HCPCS SELF ADMINISTERED DRUGS (ALT 637 FOR MEDICARE OP, ALT 636 FOR OP/ED): Performed by: HOSPITALIST

## 2024-12-08 PROCEDURE — 1100000001 HC PRIVATE ROOM DAILY

## 2024-12-08 RX ADMIN — ACETAMINOPHEN 650 MG: 325 TABLET, FILM COATED ORAL at 23:44

## 2024-12-08 RX ADMIN — OXYCODONE HYDROCHLORIDE 5 MG: 5 TABLET ORAL at 23:44

## 2024-12-08 RX ADMIN — POLYETHYLENE GLYCOL 3350 17 G: 17 POWDER, FOR SOLUTION ORAL at 09:05

## 2024-12-08 RX ADMIN — OXYCODONE HYDROCHLORIDE 5 MG: 5 TABLET ORAL at 03:23

## 2024-12-08 RX ADMIN — OXYCODONE HYDROCHLORIDE 5 MG: 5 TABLET ORAL at 10:13

## 2024-12-08 RX ADMIN — LOSARTAN POTASSIUM: 50 TABLET, FILM COATED ORAL at 09:04

## 2024-12-08 RX ADMIN — GABAPENTIN 200 MG: 100 CAPSULE ORAL at 20:24

## 2024-12-08 RX ADMIN — AMLODIPINE BESYLATE 10 MG: 10 TABLET ORAL at 09:05

## 2024-12-08 RX ADMIN — Medication 1000 UNITS: at 09:05

## 2024-12-08 RX ADMIN — ENOXAPARIN SODIUM 40 MG: 40 INJECTION SUBCUTANEOUS at 09:05

## 2024-12-08 RX ADMIN — DOCUSATE SODIUM 100 MG: 100 CAPSULE, LIQUID FILLED ORAL at 09:05

## 2024-12-08 RX ADMIN — ACETAMINOPHEN 650 MG: 325 TABLET, FILM COATED ORAL at 17:43

## 2024-12-08 RX ADMIN — ACETAMINOPHEN 650 MG: 325 TABLET, FILM COATED ORAL at 03:25

## 2024-12-08 RX ADMIN — ACETAMINOPHEN 650 MG: 325 TABLET, FILM COATED ORAL at 10:13

## 2024-12-08 RX ADMIN — DOCUSATE SODIUM 100 MG: 100 CAPSULE, LIQUID FILLED ORAL at 20:24

## 2024-12-08 RX ADMIN — OXYCODONE HYDROCHLORIDE 5 MG: 5 TABLET ORAL at 17:43

## 2024-12-08 RX ADMIN — TAMSULOSIN HYDROCHLORIDE 0.4 MG: 0.4 CAPSULE ORAL at 09:04

## 2024-12-08 ASSESSMENT — COGNITIVE AND FUNCTIONAL STATUS - GENERAL
STANDING UP FROM CHAIR USING ARMS: A LITTLE
MOBILITY SCORE: 14
DAILY ACTIVITIY SCORE: 24
DRESSING REGULAR LOWER BODY CLOTHING: A LOT
MOVING TO AND FROM BED TO CHAIR: A LITTLE
TURNING FROM BACK TO SIDE WHILE IN FLAT BAD: A LOT
MOVING FROM LYING ON BACK TO SITTING ON SIDE OF FLAT BED WITH BEDRAILS: A LOT
STANDING UP FROM CHAIR USING ARMS: A LOT
DAILY ACTIVITIY SCORE: 16
MOBILITY SCORE: 22
MOBILITY SCORE: 17
PERSONAL GROOMING: A LITTLE
CLIMB 3 TO 5 STEPS WITH RAILING: A LITTLE
CLIMB 3 TO 5 STEPS WITH RAILING: A LITTLE
WALKING IN HOSPITAL ROOM: A LITTLE
TOILETING: A LOT
MOVING FROM LYING ON BACK TO SITTING ON SIDE OF FLAT BED WITH BEDRAILS: A LITTLE
CLIMB 3 TO 5 STEPS WITH RAILING: A LOT
MOVING TO AND FROM BED TO CHAIR: A LOT
DRESSING REGULAR UPPER BODY CLOTHING: A LITTLE
HELP NEEDED FOR BATHING: A LOT
TURNING FROM BACK TO SIDE WHILE IN FLAT BAD: A LITTLE
WALKING IN HOSPITAL ROOM: A LITTLE
WALKING IN HOSPITAL ROOM: A LITTLE

## 2024-12-08 ASSESSMENT — PAIN SCALES - GENERAL
PAINLEVEL_OUTOF10: 3
PAINLEVEL_OUTOF10: 7
PAINLEVEL_OUTOF10: 3
PAINLEVEL_OUTOF10: 7
PAINLEVEL_OUTOF10: 8
PAINLEVEL_OUTOF10: 7

## 2024-12-08 ASSESSMENT — PAIN DESCRIPTION - LOCATION: LOCATION: BACK

## 2024-12-08 ASSESSMENT — PAIN - FUNCTIONAL ASSESSMENT
PAIN_FUNCTIONAL_ASSESSMENT: 0-10

## 2024-12-08 ASSESSMENT — ACTIVITIES OF DAILY LIVING (ADL)
ADL_ASSISTANCE: INDEPENDENT
ADL_ASSISTANCE: INDEPENDENT
BATHING_ASSISTANCE: MODERATE

## 2024-12-08 ASSESSMENT — PAIN DESCRIPTION - ORIENTATION: ORIENTATION: LOWER

## 2024-12-08 NOTE — PROGRESS NOTES
Physical Therapy    Physical Therapy Evaluation    Patient Name: Chris Monique  MRN: [id number]  Department: [facility] A 5  Room: [facility]504-  Today's Date: 12/8/2024   Time Calculation  Start Time: 1233  Stop Time: 1250  Time Calculation (min): 17 min    Assessment/Plan   PT Assessment  PT Assessment Results: Decreased strength, Decreased range of motion, Decreased endurance, Impaired balance, Decreased mobility, Pain, Orthopedic restrictions  Rehab Prognosis: Good  End of Session Communication: Bedside nurse  Assessment Comment: PT Evaluation Completed. The patient presented with decreased mobility, balance, endurance, strength and increased pain and fatigue. These impairments are negatively impacting his ability to perform at baseline functional level, in home environment.This patient would benefit from skilled therapy intervention to address limitations and progress towards the PT goals.  Anticipate moderate frequency PT needs at discharge. Pt may be able to progress to low intensity therapy with further treatment while admitted.  End of Session Patient Position: Up in chair, Alarm on  IP OR SWING BED PT PLAN  Inpatient or Swing Bed: Inpatient  PT Plan  Treatment/Interventions: Bed mobility, Transfer training, Gait training, Balance training, Stair training, Strengthening, Endurance training, Range of motion, Therapeutic activity, Therapeutic exercise, Home exercise program  PT Plan: Ongoing PT  PT Frequency: 3 times per week  PT Discharge Recommendations: Moderate intensity level of continued care  PT Recommended Transfer Status: Assist x1  PT - OK to Discharge: Yes (PT POC established.)    Subjective   General Visit Information:  General  Reason for Referral: presented emergency department with complaints of fall and weakness, s/p L2-L3, L4-L5 lumbar spine laminectomy and fusion performed by Dr Jo on 11/22/24.  Referred By: Kristen Allred MD  Past Medical History Relevant to Rehab:   Past Medical History:   Diagnosis  Date    Aortic atherosclerosis (CMS-HCC)     without abdominal aortic aneurysm, vascular US 5/3/23    Cervical spondylosis with myelopathy     HLD (hyperlipidemia)     HTN (hypertension)     Low back pain     Lumbar stenosis with neurogenic claudication     Spinal cord compression (Multi)        Prior to Session Communication: Bedside nurse  Patient Position Received: Bed, 3 rail up, Alarm on  General Comment: Pt pleasant and agreeable to PT eval.  Home Living:  Home Living  Type of Home: House  Lives With: Spouse  Home Adaptive Equipment: Walker rolling or standard, Cane, Reacher  Home Layout: Multi-level, Bed/bath upstairs, Stairs to alternate level with rails, Able to live on main level with bedroom/bathroom  Alternate Level Stairs-Number of Steps: 7  Home Access: Stairs to enter without rails  Entrance Stairs-Number of Steps: 1  Bathroom Toilet: Adaptive toilet seating (with grab bars)  Bathroom Equipment: Raised toilet seat with rails  Prior Level of Function:  Prior Function Per Pt/Caregiver Report  Level of [place]: Independent with ADLs and functional transfers, Independent with homemaking with ambulation  ADL Assistance: Independent  Homemaking Assistance: Independent  Ambulatory Assistance: Independent  Leisure: Exercises regularly at baseline.  Precautions:  Precautions  Medical Precautions: Fall precautions  Post-Surgical Precautions: Spinal precautions     Vital Signs (Past 2hrs)                 Objective   Pain:  Pain Assessment  Pain Assessment: 0-10  0-10 (Numeric) Pain Score: 7  Pain Location: Back  Cognition:  Cognition  Overall Cognitive Status: Within Functional Limits  Orientation Level: Oriented X4    General Assessments:  Activity Tolerance  Endurance: Tolerates 10 - 20 min exercise with multiple rests    Sensation  Light Touch:  (Numbness in rudy LEs with L worse than R)         Postural Control  Postural Control: Within Functional Limits    Static Sitting Balance  Static Sitting-Balance  Support: Feet supported, Bilateral upper extremity supported  Static Sitting-Level of Assistance: Close supervision  Dynamic Sitting Balance  Dynamic Sitting-Balance Support: Feet supported, Bilateral upper extremity supported  Dynamic Sitting-Level of Assistance: Close supervision    Static Standing Balance  Static Standing-Balance Support: Bilateral upper extremity supported  Static Standing-Level of Assistance: Contact guard  Dynamic Standing Balance  Dynamic Standing-Balance Support: Bilateral upper extremity supported  Dynamic Standing-Level of Assistance: Contact guard  Functional Assessments:  Bed Mobility  Bed Mobility: Yes  Bed Mobility 1  Bed Mobility 1: Supine to sitting  Level of Assistance 1: Close supervision  Bed Mobility Comments 1: Cues for log roll technique however poor adherence    Transfers  Transfer: Yes  Transfer 1  Technique 1: Sit to stand, Stand to sit  Transfer Device 1: Walker  Transfer Level of Assistance 1: Contact guard  Trials/Comments 1: Cues for hand placement. Increased effort to complete.    Ambulation/Gait Training  Ambulation/Gait Training Performed: Yes  Ambulation/Gait Training 1  Surface 1: Level tile  Device 1: Rolling walker  Assistance 1: Contact guard  Comments/Distance (ft) 1: 40 ft. Pt ambulates with decreased alisa and step length. Mildly forward flexed posture. Pt reporting LE fatigue with ambulation.       Extremity/Trunk Assessments:  RUE   RUE : Within Functional Limits  LUE   LUE: Within Functional Limits  RLE   RLE :  (Strength >3/5 based on observation of functional mobility. ROM WFL.)  LLE   LLE :  (Strength >3/5 based on observation of functional mobility. ROM WFL.)  Outcome Measures:  [facility] Basic Mobility  Turning from your back to your side while in a flat bed without using bedrails: A little  Moving from lying on your back to sitting on the side of a flat bed without using bedrails: A little  Moving to and from bed to chair (including a wheelchair): A  little  Standing up from a chair using your arms (e.g. wheelchair or bedside chair): A little  To walk in hospital room: A little  Climbing 3-5 steps with railing: A lot  Basic Mobility - Total Score: 17    Encounter Problems       Encounter Problems (Active)       Balance       complete all mobility with normal balance while dual tasking, negotiating in a dynamic environment, carrying items, etc., with proactive and reactive static and dynamic standing and sitting tasks, with mod I and RW, >15 minutes.         Start:  12/08/24    Expected End:  12/22/24               Mobility       STG - Patient will ambulate 150 ft mod I with a RW.        Start:  12/08/24    Expected End:  12/22/24            STG - Patient will ascend and descend 7 stairs mod I using unilateral HR and LRAD.        Start:  12/08/24    Expected End:  12/22/24               PT Transfers       STG - Patient will perform bed mobility independently using log roll technique.        Start:  12/08/24    Expected End:  12/22/24            STG - Patient will transfer sit to and from stand mod I with a RW       Start:  12/08/24    Expected End:  12/22/24               Pain - Adult          Safety       LTG - Patient will adhere to spinal precautions during ADL's and transfers independently.        Start:  12/08/24    Expected End:  12/22/24                   Education Documentation  Precautions, taught by Violet Khanna PT at 12/8/2024  1:18 PM.  Learner: Patient  Readiness: Acceptance  Method: Explanation  Response: Verbalizes Understanding    Body Mechanics, taught by Violet Khanna, PT at 12/8/2024  1:18 PM.  Learner: Patient  Readiness: Acceptance  Method: Explanation  Response: Verbalizes Understanding    Mobility Training, taught by Violet Khanna PT at 12/8/2024  1:18 PM.  Learner: Patient  Readiness: Acceptance  Method: Explanation  Response: Verbalizes Understanding    Education Comments  No comments found.

## 2024-12-08 NOTE — CARE PLAN
Problem: Pain - Adult  Goal: Verbalizes/displays adequate comfort level or baseline comfort level  Outcome: Progressing     Problem: Safety - Adult  Goal: Free from fall injury  Outcome: Progressing     Problem: Discharge Planning  Goal: Discharge to home or other facility with appropriate resources  Outcome: Progressing     Problem: Chronic Conditions and Co-morbidities  Goal: Patient's chronic conditions and co-morbidity symptoms are monitored and maintained or improved  Outcome: Progressing     Problem: Skin  Goal: Decreased wound size/increased tissue granulation at next dressing change  Outcome: Progressing  Goal: Participates in plan/prevention/treatment measures  Outcome: Progressing  Goal: Prevent/manage excess moisture  Outcome: Progressing  Goal: Prevent/minimize sheer/friction injuries  Outcome: Progressing  Goal: Promote/optimize nutrition  Outcome: Progressing  Goal: Promote skin healing  Outcome: Progressing   The patient's goals for the shift include Jass sleep    The clinical goals for the shift include Patient will remain free of all pain and discomforts until the end of the shift

## 2024-12-08 NOTE — PROGRESS NOTES
Occupational Therapy    Evaluation    Patient Name: Chris Monique  MRN: [id number]  Department: [facility] A 5  Room: [facility]504  Today's Date: 12/8/2024  Time Calculation  Start Time: 1101  Stop Time: 1116  Time Calculation (min): 15 min    Assessment  IP OT Assessment  OT Assessment: Pt presenting with overall decreased endurance and strength resulting in inability to partcipate safely in ADLs/IADLs. Pt requires a moderate intensity rehabilitation level in order to regain PLOF.  Prognosis: Good  Evaluation/Treatment Tolerance: Patient limited by pain, Patient limited by fatigue  Plan:  Treatment Interventions: ADL retraining, Functional transfer training, UE strengthening/ROM, Endurance training, Compensatory technique education  OT Frequency: 3 times per week  OT Discharge Recommendations: Moderate intensity level of continued care  Equipment Recommended upon Discharge: Wheeled walker  OT Recommended Transfer Status: Assist of 1, Minimal assist  OT - OK to Discharge: Yes (Per OT POC)    Subjective   Current Problem:  1. Difficulty in walking        2. Edema of left lower extremity  Vascular US lower extremity venous duplex left    Vascular US lower extremity venous duplex left      3. Chronic bilateral low back pain without sciatica          General:  General  Reason for Referral: presented emergency department with complaints of fall and weakness, s/p L2-L3, L4-L5 lumbar spine laminectomy and fusion performed by Dr Jo on 11/22/24.  Referred By: Kristen Allred MD  Past Medical History Relevant to Rehab:   Past Medical History:   Diagnosis Date    Aortic atherosclerosis (CMS-HCC)     without abdominal aortic aneurysm, vascular US 5/3/23    Cervical spondylosis with myelopathy     HLD (hyperlipidemia)     HTN (hypertension)     Low back pain     Lumbar stenosis with neurogenic claudication     Spinal cord compression (Multi)        Patient Position Received: Bed, 3 rail up  Preferred Learning Style: verbal,  "kinesthetic  General Comment: Pt agreeable to OT evaluation.  Precautions:  Spine       Vital Sign (Past 2hrs)        Date/Time Vitals Session Patient Position Pulse Resp SpO2 BP MAP (mmHg)    12/08/24 1115 --  --  87  18  99 %  155/89  111                     Pain:  Pain Assessment  Pain Assessment: 0-10  0-10 (Numeric) Pain Score: 3  Pain Type: Acute pain  Pain Location: Back    Objective   Cognition:  Overall Cognitive Status: Within Functional Limits    Home Living:  Type of Home: House  Lives With: Spouse  Home Adaptive Equipment: Walker rolling or standard, Reacher  Home Layout: Two level, 1/2 bath on main level, Bed/bath upstairs  Bathroom Toilet: Adaptive toilet seating, Other (Comment) (Elevated toilet seat with grab bars)  Bathroom Equipment: Raised toilet seat with rails   Prior Function:  Level of [place]: Independent with ADLs and functional transfers, Independent with homemaking with ambulation  Receives Help From: Family  ADL Assistance: Independent  Homemaking Assistance: Independent  Ambulatory Assistance: Independent  Prior Function Comments: Pt reports prior to spine surgery, IND in ADLs/IADLs. Since spine surgery, requires assistance for all IADLs, PRN assist for ADLs. Uses [employer]. One fall where pt reports his \"legs gave out.\"     ADL:  Eating Assistance: Independent  Grooming Assistance: Minimal  Bathing Assistance: Moderate  UE Dressing Assistance: Stand by  LE Dressing Assistance: Moderate  Toileting Assistance with Device: Moderate    Activity Tolerance:  Endurance: Tolerates 10 - 20 min exercise with multiple rests    Bed Mobility/Transfers: Bed Mobility  Bed Mobility: Yes  Bed Mobility 1  Bed Mobility 1: Supine to sitting  Level of Assistance 1: Minimum assistance  Bed Mobility Comments 1: VS for log roll technique  Bed Mobility 2  Bed Mobility  2: Sitting to supine  Level of Assistance 2: Minimum assistance  Bed Mobility Comments 2: VS for log roll technique    Functional " Mobility:  Functional Mobility  Functional Mobility Performed: Yes  Functional Mobility 1  Surface 1: Level tile  Device 1: Rolling walker  Assistance 1: Minimum assistance    Sitting Balance:  Static Sitting Balance  Static Sitting-Balance Support: Bilateral upper extremity supported  Static Sitting-Level of Assistance: Close supervision    Extremities: RUE   RUE : Within Functional Limits and LUE   LUE: Within Functional Limits      Outcome Measures: [facility] Daily Activity  Putting on and taking off regular lower body clothing: A lot  Bathing (including washing, rinsing, drying): A lot  Putting on and taking off regular upper body clothing: A little  Toileting, which includes using toilet, bedpan or urinal: A lot  Taking care of personal grooming such as brushing teeth: A little  Eating Meals: None  Daily Activity - Total Score: 16      Education Documentation  Home Exercise Program, taught by Josette Maravilla OT at 12/8/2024 11:37 AM.  Learner: Patient  Readiness: Acceptance  Method: Explanation, Demonstration  Response: Verbalizes Understanding    ADL Training, taught by Josette Maravilla OT at 12/8/2024 11:37 AM.  Learner: Patient  Readiness: Acceptance  Method: Explanation, Demonstration  Response: Verbalizes Understanding    Body Mechanics, taught by Josette Maravilla OT at 12/8/2024 11:37 AM.  Learner: Patient  Readiness: Acceptance  Method: Explanation, Demonstration  Response: Verbalizes Understanding    Precautions, taught by Josette Maravilla OT at 12/8/2024 11:37 AM.  Learner: Patient  Readiness: Acceptance  Method: Explanation, Demonstration  Response: Verbalizes Understanding    Goals:   Encounter Problems       Encounter Problems (Active)       ADLs       Patient will perform UB and LB bathing  with modified independent level of assistance.       Start:  12/08/24    Expected End:  12/22/24            Patient with complete lower body dressing with stand by assist level of assistance donning and doffing all LE clothes  while edge of bed        Start:  12/08/24    Expected End:  12/22/24            Patient will complete daily grooming tasks with stand by assist level of assistance and PRN adaptive equipment while standing.       Start:  12/08/24    Expected End:  12/22/24            Patient will complete toileting including hygiene clothing management/hygiene with stand by assist level of assistance.       Start:  12/08/24    Expected End:  12/22/24               MOBILITY       Patient will perform Functional mobility Household distances/Community Distances with supervision level of assistance and front wheeled walker in order to improve safety and functional mobility.       Start:  12/08/24    Expected End:  12/22/24               TRANSFERS       Patient will perform bed mobility independent level of assistance in order to improve safety and independence with mobility       Start:  12/08/24    Expected End:  12/22/24            Patient will complete functional transfer with front wheeled walker with supervision level of assistance.       Start:  12/08/24    Expected End:  12/22/24

## 2024-12-08 NOTE — PROGRESS NOTES
Chris Monique is a 74 y.o. male on day 2 of admission presenting with Difficulty in walking.      Subjective   Sitting up in chair, pain controlled, on room air, afebrile, no overnight events reported.        Objective     Last Recorded Vitals  /75 (BP Location: Left arm, Patient Position: Sitting)   Pulse 83   Temp 36.9 °C (98.4 °F) (Temporal)   Resp 18   Wt 102 kg (225 lb)   SpO2 98%   Intake/Output last 3 Shifts:    Intake/Output Summary (Last 24 hours) at 12/8/2024 0842  Last data filed at 12/8/2024 0341  Gross per 24 hour   Intake 360 ml   Output 800 ml   Net -440 ml       Admission Weight  Weight: 102 kg (225 lb) (12/06/24 1730)    Daily Weight  12/06/24 : 102 kg (225 lb)    Image Results  Vascular US lower extremity venous duplex left  Narrative: Interpreted By:  Samy Retana,   STUDY:  [facility] US LOWER EXTREMITY VENOUS DUPLEX LEFT;  12/7/2024 12:48 am      INDICATION:  Signs/Symptoms:pt with recent spine surgery, has been sitting in a  recliner most of the time since surgery 11/22. LLE swelling.      ,R60.0 Localized edema      COMPARISON:  None.      ACCESSION NUMBER(S):  [id number]      ORDERING CLINICIAN:  CHARLI ALSTON      TECHNIQUE:  Vascular ultrasound of the left lower extremity was performed.  Real-time compression views as well as Gray scale, color Doppler and  spectral Doppler waveform analysis was performed.      FINDINGS:  Evaluation of the visualized portions of the left common femoral  vein, proximal, mid, and distal femoral vein, and popliteal vein were  performed.  Evaluation of the visualized portions of the  posterior  tibial and peroneal veins were also performed.      The evaluated veins demonstrate normal compressibility. There is  intact venous flow demonstrating normal respiratory variability and  normal augmentation of flow with calf compression. Therefore, there  is no ultrasonographic evidence for deep vein thrombosis within the  evaluated veins.          Impression: No  sonographic evidence for deep vein thrombosis within the evaluated  veins of the left lower extremity.      MACRO:  None      Signed by: Samy Retana 12/7/2024 1:35 AM  Dictation workstation:   [id number]      Physical Exam  Constitutional:       Appearance: Normal appearance.   Cardiovascular:      Rate and Rhythm: Normal rate and regular rhythm.   Pulmonary:      Effort: Pulmonary effort is normal.      Breath sounds: Normal breath sounds.   Abdominal:      General: Abdomen is flat. Bowel sounds are normal.      Palpations: Abdomen is soft.   Musculoskeletal:      Cervical back: Normal range of motion.   Skin:     General: Skin is warm.   Neurological:      General: No focal deficit present.      Mental Status: He is alert and oriented to person, place, and time. Mental status is at baseline.   Psychiatric:         Mood and Affect: Mood normal.         Behavior: Behavior normal.         Thought Content: Thought content normal.         Judgment: Judgment normal.         Relevant Results        Assessment & Plan  Difficulty in walking      12/8:  Cont current regimen and f/up pt/ot and CM for rehab/snf placement.       12/7:  S/p laminectomy 11/24 and neck fusion 4/24 reported.   Has been unable to care for himself at home due to pain and mobility limitations.     No significant acute pathology on imaging.     Plan is for analgesia, pt/ot, and likely dc to rehab pending placement.   Home regimen for chronic conditions  Dvt px with lovetoshiax         Sorin Zee MD

## 2024-12-08 NOTE — CARE PLAN
The patient's goals for the shift include Jass sleep    The clinical goals for the shift include pain managment    Over the shift, the patient did not make progress toward the following goals. Barriers to progression include . Recommendations to address these barriers include .

## 2024-12-09 PROCEDURE — 97116 GAIT TRAINING THERAPY: CPT | Mod: GP,CQ | Performed by: PHYSICAL THERAPY ASSISTANT

## 2024-12-09 PROCEDURE — 97110 THERAPEUTIC EXERCISES: CPT | Mod: GP,CQ | Performed by: PHYSICAL THERAPY ASSISTANT

## 2024-12-09 PROCEDURE — 2500000001 HC RX 250 WO HCPCS SELF ADMINISTERED DRUGS (ALT 637 FOR MEDICARE OP): Performed by: HOSPITALIST

## 2024-12-09 PROCEDURE — 99232 SBSQ HOSP IP/OBS MODERATE 35: CPT | Performed by: INTERNAL MEDICINE

## 2024-12-09 PROCEDURE — 2500000002 HC RX 250 W HCPCS SELF ADMINISTERED DRUGS (ALT 637 FOR MEDICARE OP, ALT 636 FOR OP/ED): Performed by: HOSPITALIST

## 2024-12-09 PROCEDURE — 1100000001 HC PRIVATE ROOM DAILY

## 2024-12-09 PROCEDURE — 2500000004 HC RX 250 GENERAL PHARMACY W/ HCPCS (ALT 636 FOR OP/ED): Performed by: HOSPITALIST

## 2024-12-09 PROCEDURE — 2500000005 HC RX 250 GENERAL PHARMACY W/O HCPCS: Performed by: HOSPITALIST

## 2024-12-09 PROCEDURE — G0378 HOSPITAL OBSERVATION PER HR: HCPCS

## 2024-12-09 RX ORDER — ACETAMINOPHEN 500 MG
1000 TABLET ORAL EVERY 8 HOURS PRN
Start: 2024-12-09

## 2024-12-09 RX ORDER — GABAPENTIN 100 MG/1
200 CAPSULE ORAL NIGHTLY
Start: 2024-12-09

## 2024-12-09 RX ADMIN — ACETAMINOPHEN 650 MG: 325 TABLET, FILM COATED ORAL at 20:33

## 2024-12-09 RX ADMIN — LOSARTAN POTASSIUM: 50 TABLET, FILM COATED ORAL at 08:36

## 2024-12-09 RX ADMIN — OXYCODONE HYDROCHLORIDE 5 MG: 5 TABLET ORAL at 15:49

## 2024-12-09 RX ADMIN — DOCUSATE SODIUM 100 MG: 100 CAPSULE, LIQUID FILLED ORAL at 08:36

## 2024-12-09 RX ADMIN — OXYCODONE HYDROCHLORIDE 5 MG: 5 TABLET ORAL at 20:33

## 2024-12-09 RX ADMIN — OXYCODONE HYDROCHLORIDE 5 MG: 5 TABLET ORAL at 06:56

## 2024-12-09 RX ADMIN — DOCUSATE SODIUM 100 MG: 100 CAPSULE, LIQUID FILLED ORAL at 20:33

## 2024-12-09 RX ADMIN — Medication 1000 UNITS: at 08:37

## 2024-12-09 RX ADMIN — Medication 3 MG: at 20:33

## 2024-12-09 RX ADMIN — ENOXAPARIN SODIUM 40 MG: 40 INJECTION SUBCUTANEOUS at 08:37

## 2024-12-09 RX ADMIN — ACETAMINOPHEN 650 MG: 325 TABLET, FILM COATED ORAL at 06:56

## 2024-12-09 RX ADMIN — GABAPENTIN 200 MG: 100 CAPSULE ORAL at 20:33

## 2024-12-09 RX ADMIN — AMLODIPINE BESYLATE 10 MG: 10 TABLET ORAL at 08:36

## 2024-12-09 RX ADMIN — ACETAMINOPHEN 650 MG: 325 TABLET, FILM COATED ORAL at 15:51

## 2024-12-09 RX ADMIN — TAMSULOSIN HYDROCHLORIDE 0.4 MG: 0.4 CAPSULE ORAL at 08:36

## 2024-12-09 SDOH — ECONOMIC STABILITY: HOUSING INSECURITY: AT ANY TIME IN THE PAST 12 MONTHS, WERE YOU HOMELESS OR LIVING IN A SHELTER (INCLUDING NOW)?: NO

## 2024-12-09 SDOH — SOCIAL STABILITY: SOCIAL INSECURITY: ARE YOU MARRIED, WIDOWED, DIVORCED, SEPARATED, NEVER MARRIED, OR LIVING WITH A PARTNER?: MARRIED

## 2024-12-09 SDOH — ECONOMIC STABILITY: HOUSING INSECURITY: IN THE LAST 12 MONTHS, WAS THERE A TIME WHEN YOU WERE NOT ABLE TO PAY THE MORTGAGE OR RENT ON TIME?: NO

## 2024-12-09 SDOH — ECONOMIC STABILITY: FOOD INSECURITY: HOW HARD IS IT FOR YOU TO PAY FOR THE VERY BASICS LIKE FOOD, HOUSING, MEDICAL CARE, AND HEATING?: NOT VERY HARD

## 2024-12-09 SDOH — ECONOMIC STABILITY: TRANSPORTATION INSECURITY: IN THE PAST 12 MONTHS, HAS LACK OF TRANSPORTATION KEPT YOU FROM MEDICAL APPOINTMENTS OR FROM GETTING MEDICATIONS?: NO

## 2024-12-09 ASSESSMENT — COGNITIVE AND FUNCTIONAL STATUS - GENERAL
DRESSING REGULAR LOWER BODY CLOTHING: A LITTLE
TOILETING: A LOT
TURNING FROM BACK TO SIDE WHILE IN FLAT BAD: A LOT
WALKING IN HOSPITAL ROOM: A LITTLE
CLIMB 3 TO 5 STEPS WITH RAILING: A LITTLE
EATING MEALS: A LOT
WALKING IN HOSPITAL ROOM: A LITTLE
MOVING FROM LYING ON BACK TO SITTING ON SIDE OF FLAT BED WITH BEDRAILS: A LOT
STANDING UP FROM CHAIR USING ARMS: A LOT
MOVING TO AND FROM BED TO CHAIR: A LOT
MOVING FROM LYING ON BACK TO SITTING ON SIDE OF FLAT BED WITH BEDRAILS: A LOT
PERSONAL GROOMING: A LOT
HELP NEEDED FOR BATHING: A LOT
TOILETING: A LOT
DAILY ACTIVITIY SCORE: 13
EATING MEALS: A LOT
MOVING TO AND FROM BED TO CHAIR: A LITTLE
MOBILITY SCORE: 16
DRESSING REGULAR UPPER BODY CLOTHING: A LOT
CLIMB 3 TO 5 STEPS WITH RAILING: TOTAL
PERSONAL GROOMING: A LOT
DRESSING REGULAR LOWER BODY CLOTHING: A LITTLE
HELP NEEDED FOR BATHING: A LOT
STANDING UP FROM CHAIR USING ARMS: A LITTLE
TURNING FROM BACK TO SIDE WHILE IN FLAT BAD: A LOT
CLIMB 3 TO 5 STEPS WITH RAILING: A LITTLE
STANDING UP FROM CHAIR USING ARMS: A LOT
DRESSING REGULAR UPPER BODY CLOTHING: A LOT
DAILY ACTIVITIY SCORE: 13
MOVING TO AND FROM BED TO CHAIR: A LOT
MOBILITY SCORE: 14
MOVING FROM LYING ON BACK TO SITTING ON SIDE OF FLAT BED WITH BEDRAILS: A LITTLE
TURNING FROM BACK TO SIDE WHILE IN FLAT BAD: A LITTLE
WALKING IN HOSPITAL ROOM: A LITTLE
MOBILITY SCORE: 14

## 2024-12-09 ASSESSMENT — PAIN SCALES - PAIN ASSESSMENT IN ADVANCED DEMENTIA (PAINAD)
BODYLANGUAGE: RELAXED
BREATHING: NORMAL
FACIALEXPRESSION: SMILING OR INEXPRESSIVE

## 2024-12-09 ASSESSMENT — PAIN - FUNCTIONAL ASSESSMENT
PAIN_FUNCTIONAL_ASSESSMENT: 0-10

## 2024-12-09 ASSESSMENT — PAIN SCALES - GENERAL
PAINLEVEL_OUTOF10: 7
PAINLEVEL_OUTOF10: 5 - MODERATE PAIN
PAINLEVEL_OUTOF10: 7
PAINLEVEL_OUTOF10: 3
PAINLEVEL_OUTOF10: 7
PAINLEVEL_OUTOF10: 6

## 2024-12-09 ASSESSMENT — PAIN DESCRIPTION - ORIENTATION: ORIENTATION: LOWER

## 2024-12-09 ASSESSMENT — ACTIVITIES OF DAILY LIVING (ADL): LACK_OF_TRANSPORTATION: NO

## 2024-12-09 ASSESSMENT — PAIN DESCRIPTION - LOCATION: LOCATION: BACK

## 2024-12-09 NOTE — PROGRESS NOTES
12/09/24 0809   Discharge Planning   Living Arrangements Spouse/significant other   Support Systems Spouse/significant other   Type of Residence Private residence   Do you have animals or pets at home? No   Who is requesting discharge planning? Patient   Home or Post Acute Services None   Expected Discharge Disposition SNF   Does the patient need discharge transport arranged? Yes   RoundTrip coordination needed? Yes   Has discharge transport been arranged? No   Financial Resource Strain   How hard is it for you to pay for the very basics like food, housing, medical care, and heating? Not very   Housing Stability   In the last 12 months, was there a time when you were not able to pay the mortgage or rent on time? N   At any time in the past 12 months, were you homeless or living in a shelter (including now)? N   Transportation Needs   In the past 12 months, has lack of transportation kept you from medical appointments or from getting medications? no   In the past 12 months, has lack of transportation kept you from meetings, work, or from getting things needed for daily living? No   Patient Choice   Provider Choice list and CMS website (https://medicare.gov/care-compare#search) for post-acute Quality and Resource Measure Data were provided and reviewed with: Patient     I met with this patient at his bedside he is alertx3 at his baseline he is independent with his ADL's lives at home with his wife, he does use a walker , no home o2. PT/OT both rec'd Mod on 12/8, I have choiced the patient for SNF, and will have SW follow up with him for referrals to be sent out, patient will need auth for placement, I will continue to monitor for discharge planning.

## 2024-12-09 NOTE — PROGRESS NOTES
Physical Therapy    Physical Therapy Treatment    Patient Name: Chris Monique  MRN: [id number]  Department: [place] [address]  Room: [address] [place] [address]  Today's Date: 12/9/2024  Time Calculation  Start Time: 1435  Stop Time: 1503  Time Calculation (min): 28 min         Assessment/Plan      PT Plan  Inpatient/Swing Bed or Outpatient: Inpatient  PT Plan  Treatment/Interventions: Bed mobility, Transfer training, Gait training, Therapeutic activity, Therapeutic exercise  PT Plan: Ongoing PT  PT Frequency: 3 times per week  PT Discharge Recommendations: Moderate intensity level of continued care  PT Recommended Transfer Status: Assist x1  PT - OK to Discharge: Yes (per PT POC)      General Visit Information:   PT  Visit  PT Received On: 12/09/24  General  Reason for Referral: presented emergency department with complaints of fall and weakness, s/p L2-L3, L4-L5 lumbar spine laminectomy and fusion performed by Dr Jo on 11/22/24.  Referred By: Kristen Allred MD  Prior to Session Communication: Bedside nurse  Patient Position Received: Up in chair, Alarm on  Preferred Learning Style: verbal, kinesthetic  General Comment:  (pt agreeable to tx.)    Subjective   Precautions:  Precautions  Medical Precautions: Fall precautions  Post-Surgical Precautions: Spinal precautions    Vital Signs (Past 2hrs)        Date/Time Vitals Session Patient Position Pulse Resp SpO2 BP MAP (mmHg)    12/09/24 1609 --  --  86  17  99 %  121/74  90                         Objective   Pain:  Pain Assessment  Pain Assessment: 0-10  0-10 (Numeric) Pain Score: 6 (RN aware)  Pain Type: Surgical pain  Pain Location: Back  Cognition:  Cognition  Orientation Level: Oriented X4  Coordination:     Postural Control:     Extremity/Trunk Assessments:    Activity Tolerance:     Treatments:  Therapeutic Exercise  Therapeutic Exercise Performed: Yes (pt performed seated ther ex:heel raises,LAQ,GS 15-20 reps with BLE's cues for proper breathing and technique.)               Ambulation/Gait Training  Ambulation/Gait Training Performed: Yes  Ambulation/Gait Training 1  Surface 1: Level tile  Device 1: Rolling walker  Assistance 1: Minimum assistance  Quality of Gait 1: Narrow base of support, Inconsistent stride length, Decreased step length, Antalgic, Forward flexed posture  Comments/Distance (ft) 1: 55ftx2 (pt demo slwo short step through pattern cues for proper FWW placement and sequencing. slow mobility,unsteady with turns at times.)  Transfers  Transfer: Yes  Transfer 1  Transfer From 1: Sit to, Stand to  Technique 1: Sit to stand, Stand to sit  Transfer Device 1: Walker  Trials/Comments 1:  (pt  req min cues for proper hand placement and sequencing.)         Outcome Measures:  [facility] Basic Mobility  Turning from your back to your side while in a flat bed without using bedrails: A little  Moving from lying on your back to sitting on the side of a flat bed without using bedrails: A little  Moving to and from bed to chair (including a wheelchair): A little  Standing up from a chair using your arms (e.g. wheelchair or bedside chair): A little  To walk in hospital room: A little  Climbing 3-5 steps with railing: Total  Basic Mobility - Total Score: 16    Education Documentation  Handouts, taught by Carson Hartman PTA at 12/9/2024  4:24 PM.  Learner: Patient  Readiness: Acceptance  Method: Explanation  Response: Needs Reinforcement    Precautions, taught by Carson Hartman PTA at 12/9/2024  4:24 PM.  Learner: Patient  Readiness: Acceptance  Method: Explanation  Response: Needs Reinforcement    Body Mechanics, taught by Carson Hartman PTA at 12/9/2024  4:24 PM.  Learner: Patient  Readiness: Acceptance  Method: Explanation  Response: Needs Reinforcement    Home Exercise Program, taught by Carson Hartman PTA at 12/9/2024  4:24 PM.  Learner: Patient  Readiness: Acceptance  Method: Explanation  Response: Needs Reinforcement    Mobility Training, taught by  Carson Hartman, PTA at 12/9/2024  4:24 PM.  Learner: Patient  Readiness: Acceptance  Method: Explanation  Response: Needs Reinforcement    Education Comments  No comments found.        OP EDUCATION:       Encounter Problems       Encounter Problems (Active)       Balance       complete all mobility with normal balance while dual tasking, negotiating in a dynamic environment, carrying items, etc., with proactive and reactive static and dynamic standing and sitting tasks, with mod I and RW, >15 minutes.         Start:  12/08/24    Expected End:  12/22/24               Mobility       STG - Patient will ambulate 150 ft mod I with a RW.        Start:  12/08/24    Expected End:  12/22/24            STG - Patient will ascend and descend 7 stairs mod I using unilateral HR and LRAD.        Start:  12/08/24    Expected End:  12/22/24               PT Transfers       STG - Patient will perform bed mobility independently using log roll technique.  (Progressing)       Start:  12/08/24    Expected End:  12/22/24            STG - Patient will transfer sit to and from stand mod I with a RW (Progressing)       Start:  12/08/24    Expected End:  12/22/24               Pain - Adult          Safety       LTG - Patient will adhere to spinal precautions during ADL's and transfers independently.        Start:  12/08/24    Expected End:  12/22/24

## 2024-12-09 NOTE — PROGRESS NOTES
12/09/24 1347   Discharge Planning   Expected Discharge Disposition SNF     Met with patient at bedside. Obtained SNF choice- [facility] [place]    Addendum: Per facility they are OON with patient's insurance. SW will follow up on additional choices.    Patient provided additional choices- [facility], [facility], and [facility]    Addendum: SW and patient discussed accepting facilities. [facility] identified as [facility]. Auth started.

## 2024-12-09 NOTE — CARE PLAN
The patient's goals for the shift include Jass sleep    The clinical goals for the shift include Patient will remain free of all pain and discomforts until the end of the shift    Over the shift, the patient did not make progress toward the following goals. Barriers to progression include . Recommendations to address these barriers include .

## 2024-12-09 NOTE — PROGRESS NOTES
Chris Monique is a 74 y.o. male on day 3 of admission presenting with Difficulty in walking.      Subjective   Feels well, pain controlled, no overnight events reported. Waiting for snf.        Objective     Last Recorded Vitals  /84 (BP Location: Right arm, Patient Position: Sitting)   Pulse 77   Temp 37 °C (98.6 °F) (Temporal)   Resp 17   Wt 102 kg (225 lb)   SpO2 99%   Intake/Output last 3 Shifts:    Intake/Output Summary (Last 24 hours) at 12/9/2024 0802  Last data filed at 12/8/2024 2027  Gross per 24 hour   Intake 920 ml   Output 275 ml   Net 645 ml       Admission Weight  Weight: 102 kg (225 lb) (12/06/24 1730)    Daily Weight  12/06/24 : 102 kg (225 lb)    Image Results  Vascular US lower extremity venous duplex left  Narrative: Interpreted By:  Samy Retana,   STUDY:  [facility] US LOWER EXTREMITY VENOUS DUPLEX LEFT;  12/7/2024 12:48 am      INDICATION:  Signs/Symptoms:pt with recent spine surgery, has been sitting in a  recliner most of the time since surgery 11/22. LLE swelling.      ,R60.0 Localized edema      COMPARISON:  None.      ACCESSION NUMBER(S):  [id number]      ORDERING CLINICIAN:  CHARLI ALSTON      TECHNIQUE:  Vascular ultrasound of the left lower extremity was performed.  Real-time compression views as well as Gray scale, color Doppler and  spectral Doppler waveform analysis was performed.      FINDINGS:  Evaluation of the visualized portions of the left common femoral  vein, proximal, mid, and distal femoral vein, and popliteal vein were  performed.  Evaluation of the visualized portions of the  posterior  tibial and peroneal veins were also performed.      The evaluated veins demonstrate normal compressibility. There is  intact venous flow demonstrating normal respiratory variability and  normal augmentation of flow with calf compression. Therefore, there  is no ultrasonographic evidence for deep vein thrombosis within the  evaluated veins.          Impression: No sonographic evidence  for deep vein thrombosis within the evaluated  veins of the left lower extremity.      MACRO:  None      Signed by: Samy Retana 12/7/2024 1:35 AM  Dictation workstation:   [id number]      Physical Exam  Constitutional:       Appearance: Normal appearance.   Cardiovascular:      Rate and Rhythm: Normal rate and regular rhythm.   Pulmonary:      Effort: Pulmonary effort is normal.      Breath sounds: Normal breath sounds.   Abdominal:      General: Abdomen is flat. Bowel sounds are normal.      Palpations: Abdomen is soft.   Musculoskeletal:      Cervical back: Normal range of motion.   Skin:     General: Skin is warm.   Neurological:      General: No focal deficit present.      Mental Status: He is alert and oriented to person, place, and time. Mental status is at baseline.   Psychiatric:         Mood and Affect: Mood normal.         Behavior: Behavior normal.         Thought Content: Thought content normal.         Judgment: Judgment normal.         Relevant Results        Assessment & Plan  Difficulty in walking      12/9:  Cont regimen and dc to snf pending placement.       12/8:  Cont current regimen and f/up pt/ot and CM for rehab/snf placement.       12/7:  S/p laminectomy 11/24 and neck fusion 4/24 reported.   Has been unable to care for himself at home due to pain and mobility limitations.     No significant acute pathology on imaging.     Plan is for analgesia, pt/ot, and likely dc to rehab pending placement.   Home regimen for chronic conditions  Dvt px with lovenox         Sorin Zee MD

## 2024-12-10 DIAGNOSIS — M48.062 LUMBAR STENOSIS WITH NEUROGENIC CLAUDICATION: ICD-10-CM

## 2024-12-10 PROCEDURE — 99232 SBSQ HOSP IP/OBS MODERATE 35: CPT | Performed by: INTERNAL MEDICINE

## 2024-12-10 PROCEDURE — G0378 HOSPITAL OBSERVATION PER HR: HCPCS

## 2024-12-10 PROCEDURE — 2500000004 HC RX 250 GENERAL PHARMACY W/ HCPCS (ALT 636 FOR OP/ED): Performed by: HOSPITALIST

## 2024-12-10 PROCEDURE — 2500000005 HC RX 250 GENERAL PHARMACY W/O HCPCS: Performed by: HOSPITALIST

## 2024-12-10 PROCEDURE — 97535 SELF CARE MNGMENT TRAINING: CPT | Mod: GO

## 2024-12-10 PROCEDURE — 2500000001 HC RX 250 WO HCPCS SELF ADMINISTERED DRUGS (ALT 637 FOR MEDICARE OP): Performed by: HOSPITALIST

## 2024-12-10 PROCEDURE — 1100000001 HC PRIVATE ROOM DAILY

## 2024-12-10 PROCEDURE — 2500000002 HC RX 250 W HCPCS SELF ADMINISTERED DRUGS (ALT 637 FOR MEDICARE OP, ALT 636 FOR OP/ED): Performed by: HOSPITALIST

## 2024-12-10 RX ADMIN — ACETAMINOPHEN 650 MG: 325 TABLET, FILM COATED ORAL at 04:46

## 2024-12-10 RX ADMIN — OXYCODONE HYDROCHLORIDE 5 MG: 5 TABLET ORAL at 20:15

## 2024-12-10 RX ADMIN — LOSARTAN POTASSIUM: 50 TABLET, FILM COATED ORAL at 08:51

## 2024-12-10 RX ADMIN — GABAPENTIN 200 MG: 100 CAPSULE ORAL at 20:14

## 2024-12-10 RX ADMIN — AMLODIPINE BESYLATE 10 MG: 10 TABLET ORAL at 08:51

## 2024-12-10 RX ADMIN — OXYCODONE HYDROCHLORIDE 5 MG: 5 TABLET ORAL at 04:46

## 2024-12-10 RX ADMIN — Medication 3 MG: at 20:15

## 2024-12-10 RX ADMIN — ACETAMINOPHEN 650 MG: 325 TABLET, FILM COATED ORAL at 20:15

## 2024-12-10 RX ADMIN — Medication 1000 UNITS: at 08:51

## 2024-12-10 RX ADMIN — ENOXAPARIN SODIUM 40 MG: 40 INJECTION SUBCUTANEOUS at 08:51

## 2024-12-10 RX ADMIN — ACETAMINOPHEN 650 MG: 325 TABLET, FILM COATED ORAL at 10:26

## 2024-12-10 RX ADMIN — TAMSULOSIN HYDROCHLORIDE 0.4 MG: 0.4 CAPSULE ORAL at 08:51

## 2024-12-10 RX ADMIN — DOCUSATE SODIUM 100 MG: 100 CAPSULE, LIQUID FILLED ORAL at 20:15

## 2024-12-10 RX ADMIN — OXYCODONE HYDROCHLORIDE 5 MG: 5 TABLET ORAL at 10:24

## 2024-12-10 ASSESSMENT — COGNITIVE AND FUNCTIONAL STATUS - GENERAL
DRESSING REGULAR UPPER BODY CLOTHING: A LITTLE
EATING MEALS: A LOT
DAILY ACTIVITIY SCORE: 20
WALKING IN HOSPITAL ROOM: A LITTLE
DAILY ACTIVITIY SCORE: 13
MOBILITY SCORE: 14
DRESSING REGULAR UPPER BODY CLOTHING: A LOT
CLIMB 3 TO 5 STEPS WITH RAILING: A LITTLE
HELP NEEDED FOR BATHING: A LOT
TOILETING: A LITTLE
STANDING UP FROM CHAIR USING ARMS: A LOT
TOILETING: A LOT
DRESSING REGULAR LOWER BODY CLOTHING: A LITTLE
TURNING FROM BACK TO SIDE WHILE IN FLAT BAD: A LOT
DRESSING REGULAR LOWER BODY CLOTHING: A LITTLE
CLIMB 3 TO 5 STEPS WITH RAILING: A LITTLE
PERSONAL GROOMING: A LOT
MOVING TO AND FROM BED TO CHAIR: A LOT
TURNING FROM BACK TO SIDE WHILE IN FLAT BAD: A LOT
DAILY ACTIVITIY SCORE: 13
MOVING FROM LYING ON BACK TO SITTING ON SIDE OF FLAT BED WITH BEDRAILS: A LOT
DRESSING REGULAR UPPER BODY CLOTHING: A LOT
DRESSING REGULAR LOWER BODY CLOTHING: A LITTLE
STANDING UP FROM CHAIR USING ARMS: A LOT
MOVING TO AND FROM BED TO CHAIR: A LOT
HELP NEEDED FOR BATHING: A LITTLE
WALKING IN HOSPITAL ROOM: A LITTLE
HELP NEEDED FOR BATHING: A LOT
TOILETING: A LOT
PERSONAL GROOMING: A LOT
MOVING FROM LYING ON BACK TO SITTING ON SIDE OF FLAT BED WITH BEDRAILS: A LOT
MOBILITY SCORE: 14
EATING MEALS: A LOT

## 2024-12-10 ASSESSMENT — PAIN - FUNCTIONAL ASSESSMENT
PAIN_FUNCTIONAL_ASSESSMENT: 0-10

## 2024-12-10 ASSESSMENT — PAIN SCALES - PAIN ASSESSMENT IN ADVANCED DEMENTIA (PAINAD)
CONSOLABILITY: DISTRACTED OR REASSURED BY VOICE/TOUCH
BODYLANGUAGE: RELAXED
TOTALSCORE: MEDICATION (SEE MAR)
TOTALSCORE: 0
TOTALSCORE: 2
CONSOLABILITY: NO NEED TO CONSOLE
BODYLANGUAGE: TENSE, DISTRESSED PACING, FIDGETING
FACIALEXPRESSION: SMILING OR INEXPRESSIVE
FACIALEXPRESSION: SMILING OR INEXPRESSIVE
TOTALSCORE: MEDICATION (SEE MAR)
BREATHING: NORMAL
BREATHING: NORMAL

## 2024-12-10 ASSESSMENT — ACTIVITIES OF DAILY LIVING (ADL): HOME_MANAGEMENT_TIME_ENTRY: 12

## 2024-12-10 ASSESSMENT — PAIN SCALES - WONG BAKER: WONGBAKER_NUMERICALRESPONSE: HURTS WHOLE LOT

## 2024-12-10 ASSESSMENT — PAIN DESCRIPTION - ORIENTATION
ORIENTATION: LOWER
ORIENTATION: LOWER

## 2024-12-10 ASSESSMENT — PAIN DESCRIPTION - LOCATION
LOCATION: BACK
LOCATION: BACK

## 2024-12-10 ASSESSMENT — PAIN SCALES - GENERAL
PAINLEVEL_OUTOF10: 0 - NO PAIN
PAINLEVEL_OUTOF10: 6
PAINLEVEL_OUTOF10: 8
PAINLEVEL_OUTOF10: 5 - MODERATE PAIN
PAINLEVEL_OUTOF10: 8

## 2024-12-10 NOTE — PROGRESS NOTES
Chris Monique is a 74 y.o. male on day 4 of admission presenting with Difficulty in walking.      Subjective   Feels well, pain controlled, no overnight events reported. Waiting for snf.        Objective     Last Recorded Vitals  /68   Pulse 74   Temp 36.3 °C (97.3 °F) (Temporal)   Resp 18   Wt 102 kg (225 lb)   SpO2 97%   Intake/Output last 3 Shifts:    Intake/Output Summary (Last 24 hours) at 12/10/2024 0921  Last data filed at 12/9/2024 2100  Gross per 24 hour   Intake --   Output 400 ml   Net -400 ml       Admission Weight  Weight: 102 kg (225 lb) (12/06/24 1730)    Daily Weight  12/06/24 : 102 kg (225 lb)    Image Results  Vascular US lower extremity venous duplex left  Narrative: Interpreted By:  Samy Retana,   STUDY:  [facility] US LOWER EXTREMITY VENOUS DUPLEX LEFT;  12/7/2024 12:48 am      INDICATION:  Signs/Symptoms:pt with recent spine surgery, has been sitting in a  recliner most of the time since surgery 11/22. LLE swelling.      ,R60.0 Localized edema      COMPARISON:  None.      ACCESSION NUMBER(S):  [id number]      ORDERING CLINICIAN:  CHARLI ALSTON      TECHNIQUE:  Vascular ultrasound of the left lower extremity was performed.  Real-time compression views as well as Gray scale, color Doppler and  spectral Doppler waveform analysis was performed.      FINDINGS:  Evaluation of the visualized portions of the left common femoral  vein, proximal, mid, and distal femoral vein, and popliteal vein were  performed.  Evaluation of the visualized portions of the  posterior  tibial and peroneal veins were also performed.      The evaluated veins demonstrate normal compressibility. There is  intact venous flow demonstrating normal respiratory variability and  normal augmentation of flow with calf compression. Therefore, there  is no ultrasonographic evidence for deep vein thrombosis within the  evaluated veins.          Impression: No sonographic evidence for deep vein thrombosis within the  evaluated  veins of the left lower extremity.      MACRO:  None      Signed by: Samy Retana 12/7/2024 1:35 AM  Dictation workstation:   [id number]      Physical Exam  Constitutional:       Appearance: Normal appearance.   Cardiovascular:      Rate and Rhythm: Normal rate and regular rhythm.   Pulmonary:      Effort: Pulmonary effort is normal.      Breath sounds: Normal breath sounds.   Abdominal:      General: Abdomen is flat. Bowel sounds are normal.      Palpations: Abdomen is soft.   Musculoskeletal:      Cervical back: Normal range of motion.   Skin:     General: Skin is warm.   Neurological:      General: No focal deficit present.      Mental Status: He is alert and oriented to person, place, and time. Mental status is at baseline.   Psychiatric:         Mood and Affect: Mood normal.         Behavior: Behavior normal.         Thought Content: Thought content normal.         Judgment: Judgment normal.         Relevant Results        Assessment & Plan  Difficulty in walking      12/10:  Snf is pending.   Cont regimen.   Medically ready.       12/9:  Cont regimen and dc to snf pending placement.       12/8:  Cont current regimen and f/up pt/ot and CM for rehab/snf placement.       12/7:  S/p laminectomy 11/24 and neck fusion 4/24 reported.   Has been unable to care for himself at home due to pain and mobility limitations.     No significant acute pathology on imaging.     Plan is for analgesia, pt/ot, and likely dc to rehab pending placement.   Home regimen for chronic conditions  Dvt px with delphine Zee MD

## 2024-12-10 NOTE — PROGRESS NOTES
12/10/24 0938   Discharge Planning   Expected Discharge Disposition SNF     Per notes patient is med ready. [facility] on 12/[facility], PT/OT rec'd [facility] on 12/9, I will continue to monitor for discharge planning.

## 2024-12-10 NOTE — PROGRESS NOTES
Occupational Therapy    Occupational Therapy Treatment    Name: Chris Monique  MRN: [id number]  : 1950  Date: 12/10/24  Room: [address] [place] [address]      Time Calculation  Start Time: 900  Stop Time: 912  Time Calculation (min): 12 min    Assessment:  OT Assessment: Pt progressing towards goals this date, continues to function below baseline for ADLs and functional mobility. Continue to recommend mod intensity therapies at discharge.  Prognosis: Good  Evaluation/Treatment Tolerance: Patient tolerated treatment well  End of Session Communication: Bedside nurse  End of Session Patient Position: Up in chair, Alarm on  Plan:  Treatment Interventions: ADL retraining, Functional transfer training, UE strengthening/ROM, Endurance training, Compensatory technique education  OT Frequency: 3 times per week  OT Discharge Recommendations: Moderate intensity level of continued care  Equipment Recommended upon Discharge: Wheeled walker  OT Recommended Transfer Status: Stand by assist  OT - OK to Discharge: Yes    Subjective   General:  OT Last Visit  OT Received On: 12/10/24  Reason for Referral: presented emergency department with complaints of fall and weakness, s/p L2-L3, L4-L5 lumbar spine laminectomy and fusion performed by Dr Jo on 24.  Past Medical History Relevant to Rehab:   Past Medical History:   Diagnosis Date    Aortic atherosclerosis (CMS-HCC)     without abdominal aortic aneurysm, vascular US 5/3/23    Cervical spondylosis with myelopathy     HLD (hyperlipidemia)     HTN (hypertension)     Low back pain     Lumbar stenosis with neurogenic claudication     Spinal cord compression (Multi)        Prior to Session Communication: Bedside nurse  Patient Position Received: Up in chair, Alarm off, not on at start of session  Family/Caregiver Present: No  General Comment: pt up in chair upon entry, agreeable to therapy   Precautions:  Medical Precautions: Fall precautions  Post-Surgical Precautions: Spinal  precautions    Cognition:  Overall Cognitive Status: Within Functional Limits  Orientation Level: Oriented X4    Pain Assessment:  Pain Assessment  Pain Assessment: 0-10  0-10 (Numeric) Pain Score: 0 - No pain     Objective   Activities of Daily Living:      LE Dressing  LE Dressing: Yes  LE Dressing Where Assessed: Chair  LE Dressing Comments: supervision, increased time needed to complete  Toileting  Toileting Level of Assistance: Distant supervision  Where Assessed: Other (Comment) (chair, use of urinal)    Functional Standing Tolerance:  Functional Mobility  Functional Mobility Performed: Yes  Functional Mobility 1  Surface 1: Level tile  Device 1: Rolling walker  Assistance 1: Close supervision  Comments 1: min household distance completed, heavy reliance on BUE pushing through FWW, VC for upright posture and to lessen weight through hands  Bed Mobility/Transfers:   Bed Mobility  Bed Mobility: No  Transfers  Transfer: Yes  Transfer 1  Technique 1: Sit to stand, Stand to sit  Transfer Device 1: Walker  Transfer Level of Assistance 1: Close supervision  Trials/Comments 1: VC for hand placement and sequencing                Balance:  Dynamic Sitting Balance  Dynamic Sitting-Balance Support: No upper extremity supported  Dynamic Sitting-Level of Assistance: Distant supervision  Dynamic Sitting-Balance:  (LE dressing)  Dynamic Standing Balance  Dynamic Standing-Balance Support: Bilateral upper extremity supported  Dynamic Standing-Level of Assistance: Close supervision  Dynamic Standing-Balance: Turning  Static Sitting Balance  Static Sitting-Balance Support: Feet supported  Static Sitting-Level of Assistance: Modified Independent  Static Standing Balance  Static Standing-Balance Support: Bilateral upper extremity supported  Static Standing-Level of Assistance: Close supervision           Outcome Measures:  [facility] Daily Activity  Putting on and taking off regular lower body clothing: A little  Bathing (including  washing, rinsing, drying): A little  Putting on and taking off regular upper body clothing: A little  Toileting, which includes using toilet, bedpan or urinal: A little  Taking care of personal grooming such as brushing teeth: None  Eating Meals: None  Daily Activity - Total Score: 20     Education Documentation  ADL Training, taught by Catina Malone OT at 12/10/2024  9:41 AM.  Learner: Patient  Readiness: Acceptance  Method: Explanation  Response: Verbalizes Understanding  Comment: spine precautions, LE dressing, safety with mobility with use of FWW    Body Mechanics, taught by Catina Malone OT at 12/10/2024  9:41 AM.  Learner: Patient  Readiness: Acceptance  Method: Explanation  Response: Verbalizes Understanding  Comment: spine precautions, LE dressing, safety with mobility with use of FWW    Precautions, taught by Catina Malone OT at 12/10/2024  9:41 AM.  Learner: Patient  Readiness: Acceptance  Method: Explanation  Response: Verbalizes Understanding  Comment: spine precautions, LE dressing, safety with mobility with use of FWW    Education Comments  No comments found.        Goals:  Encounter Problems       Encounter Problems (Active)       ADLs       Patient will perform UB and LB bathing  with modified independent level of assistance. (Progressing)       Start:  12/08/24    Expected End:  12/22/24            Patient with complete lower body dressing with stand by assist level of assistance donning and doffing all LE clothes while edge of bed  (Progressing)       Start:  12/08/24    Expected End:  12/22/24            Patient will complete daily grooming tasks with stand by assist level of assistance and PRN adaptive equipment while standing. (Progressing)       Start:  12/08/24    Expected End:  12/22/24            Patient will complete toileting including hygiene clothing management/hygiene with stand by assist level of assistance. (Progressing)       Start:  12/08/24    Expected End:  12/22/24                MOBILITY       Patient will perform Functional mobility Household distances/Community Distances with supervision level of assistance and front wheeled walker in order to improve safety and functional mobility. (Progressing)       Start:  12/08/24    Expected End:  12/22/24               TRANSFERS       Patient will perform bed mobility independent level of assistance in order to improve safety and independence with mobility (Progressing)       Start:  12/08/24    Expected End:  12/22/24            Patient will complete functional transfer with front wheeled walker with supervision level of assistance. (Progressing)       Start:  12/08/24    Expected End:  12/22/24                     12/10/24 at 9:42 AM   JOHNNY QUEEN, OT   [phone]

## 2024-12-10 NOTE — CARE PLAN
Problem: Pain - Adult  Goal: Verbalizes/displays adequate comfort level or baseline comfort level  Outcome: Progressing     Problem: Safety - Adult  Goal: Free from fall injury  Outcome: Progressing     Problem: Discharge Planning  Goal: Discharge to home or other facility with appropriate resources  Outcome: Progressing     Problem: Chronic Conditions and Co-morbidities  Goal: Patient's chronic conditions and co-morbidity symptoms are monitored and maintained or improved  Outcome: Progressing     Problem: Skin  Goal: Decreased wound size/increased tissue granulation at next dressing change  Outcome: Progressing  Goal: Participates in plan/prevention/treatment measures  Outcome: Progressing  Goal: Prevent/manage excess moisture  Outcome: Progressing  Goal: Prevent/minimize sheer/friction injuries  Outcome: Progressing  Goal: Promote/optimize nutrition  Outcome: Progressing  Goal: Promote skin healing  Outcome: Progressing   The patient's goals for the shift include Jass sleep    The clinical goals for the shift include Pt will remain free from fall/injury throughout this shift.    Over the shift, the patient did not make progress toward the following goals. Barriers to progression include s/p back surgery. Recommendations to address these barriers include rehab.

## 2024-12-11 ENCOUNTER — APPOINTMENT (OUTPATIENT)
Dept: RADIOLOGY | Facility: CLINIC | Age: 74
End: 2024-12-11
Payer: COMMERCIAL

## 2024-12-11 ENCOUNTER — APPOINTMENT (OUTPATIENT)
Dept: ORTHOPEDIC SURGERY | Facility: CLINIC | Age: 74
End: 2024-12-11
Payer: COMMERCIAL

## 2024-12-11 VITALS
HEIGHT: 71 IN | HEART RATE: 95 BPM | OXYGEN SATURATION: 99 % | WEIGHT: 225 LBS | TEMPERATURE: 97.7 F | SYSTOLIC BLOOD PRESSURE: 125 MMHG | RESPIRATION RATE: 17 BRPM | BODY MASS INDEX: 31.5 KG/M2 | DIASTOLIC BLOOD PRESSURE: 79 MMHG

## 2024-12-11 PROCEDURE — 2500000004 HC RX 250 GENERAL PHARMACY W/ HCPCS (ALT 636 FOR OP/ED): Performed by: HOSPITALIST

## 2024-12-11 PROCEDURE — 2500000002 HC RX 250 W HCPCS SELF ADMINISTERED DRUGS (ALT 637 FOR MEDICARE OP, ALT 636 FOR OP/ED): Performed by: HOSPITALIST

## 2024-12-11 PROCEDURE — 99239 HOSP IP/OBS DSCHRG MGMT >30: CPT | Performed by: STUDENT IN AN ORGANIZED HEALTH CARE EDUCATION/TRAINING PROGRAM

## 2024-12-11 PROCEDURE — G0378 HOSPITAL OBSERVATION PER HR: HCPCS

## 2024-12-11 PROCEDURE — 97116 GAIT TRAINING THERAPY: CPT | Mod: GP,CQ | Performed by: PHYSICAL THERAPY ASSISTANT

## 2024-12-11 PROCEDURE — 2500000001 HC RX 250 WO HCPCS SELF ADMINISTERED DRUGS (ALT 637 FOR MEDICARE OP): Performed by: HOSPITALIST

## 2024-12-11 PROCEDURE — 97530 THERAPEUTIC ACTIVITIES: CPT | Mod: GP,CQ | Performed by: PHYSICAL THERAPY ASSISTANT

## 2024-12-11 RX ADMIN — OXYCODONE HYDROCHLORIDE 5 MG: 5 TABLET ORAL at 07:57

## 2024-12-11 RX ADMIN — LOSARTAN POTASSIUM: 50 TABLET, FILM COATED ORAL at 07:56

## 2024-12-11 RX ADMIN — ENOXAPARIN SODIUM 40 MG: 40 INJECTION SUBCUTANEOUS at 07:59

## 2024-12-11 RX ADMIN — OXYCODONE HYDROCHLORIDE 5 MG: 5 TABLET ORAL at 02:37

## 2024-12-11 RX ADMIN — TAMSULOSIN HYDROCHLORIDE 0.4 MG: 0.4 CAPSULE ORAL at 07:59

## 2024-12-11 RX ADMIN — DOCUSATE SODIUM 100 MG: 100 CAPSULE, LIQUID FILLED ORAL at 07:59

## 2024-12-11 RX ADMIN — POLYETHYLENE GLYCOL 3350 17 G: 17 POWDER, FOR SOLUTION ORAL at 07:59

## 2024-12-11 RX ADMIN — Medication 1000 UNITS: at 07:57

## 2024-12-11 RX ADMIN — AMLODIPINE BESYLATE 10 MG: 10 TABLET ORAL at 07:58

## 2024-12-11 RX ADMIN — ACETAMINOPHEN 650 MG: 325 TABLET, FILM COATED ORAL at 02:37

## 2024-12-11 RX ADMIN — ACETAMINOPHEN 650 MG: 325 TABLET, FILM COATED ORAL at 07:57

## 2024-12-11 ASSESSMENT — COGNITIVE AND FUNCTIONAL STATUS - GENERAL
MOVING TO AND FROM BED TO CHAIR: A LOT
HELP NEEDED FOR BATHING: A LOT
WALKING IN HOSPITAL ROOM: A LITTLE
TURNING FROM BACK TO SIDE WHILE IN FLAT BAD: A LOT
CLIMB 3 TO 5 STEPS WITH RAILING: A LOT
TOILETING: A LITTLE
MOVING TO AND FROM BED TO CHAIR: A LITTLE
DRESSING REGULAR UPPER BODY CLOTHING: A LOT
TURNING FROM BACK TO SIDE WHILE IN FLAT BAD: A LOT
MOBILITY SCORE: 16
DAILY ACTIVITIY SCORE: 15
MOVING FROM LYING ON BACK TO SITTING ON SIDE OF FLAT BED WITH BEDRAILS: A LITTLE
DRESSING REGULAR LOWER BODY CLOTHING: A LITTLE
STANDING UP FROM CHAIR USING ARMS: A LOT
PERSONAL GROOMING: A LOT
EATING MEALS: A LITTLE
MOVING FROM LYING ON BACK TO SITTING ON SIDE OF FLAT BED WITH BEDRAILS: A LOT
STANDING UP FROM CHAIR USING ARMS: A LITTLE
WALKING IN HOSPITAL ROOM: A LITTLE
MOBILITY SCORE: 14
CLIMB 3 TO 5 STEPS WITH RAILING: A LITTLE

## 2024-12-11 ASSESSMENT — PAIN SCALES - GENERAL
PAINLEVEL_OUTOF10: 7
PAINLEVEL_OUTOF10: 3
PAINLEVEL_OUTOF10: 5 - MODERATE PAIN
PAINLEVEL_OUTOF10: 8

## 2024-12-11 ASSESSMENT — PAIN DESCRIPTION - LOCATION: LOCATION: BACK

## 2024-12-11 ASSESSMENT — PAIN - FUNCTIONAL ASSESSMENT
PAIN_FUNCTIONAL_ASSESSMENT: 0-10

## 2024-12-11 NOTE — DISCHARGE SUMMARY
Discharge Diagnosis  Difficulty in walking    Issues Requiring Follow up  Spine dr    Discharge Meds     Medication List      CHANGE how you take these medications     acetaminophen 500 mg tablet; Commonly known as: Tylenol Extra Strength;   Take 2 tablets (1,000 mg) by mouth every 8 hours if needed for mild pain   (1 - 3) or moderate pain (4 - 6).; What changed: when to take this,   reasons to take this     CONTINUE taking these medications     amLODIPine 5 mg tablet; Commonly known as: Norvasc   ascorbic acid 1,000 mg tablet; Commonly known as: Vitamin C   chlorpheniramine 4 mg tablet; Commonly known as: Chlor-Trimeton   cyanocobalamin 500 mcg tablet; Commonly known as: Vitamin B-12   docusate sodium 100 mg capsule; Commonly known as: Colace   gabapentin 100 mg capsule; Commonly known as: Neurontin; Take 2 capsules   (200 mg) by mouth once daily at bedtime.   losartan-hydrochlorothiazide 50-12.5 mg tablet; Commonly known as:   Hyzaar   methocarbamol 500 mg tablet; Commonly known as: Robaxin; Take 1-2 tabs   every 8 hours as needed for spasms   multivitamin tablet   oxyCODONE 5 mg immediate release tablet; Commonly known as: Roxicodone;   Take 1 tablet (5 mg) by mouth every 4 hours if needed for severe pain (7 -   10) for up to 7 days. Do not fill before December 5, 2024.   polyethylene glycol 17 gram/dose powder; Commonly known as: Miralax; Mix   17 g of powder and drink 2 times a day as needed (constipation).   tamsulosin 0.4 mg 24 hr capsule; Commonly known as: Flomax   Vitamin D3 25 MCG (1000 UT) tablet; Generic drug: cholecalciferol   vitamin E 450 mg (1000 unit) capsule       Test Results Pending At Discharge  Pending Labs       No current pending labs.            Hospital Course  This is a very nice 74-year-old male who is status postlaminectomy 11/24 and neck fusion 4/24 who presents for ongoing weakness and mobility limitations.  Patient was seen by physical therapy and Occupational Therapy who recommended  SNF.  However, this was denied by his insurance.  I spoke to the patient and told him that I needed to call for an appeal.  I explained the appeal process to best my ability.  Patient thought about it and decided he did not want a wait for the appeal and he would go home with home health care    Pertinent Physical Exam At Time of Discharge  Physical Exam  Vitals and nursing note reviewed.   Constitutional:       General: He is not in acute distress.     Appearance: Normal appearance. He is not ill-appearing.   HENT:      Head: Normocephalic and atraumatic.   Pulmonary:      Effort: No respiratory distress.   Abdominal:      Tenderness: There is no guarding.   Skin:     Coloration: Skin is not jaundiced or pale.   Neurological:      Mental Status: He is alert and oriented to person, place, and time.   Psychiatric:         Mood and Affect: Mood normal.         Outpatient Follow-Up  No future appointments.      Paul Hutson,     I spent 35 minutes in professional medical decision making, face-to-face examination and counseling, care coordination, chart review, discussions with consultants, and care planning

## 2024-12-11 NOTE — NURSING NOTE

## 2024-12-11 NOTE — CARE PLAN
Problem: Pain - Adult  Goal: Verbalizes/displays adequate comfort level or baseline comfort level  Outcome: Progressing     Problem: Safety - Adult  Goal: Free from fall injury  Outcome: Progressing     Problem: Discharge Planning  Goal: Discharge to home or other facility with appropriate resources  Outcome: Progressing     Problem: Chronic Conditions and Co-morbidities  Goal: Patient's chronic conditions and co-morbidity symptoms are monitored and maintained or improved  Outcome: Progressing     Problem: Skin  Goal: Decreased wound size/increased tissue granulation at next dressing change  Outcome: Progressing  Goal: Participates in plan/prevention/treatment measures  Outcome: Progressing  Goal: Prevent/manage excess moisture  Outcome: Progressing  Goal: Prevent/minimize sheer/friction injuries  Outcome: Progressing  Goal: Promote/optimize nutrition  Outcome: Progressing  Goal: Promote skin healing  Outcome: Progressing   The patient's goals for the shift include Jass sleep    The clinical goals for the shift include Pt will remain free from fall/injury throughout this shift.    Over the shift, the patient did not make progress toward the following goals. Barriers to progression include weakness. Recommendations to address these barriers include PT/OT/rehab.

## 2024-12-11 NOTE — DISCHARGE INSTRUCTIONS
As we discussed, your insurance denied skilled nursing facility. You opted to go home with home healthcare and not wait for the appeal.  Please be careful when getting around at home. Call for help. Good luck and please work with physical therapy as much as you can.     For any [facility] appointment please call [phone].

## 2024-12-11 NOTE — CARE PLAN
Problem: Pain - Adult  Goal: Verbalizes/displays adequate comfort level or baseline comfort level  Outcome: Progressing     Problem: Safety - Adult  Goal: Free from fall injury  Outcome: Progressing     Problem: Discharge Planning  Goal: Discharge to home or other facility with appropriate resources  Outcome: Progressing     Problem: Chronic Conditions and Co-morbidities  Goal: Patient's chronic conditions and co-morbidity symptoms are monitored and maintained or improved  Outcome: Progressing     Problem: Skin  Goal: Decreased wound size/increased tissue granulation at next dressing change  Outcome: Progressing  Goal: Participates in plan/prevention/treatment measures  Outcome: Progressing  Goal: Prevent/manage excess moisture  Outcome: Progressing  Goal: Prevent/minimize sheer/friction injuries  Outcome: Progressing  Goal: Promote/optimize nutrition  Outcome: Progressing  Goal: Promote skin healing  Outcome: Progressing   The patient's goals for the shift include Jass sleep    The clinical goals for the shift include Pt will remain free from fall and injury during shift    Over the shift, the patient did not make progress toward the following goals. Barriers to progression include . Recommendations to address these barriers include .

## 2024-12-11 NOTE — PROGRESS NOTES
12/11/24 0759   Discharge Planning   Expected Discharge Disposition SNF     Clinical updates sent to [facility], auth still pending, auth was escalated yesterday 12/10, once auth is approved patient will be able to discharge to facility, I will continue to monitor for discharge planning.    09:05 I spoke to the patient at his bedside in regards to discharge planning, I did advise him the insurance auth for the SNF was denied and we are looking into an appeal, I did advise him if denied we can set him up with [facility], he stated he did not have a wheeled walker at home, I have reached out to the provider and PT to let them know patient will need a script and walker if he discharges home, I will continue to monitor for discharge planning and home going needs.    14:29: I did speak to the patient's wife April at [phone] to discuss discharge planning, I did let her know that if we did not win the appeal for [facility], we could still discharge the patient home with [facility] and he would still receive PT/OT services at home.

## 2024-12-11 NOTE — HOSPITAL COURSE
This is a very nice 74-year-old male who is status postlaminectomy 11/24 and neck fusion 4/24 who presents for ongoing weakness and mobility limitations.  Patient was seen by physical therapy and Occupational Therapy who recommended SNF.  However, this was denied by his insurance.  I spoke to the patient and told him that I needed to call for an appeal.  I explained the appeal process to best my ability.  Patient thought about it and decided he did not want a wait for the appeal and he would go home with home health care

## 2024-12-11 NOTE — PROGRESS NOTES
Physical Therapy    Physical Therapy Treatment    Patient Name: Chris Monique  MRN: [id number]  Department: [place] [id number]  Room: [address] [place] [address]  Today's Date: 12/11/2024  Time Calculation  Start Time: 1110  Stop Time: 1134  Time Calculation (min): 24 min         Assessment/Plan   PT Assessment  End of Session Communication: Bedside nurse  Assessment Comment:  (pt demo fair + susan to increased activity, safety concern with stair negotiation as pt has multiple stairs to negotiate at home, continues to req some assistance with ambulation also. FALLS RISK)  End of Session Patient Position: Up in chair, Alarm on  PT Plan  Inpatient/Swing Bed or Outpatient: Inpatient  PT Plan  Treatment/Interventions: Bed mobility, Transfer training, Gait training, Stair training, Therapeutic activity  PT Plan: Ongoing PT  PT Frequency: 3 times per week  PT Discharge Recommendations: Moderate intensity level of continued care  PT Recommended Transfer Status: Assist x1  PT - OK to Discharge: Yes (per PT POC)      General Visit Information:   PT  Visit  PT Received On: 12/11/24  General  Reason for Referral: presented emergency department with complaints of fall and weakness, s/p L2-L3, L4-L5 lumbar spine laminectomy and fusion performed by Dr Jo on 11/22/24.  Referred By: Kristen Allred MD  Prior to Session Communication: Bedside nurse  Patient Position Received: Up in chair, Alarm off, not on at start of session  Preferred Learning Style: verbal, kinesthetic  General Comment:  (pt agreeable to tx.)    Subjective   Precautions:  Precautions  Medical Precautions: Fall precautions  Post-Surgical Precautions: Spinal precautions    Vital Signs (Past 2hrs)        Date/Time Vitals Session Patient Position Pulse Resp SpO2 BP MAP (mmHg)    12/11/24 1154 --  --  88  18  98 %  117/68  --                         Objective   Pain:  Pain Assessment  Pain Assessment: 0-10  0-10 (Numeric) Pain Score: 5 - Moderate pain (RN aware)  Pain Type: Acute pain  Pain  Location: Back  Cognition:  Cognition  Orientation Level: Oriented X4  Safety/Judgement: Exceptions to WFL  Impulsive: Mildly  Processing Speed: Delayed  Coordination:     Postural Control:  Static Sitting Balance  Static Sitting-Balance Support: No upper extremity supported  Static Sitting-Level of Assistance: Close supervision  Dynamic Sitting Balance  Dynamic Sitting-Balance Support: Bilateral upper extremity supported  Dynamic Sitting-Level of Assistance: Close supervision  Dynamic Sitting-Balance: Lateral lean, Reaching for objects (within precautions)  Static Standing Balance  Static Standing-Balance Support: Bilateral upper extremity supported  Static Standing-Level of Assistance: Contact guard  Dynamic Standing Balance  Dynamic Standing-Balance Support: Bilateral upper extremity supported  Dynamic Standing-Level of Assistance: Minimum assistance  Dynamic Standing-Balance: Lateral lean, Reaching for objects, Turning (within precautions)  Dynamic Standing-Comments:  (pt slightly unsteady,req cues for proper hand placement and posture to keep precautions)  Extremity/Trunk Assessments:    Activity Tolerance:  Activity Tolerance  Endurance: Decreased tolerance for upright activites  Treatments:                 Ambulation/Gait Training  Ambulation/Gait Training Performed: Yes  Ambulation/Gait Training 1  Surface 1: Level tile  Device 1: Rolling walker  Assistance 1: Minimum assistance  Quality of Gait 1: Narrow base of support, Inconsistent stride length, Decreased step length, Antalgic, Forward flexed posture  Comments/Distance (ft) 1: 65ft x2 with short standing rest periods (pt continues to demo fwd flex posture, slow short step ththrough pattern with increased reminders to lift feet enough to clear floor)  Transfers  Transfer: Yes  Transfer 1  Transfer From 1: Sit to, Stand to  Technique 1: Sit to stand, Stand to sit  Transfer Device 1: Walker  Transfer Level of Assistance 1: Contact guard  Trials/Comments 1:  3 (pt req min cues for proper hand placement and posture to facilitate sequencing.)    Stairs  Stairs: Yes (pt performed 5 steps with 2 rails and Mod A to MAx A to complete safely, unsteady and poor eccentric control on descending.)    Outcome Measures:  [facility] Basic Mobility  Turning from your back to your side while in a flat bed without using bedrails: A little  Moving from lying on your back to sitting on the side of a flat bed without using bedrails: A lot  Moving to and from bed to chair (including a wheelchair): A little  Standing up from a chair using your arms (e.g. wheelchair or bedside chair): A little  To walk in hospital room: A little  Climbing 3-5 steps with railing: A lot  Basic Mobility - Total Score: 16    Education Documentation  Handouts, taught by Carson Hartman PTA at 12/11/2024 11:56 AM.  Learner: Patient  Readiness: Acceptance  Method: Explanation  Response: Needs Reinforcement    Precautions, taught by Carson Hartman PTA at 12/11/2024 11:56 AM.  Learner: Patient  Readiness: Acceptance  Method: Explanation  Response: Needs Reinforcement    Body Mechanics, taught by Carson Hartman PTA at 12/11/2024 11:56 AM.  Learner: Patient  Readiness: Acceptance  Method: Explanation  Response: Needs Reinforcement    Home Exercise Program, taught by Carson Hartman PTA at 12/11/2024 11:56 AM.  Learner: Patient  Readiness: Acceptance  Method: Explanation  Response: Needs Reinforcement    Mobility Training, taught by Carson Hartman PTA at 12/11/2024 11:56 AM.  Learner: Patient  Readiness: Acceptance  Method: Explanation  Response: Needs Reinforcement    Education Comments  No comments found.        OP EDUCATION:       Encounter Problems       Encounter Problems (Active)       Balance       complete all mobility with normal balance while dual tasking, negotiating in a dynamic environment, carrying items, etc., with proactive and reactive static and dynamic standing and sitting  tasks, with mod I and RW, >15 minutes.         Start:  12/08/24    Expected End:  12/22/24               Mobility       STG - Patient will ambulate 150 ft mod I with a RW.        Start:  12/08/24    Expected End:  12/22/24            STG - Patient will ascend and descend 7 stairs mod I using unilateral HR and LRAD.        Start:  12/08/24    Expected End:  12/22/24               PT Transfers       STG - Patient will perform bed mobility independently using log roll technique.  (Progressing)       Start:  12/08/24    Expected End:  12/22/24            STG - Patient will transfer sit to and from stand mod I with a RW (Progressing)       Start:  12/08/24    Expected End:  12/22/24               Pain - Adult          Safety       LTG - Patient will adhere to spinal precautions during ADL's and transfers independently.        Start:  12/08/24    Expected End:  12/22/24

## 2024-12-12 ENCOUNTER — HOME HEALTH ADMISSION (OUTPATIENT)
Dept: HOME HEALTH SERVICES | Facility: HOME HEALTH | Age: 74
End: 2024-12-12
Payer: COMMERCIAL

## 2024-12-12 NOTE — PROGRESS NOTES
12/12/24 0916   Discharge Planning   Expected Discharge Disposition Home H     I received a message that [facility] was not able to accept patient for PT/OT , I have reached out to patient's wife April to let her know we are looking into an outside agency for PT/OT to see who can accept and when SOC can begin.    10:01 faxing over DC paperwork to [facility] who can accept, patient had stated he did not want the [facility] agency but was open to other agencies.

## 2024-12-13 ENCOUNTER — PATIENT OUTREACH (OUTPATIENT)
Dept: CARE COORDINATION | Age: 74
End: 2024-12-13
Payer: COMMERCIAL

## 2024-12-13 NOTE — PROGRESS NOTES
Incoming call from patient's spouse, April. She called to enroll patient. After discussion of the [facility] at Home program, she declines, stating he needs PT, not phone calls. He had  home care prior to hospitalization, provided her with HC contact number and transferred her to .

## 2025-01-06 ENCOUNTER — TELEPHONE (OUTPATIENT)
Dept: ORTHOPEDIC SURGERY | Facility: HOSPITAL | Age: 75
End: 2025-01-06
Payer: COMMERCIAL

## 2025-01-07 DIAGNOSIS — M48.062 SPINAL STENOSIS OF LUMBAR REGION WITH NEUROGENIC CLAUDICATION: Primary | ICD-10-CM

## 2025-01-07 RX ORDER — PREDNISONE 20 MG/1
20 TABLET ORAL DAILY
Qty: 7 TABLET | Refills: 1 | Status: SHIPPED | OUTPATIENT
Start: 2025-01-07

## 2025-01-22 ENCOUNTER — OFFICE VISIT (OUTPATIENT)
Dept: ORTHOPEDIC SURGERY | Facility: CLINIC | Age: 75
End: 2025-01-22
Payer: COMMERCIAL

## 2025-01-22 DIAGNOSIS — M48.062 NEUROGENIC CLAUDICATION DUE TO LUMBAR SPINAL STENOSIS: Primary | ICD-10-CM

## 2025-01-22 PROCEDURE — 1159F MED LIST DOCD IN RCRD: CPT | Performed by: ORTHOPAEDIC SURGERY

## 2025-01-22 PROCEDURE — 99211 OFF/OP EST MAY X REQ PHY/QHP: CPT | Performed by: ORTHOPAEDIC SURGERY

## 2025-01-22 NOTE — PROGRESS NOTES
Patient returns for follow-up.  He is 2 months status post lumbar laminectomy for stenosis and claudication.  For the most part he is doing well.  He missed his first postoperative visit because of the weather, he rescheduled to today with me.  His legs feel wobbly sometimes, but for the most part the pain has gone away.  He is progressing nicely with physical therapy at home, using a cane around the house and is able to go up stairs now.    On exam his incision is well-healed.  No evidence of infection.  Strength is normal in the lower extremities bilaterally.  Negative straight leg raise test.    At this time he is doing very well postoperatively.  I reassured him the progress can sometimes be quite slow, he had severe longstanding stenosis and everything he is describing at this point is normal.  He should continue his home physical therapy program.  When home PT discharges him he should call my office and we will place a referral for outpatient physical therapy.    He will follow-up with us in 2 months for repeat clinical exam.    *This note was dictated using speech recognition software and was not corrected for spelling or grammatical errors*

## 2025-01-27 ENCOUNTER — TELEPHONE (OUTPATIENT)
Dept: ORTHOPEDIC SURGERY | Facility: HOSPITAL | Age: 75
End: 2025-01-27
Payer: COMMERCIAL

## 2025-01-27 NOTE — TELEPHONE ENCOUNTER
Patient called to discuss FMLA paperwork. I completed 2 copies of the form with 2 different time frames per patients request. Both copies are scanned into his chart.

## 2025-02-12 ENCOUNTER — TELEPHONE (OUTPATIENT)
Dept: ORTHOPEDIC SURGERY | Facility: HOSPITAL | Age: 75
End: 2025-02-12
Payer: COMMERCIAL

## 2025-02-12 NOTE — TELEPHONE ENCOUNTER
Patient said the prednisone really helped with his pain. I told him that prednisone is not something we prescribe for long term use. He wants to know if there is anything else he can take.

## 2025-02-13 DIAGNOSIS — M48.062 LUMBAR STENOSIS WITH NEUROGENIC CLAUDICATION: Primary | ICD-10-CM

## 2025-02-13 RX ORDER — MELOXICAM 15 MG/1
15 TABLET ORAL DAILY
Qty: 30 TABLET | Refills: 3 | Status: SHIPPED | OUTPATIENT
Start: 2025-02-13

## 2025-03-19 ENCOUNTER — OFFICE VISIT (OUTPATIENT)
Dept: ORTHOPEDIC SURGERY | Facility: CLINIC | Age: 75
End: 2025-03-19
Payer: COMMERCIAL

## 2025-03-19 DIAGNOSIS — M48.062 SPINAL STENOSIS OF LUMBAR REGION WITH NEUROGENIC CLAUDICATION: ICD-10-CM

## 2025-03-19 DIAGNOSIS — G95.20 SPINAL CORD COMPRESSION (MULTI): Primary | ICD-10-CM

## 2025-03-19 PROCEDURE — 1159F MED LIST DOCD IN RCRD: CPT | Performed by: PHYSICIAN ASSISTANT

## 2025-03-19 PROCEDURE — 99211 OFF/OP EST MAY X REQ PHY/QHP: CPT | Performed by: PHYSICIAN ASSISTANT

## 2025-03-19 PROCEDURE — 1036F TOBACCO NON-USER: CPT | Performed by: PHYSICIAN ASSISTANT

## 2025-03-19 ASSESSMENT — PAIN - FUNCTIONAL ASSESSMENT: PAIN_FUNCTIONAL_ASSESSMENT: 0-10

## 2025-03-19 NOTE — PROGRESS NOTES
Chris is 4 months postop from a L2-5 laminectomy and fusion performed by Dr. Jo on 11/22.    He has continued to recover well from surgery.  He has residual low back pain that he describes as soreness.  He is doing therapy exercises at home.  He has a peddler that he uses every morning.  He is seeing gradual improvement in his walking.  He does mention wanting to restart outpatient physical therapy.    On exam, slow slightly unbalanced gait without assistive devices.  Much more balance with his cane.  Strength intact in the lower extremities bilaterally.  Incision well-healed.    He can continue to increase activities as tolerated without restrictions.  He does not need refills medications today.  He was provided with a referral to start outpatient physical therapy.  I would recommend a heavy focus on balance and gait training.  He will follow-up with me on an as-needed basis.    **This note was dictated using speech recognition software and was not corrected for spelling or grammatical errors**

## 2025-05-19 ENCOUNTER — TELEPHONE (OUTPATIENT)
Dept: ORTHOPEDIC SURGERY | Facility: HOSPITAL | Age: 75
End: 2025-05-19
Payer: COMMERCIAL

## 2025-05-19 DIAGNOSIS — M54.16 LUMBAR RADICULOPATHY: ICD-10-CM

## 2025-05-21 ENCOUNTER — EVALUATION (OUTPATIENT)
Dept: PHYSICAL THERAPY | Facility: CLINIC | Age: 75
End: 2025-05-21
Payer: COMMERCIAL

## 2025-05-21 DIAGNOSIS — M48.062 SPINAL STENOSIS OF LUMBAR REGION WITH NEUROGENIC CLAUDICATION: ICD-10-CM

## 2025-05-21 DIAGNOSIS — G95.20 SPINAL CORD COMPRESSION (MULTI): ICD-10-CM

## 2025-05-21 DIAGNOSIS — R29.898 WEAKNESS OF BOTH LEGS: Primary | ICD-10-CM

## 2025-05-21 DIAGNOSIS — R26.81 UNSTEADINESS ON FEET: ICD-10-CM

## 2025-05-21 PROCEDURE — 97162 PT EVAL MOD COMPLEX 30 MIN: CPT | Mod: GP

## 2025-05-21 ASSESSMENT — ENCOUNTER SYMPTOMS
DEPRESSION: 0
LOSS OF SENSATION IN FEET: 1
OCCASIONAL FEELINGS OF UNSTEADINESS: 1

## 2025-05-21 ASSESSMENT — PAIN SCALES - GENERAL: PAINLEVEL_OUTOF10: 5 - MODERATE PAIN

## 2025-05-21 ASSESSMENT — PAIN - FUNCTIONAL ASSESSMENT: PAIN_FUNCTIONAL_ASSESSMENT: 0-10

## 2025-05-21 NOTE — PROGRESS NOTES
Physical Therapy    Physical Therapy Evaluation    Patient Name: Chris Monique  MRN: [id number]  Today's Date: 5/21/2025    Time Entry:  Time Calculation  Start Time: 1520  Stop Time: 1605  Time Calculation (min): 45 min  PT Evaluation Time Entry  PT Evaluation (Moderate) Time Entry: 45                    Visit #1  Insurance [employer]  Plan; 5/21/2025 - 8/18/2025  Problem List Items Addressed This Visit           ICD-10-CM       Musculoskeletal and Injuries    Weakness of both legs - Primary R29.898    Relevant Orders    Follow Up In Physical Therapy       Neuro    Spinal stenosis of lumbar region with neurogenic claudication M48.062    Relevant Orders    Follow Up In Physical Therapy       Symptoms and Signs    Unsteadiness on feet R26.81    Relevant Orders    Follow Up In Physical Therapy     Other Visit Diagnoses         Codes      Spinal cord compression (Multi)     G95.20    Relevant Orders    Follow Up In Physical Therapy            Assessment  Patient presents with residual weakness of left leg, hx left hip OA, and hx of lumbar and cervical spine surgery due to spinal cord compression. Both surgeries performed in 2024. Patient feels unsteady on feet with reported several falls prior and post surgery. Patient medicated for neuropathic pain in left leg and low back. Patient hopes to improve strength, balance, and be able to walk with his wife x 1 mile in part without fall risk and without need for assistive device. Patient will benefit from skilled PT in improving his functional goals. Patient is motivated and agreeable with discussed POC.     Plan  Treatment/Interventions: Education/ Instruction, Manual therapy, Taping techniques, Therapeutic activities, Therapeutic exercises, Neuromuscular re-education  PT Plan: Skilled PT  Rehab Potential: Good  Plan of Care Agreement: Patient  PT recommended frequency; 1-2 session x 10 sessions     Current Problem  1. Weakness of both legs  Follow Up In Physical Therapy      2.  Spinal cord compression (Multi)  Referral to Physical Therapy    Follow Up In Physical Therapy      3. Spinal stenosis of lumbar region with neurogenic claudication  Referral to Physical Therapy    Follow Up In Physical Therapy      4. Unsteadiness on feet  Follow Up In Physical Therapy          Subjective   General:  General  Reason for Referral: PT eval and treat for weakness and unsteadiness  Referred By: Lexi Barakat PA-C  Past Medical History Relevant to Rehab: Hx of low back and neck pain with stenosis causing cord compression. Spinal decompression was necessary. Cervical spine surgery took place in April and lumbar spine in November of 2024 (Hx of falls reported prior and after spinal surgeries)  General Comment: Currently on Gabapentin twice a day  Precautions: discussed fall risk reduction strategies   Precautions  STEADI Fall Risk Score (The score of 4 or more indicates an increased risk of falling): 13  Vital Signs: NA     Pain:  Pain Assessment: 0-10  0-10 (Numeric) Pain Score: 5 - Moderate pain  Pain Type: Neuropathic pain  Pain Location:  (legs pain, mostly in left leg, and lower back)  Pain Orientation:  (lower back, neck, left leg)  Pain Radiating Towards: Radiating tingling type pain in left leg  Pain Descriptors:  (tingling pain and stabbing pain in left leg)  Pain Frequency: Intermittent  Pain Onset: Gradual  Clinical Progression: Gradually improving  Effect of Pain on Daily Activities: All do all my functions, just feel irritated by pain  Patient's Stated Pain Goal:  (I would like a mile in the park with my wife, without walker or a cane)  Pain Interventions:  (Gabapentin daily, Tylenol for arthritis two a day)  Home Living: lives in single family home with stairs  Lives with wife     Prior Function Per Pt/Caregiver Report: independent with cane, drives, community Ambulator, works as dental tech full time       Objective   Posture:- forward leaning posture   Flat lumbar spine and cervical  spine  Range of Motion: Left hip  Extension 0  Flexion 0-85  Abduction 0-30  ER/IR not tested due to pain     Strength: Left LE  Hip Flexors 5-/5  Hip abductors 4/5  Hip adductors 5/5  Left Quads 5-/5  Left Hamstrings 4-/5  Left Plantarflexors 2/5  Left Dorsiflexors 4/5  Left foot inversion/eversion 3-/5  Right foot inversion/eversion 4/5  Right DF 4/5  Right PF 4/5  Right Quads 5/5  Right Hamstrings 5-/5  Right hip Flexors 5/5  Right hip abd/adductors 5-/5     Flexibility: left hip extension missing  Left Hamstrings and hip Flexors tight     Palpation: WNL     Special Tests: NA     Gait: slow gait at 0.6 meter/sec using cane  No left push off      Balance: fair  Sway evident with feet together (no balance loss)  Modified tandem stance ( sway +/no balance loss)  Feet apart stance with EC (balance loss)  360 degree turn without cane (+balance loss against wall)  TUG = 22 seconds, performed with cane     Stairs: one step at a time with rail assist     Bed Mobility: independent      Transfers: sit/stand from chair with arm assist    Outcome Measures:  Modified Oswestry =  42%    OP EDUCATION:  Outpatient Education  Individual(s) Educated: Patient  Education Provided: Anatomy, Body Mechanics, Fall Risk, Home Exercise Program, POC  Diagnosis and Precautions: Weakness in legs and unsteadiness due to hx of spine compression and cervical and lumbar surgiries  Equipment:  (cane)  Risk and Benefits Discussed with Patient/Caregiver/Other: yes  Patient/Caregiver Demonstrated Understanding: yes  Plan of Care Discussed and Agreed Upon: yes  Patient Response to Education: Patient/Caregiver Verbalized Understanding of Information  Education Comment: POC and HEP configuration    Goals:  Active       PT Problem       PT Goal 1       Start:  05/21/25    Expected End:  08/18/25       Patient will reduce difficulties related to functional mobilities and quality of life, as evident by Modified Oswestry score reduction by 40%         PT  Goal 2       Start:  05/21/25    Expected End:  08/18/25       Patient will report reduced/abolished pain in lower back and left leg, indicated by grade of 0-3 on 0-10 pain scale          PT Goal 3       Start:  05/21/25    Expected End:  08/18/25       Patient will demonstrated improved strength of  left leg, evident by MMT of 4-/5 or better plantar flexion, 4+/5 or better knee flexion, and functionally evident by ease of consecutive sit/stand transfers without arm assist and improved reciprocal stair climbing          PT Goal 4       Start:  05/21/25    Expected End:  08/18/25       Patient will improve gait mechanics, evident by improved balance in left stance, improved left push off from terminal stance, reducing need for cane assistance, and average gait speed of 1 meters/second on level surfaces          PT Goal 5       Start:  05/21/25    Expected End:  08/18/25       Patient will improve tolerance and balance for prolong walking activities, evident by 6 MWT over 800 feet and ability to complete test with reducing need for cane         Patient Stated Goal 1       Start:  05/21/25    Expected End:  08/18/25       Patient will report ability to independently maintain established HEP consisting of strength and balance exercises, and readiness to start walks in the park with wife, within 1 mile distances

## 2025-05-21 NOTE — LETTER
May 21, 2025    Abilio Mercedes, PT  [address] [facility] [address]    Patient: Chris Monique   YOB: 1950   Date of Visit: 5/21/2025       Dear Lexi Barakat PA-C  [address] [place] [address]   [place] [address]    The attached plan of care is being sent to you because your patient’s medical reimbursement requires that you certify the plan of care. Your signature is required to allow uninterrupted insurance coverage.      You may indicate your approval by signing below and faxing this form back to us at Dept Fax: [phone].    Please call Dept: [phone] with any questions or concerns.    Thank you for this referral,        Abilio Mercedes PT  [facility] [address] [facility]  [address] [facility] [address]    Payer: Payor: [employer] / Plan: [employer]  HEALTHCARE / Product Type: *No Product type* /                                                                         Date:     Dear Abilio Mercedes PT,     Re: Mr. Chris Monique, MRN:[id number]    I certify that I have reviewed the attached plan of care and it is medically necessary for Mr. Chris Monique (1950) who is under my care.          ______________________________________                    _________________  Provider name and credentials                                           Date and time                                                                                           Plan of Care 5/21/25   Effective from: 5/21/2025  Effective to: 8/18/2025    Plan ID: [id number]            Participants as of Finalize on 5/21/2025    Name Type Comments Contact Info    Lexi Barakat PA-C Referring Provider  [phone]    Abilio Mercedes PT Physical Therapist  [phone]       Last Plan Note     Author: Abilio Mercedes PT Status: Incomplete Last edited: 5/21/2025  3:15 PM       Physical Therapy    Physical Therapy Evaluation    Patient Name: Chris Monique  MRN: [id number]  Today's Date:  5/21/2025    Time Entry:                          Visit #1  Insurance [employer]  Plan; 5/21/2025 - 8/18/2025  Problem List Items Addressed This Visit           ICD-10-CM       Musculoskeletal and Injuries    Weakness of both legs - Primary R29.898       Neuro    Spinal stenosis of lumbar region with neurogenic claudication M48.062       Symptoms and Signs    Unsteadiness on feet R26.81     Other Visit Diagnoses         Codes      Spinal cord compression (Multi)     G95.20            Assessment  Patient presents with residual weakness of left leg, hx left hip OA, and hx of lumbar and cervical spine surgery due to spinal cord compression. Both surgeries performed in 2024. Patient feels unsteady on feet with reported several falls prior and post surgery. Patient medicated for neuropathic pain in left leg and low back. Patient hopes to improve strength, balance, and be able to walk with his wife x 1 mile in part without fall risk and without need for assistive device. Patient will benefit from skilled PT in improving his functional goals. Patient is motivated and agreeable with discussed POC.     Plan  Treatment/Interventions: Education/ Instruction, Manual therapy, Taping techniques, Therapeutic activities, Therapeutic exercises, Neuromuscular re-education  PT Plan: Skilled PT  Rehab Potential: Good  Plan of Care Agreement: Patient  PT recommended frequency; 1-2 session x 10 sessions     Current Problem  1. Weakness of both legs        2. Spinal cord compression (Multi)  Referral to Physical Therapy      3. Spinal stenosis of lumbar region with neurogenic claudication  Referral to Physical Therapy      4. Unsteadiness on feet            Subjective  General:  General  Reason for Referral: PT eval and treat for weakness and unsteadiness  Referred By: Lexi Barakat PA-C  Past Medical History Relevant to Rehab: Hx of low back and neck pain with stenosis causing cord compression. Spinal decompression was necessary. Cervical  spine surgery took place in April and lumbar spine in November of 2024 (Hx of falls reported prior and after spinal surgeries)  General Comment: Currently on Gabapentin twice a day  Precautions: discussed fall risk reduction strategies   Precautions  STEADI Fall Risk Score (The score of 4 or more indicates an increased risk of falling): 13  Vital Signs: NA     Pain:  Pain Assessment: 0-10  0-10 (Numeric) Pain Score: 5 - Moderate pain  Pain Type: Neuropathic pain  Pain Location:  (legs pain, mostly in left leg, and lower back)  Pain Orientation:  (lower back, neck, left leg)  Pain Radiating Towards: Radiating tingling type pain in left leg  Pain Descriptors:  (tingling pain and stabbing pain in left leg)  Pain Frequency: Intermittent  Pain Onset: Gradual  Clinical Progression: Gradually improving  Effect of Pain on Daily Activities: All do all my functions, just feel irritated by pain  Patient's Stated Pain Goal:  (I would like a mile in the park with my wife, without walker or a cane)  Pain Interventions:  (Gabapentin daily, Tylenol for arthritis two a day)  Home Living: lives in single family home with stairs  Lives with wife     Prior Function Per Pt/Caregiver Report: independent with cane, drives, community ambulator, works at dental tech full time       Objective  Posture:- forward leaning posture   Flat lumbar spine and cervical spine  Range of Motion: Left hip  Extension 0  Flexion 0-85  Abduction 0-30  ER/IR not tested due to pain     Strength: Left LE  Hip Flexors 5-/5  Hip abductors 4/5  Hip adductors 5/5  Left Quads 5-/5  Left Hamstrings 4-/5  Left Plantarflexors 2/5  Left Dorsiflexors 4/5  Left foot inversion/eversion 3-/5  Right foot inversion/eversion 4/5  Right DF 4/5  Right PF 4/5  Right Quads 5/5  Right Hamstrings 5-/5  Right hip Flexors 5/5  Right hip abd/adductors 5-/5     Flexibility: left hip extension missing  Left Hamstrings and hip Flexors tight     Palpation: WNL     Special Tests: NA      Gait: slow gait at 0.6 meter/sec using cane  No left push off      Balance: fair  Sway evident with feet together (no balance loss)  Modified tandem stance ( sway +/no balance loss)  Feet apart stance with EC (balance loss)  360 degree turn without cane (+balance loss against wall)  TUG = 22 seconds, performed with cane     Stairs: one step at a time with rail assist     Bed Mobility: independent      Transfers: sit/stand from chair with arm assist          Outcome Measures:  Modified Oswestry =      OP EDUCATION:  Outpatient Education  Individual(s) Educated: Patient  Education Provided: Anatomy, Body Mechanics, Fall Risk, Home Exercise Program, POC  Diagnosis and Precautions: Weakness in legs and unsteadiness due to hx of spine compression and cervical and lumbar surgiries  Equipment:  (cane)  Risk and Benefits Discussed with Patient/Caregiver/Other: yes  Patient/Caregiver Demonstrated Understanding: yes  Plan of Care Discussed and Agreed Upon: yes  Patient Response to Education: Patient/Caregiver Verbalized Understanding of Information  Education Comment: POC and HEP configuration    Goals:                      Current Participants as of 5/21/2025    Name Type Comments Contact Info    Lexi Barakat PA-C Referring Provider  [phone]    Signature pending    Abilio Mercedes PT Physical Therapist  [phone]    Signature pending

## 2025-06-02 ENCOUNTER — TELEPHONE (OUTPATIENT)
Dept: ORTHOPEDIC SURGERY | Facility: HOSPITAL | Age: 75
End: 2025-06-02

## 2025-06-11 DIAGNOSIS — M47.12 CERVICAL SPONDYLOSIS WITH MYELOPATHY: ICD-10-CM

## 2025-06-11 RX ORDER — GABAPENTIN 100 MG/1
200 CAPSULE ORAL 2 TIMES DAILY
Qty: 60 CAPSULE | Refills: 11 | Status: SHIPPED | OUTPATIENT
Start: 2025-06-11

## 2025-06-16 ENCOUNTER — HOSPITAL ENCOUNTER (OUTPATIENT)
Dept: RADIOLOGY | Facility: HOSPITAL | Age: 75
Discharge: HOME | End: 2025-06-16
Payer: COMMERCIAL

## 2025-06-16 ENCOUNTER — OFFICE VISIT (OUTPATIENT)
Dept: ORTHOPEDIC SURGERY | Facility: HOSPITAL | Age: 75
End: 2025-06-16
Payer: COMMERCIAL

## 2025-06-16 ENCOUNTER — HOSPITAL ENCOUNTER (OUTPATIENT)
Dept: RADIOLOGY | Facility: EXTERNAL LOCATION | Age: 75
Discharge: HOME | End: 2025-06-16

## 2025-06-16 DIAGNOSIS — M25.552 LEFT HIP PAIN: ICD-10-CM

## 2025-06-16 DIAGNOSIS — M16.12 PRIMARY OSTEOARTHRITIS OF LEFT HIP: ICD-10-CM

## 2025-06-16 PROCEDURE — 99212 OFFICE O/P EST SF 10 MIN: CPT | Performed by: PHYSICIAN ASSISTANT

## 2025-06-16 PROCEDURE — 20611 DRAIN/INJ JOINT/BURSA W/US: CPT | Mod: LT | Performed by: PHYSICIAN ASSISTANT

## 2025-06-16 PROCEDURE — 1125F AMNT PAIN NOTED PAIN PRSNT: CPT | Performed by: PHYSICIAN ASSISTANT

## 2025-06-16 PROCEDURE — 72170 X-RAY EXAM OF PELVIS: CPT

## 2025-06-16 PROCEDURE — 72170 X-RAY EXAM OF PELVIS: CPT | Performed by: RADIOLOGY

## 2025-06-16 PROCEDURE — 2500000004 HC RX 250 GENERAL PHARMACY W/ HCPCS (ALT 636 FOR OP/ED): Performed by: PHYSICIAN ASSISTANT

## 2025-06-16 PROCEDURE — 1159F MED LIST DOCD IN RCRD: CPT | Performed by: PHYSICIAN ASSISTANT

## 2025-06-16 PROCEDURE — 99213 OFFICE O/P EST LOW 20 MIN: CPT | Performed by: PHYSICIAN ASSISTANT

## 2025-06-16 RX ADMIN — LIDOCAINE HYDROCHLORIDE 4 ML: 10 INJECTION, SOLUTION INFILTRATION; PERINEURAL at 14:19

## 2025-06-16 RX ADMIN — METHYLPREDNISOLONE ACETATE 40 MG: 40 INJECTION, SUSPENSION INTRALESIONAL; INTRAMUSCULAR; INTRASYNOVIAL; SOFT TISSUE at 14:19

## 2025-06-16 ASSESSMENT — PAIN SCALES - GENERAL: PAINLEVEL_OUTOF10: 9

## 2025-06-16 ASSESSMENT — PAIN - FUNCTIONAL ASSESSMENT: PAIN_FUNCTIONAL_ASSESSMENT: 0-10

## 2025-06-16 ASSESSMENT — PAIN DESCRIPTION - DESCRIPTORS: DESCRIPTORS: ACHING;DULL

## 2025-06-16 NOTE — PROGRESS NOTES
Patient is here today regarding left hip pain.  He has had longstanding left lower extremity pain.  He had a left knee replacement and overall did well.  He recently had a lumbar fusion which has helped with his leg weakness.  He has also had cervical fusions.  Currently his biggest pain complaint is pain in the left groin.  It is worse with increased activity.  Dr. Barnes did his recent surgery and he explained he would likely need a hip replacement in the future.  He was referred over to me to discuss the possibility of an intra-articular injection.  We did a dedicated x-rays of the hip today which does show end-stage osteoarthritis.  We discussed the intra-articular injection and he would like to proceed.          L Inj/Asp: L hip joint on 6/16/2025 2:19 PM  Indications: pain  Details: 20 G needle, ultrasound-guided anterior approach  Medications: 4 mL lidocaine 10 mg/mL (1 %); 40 mg methylPREDNISolone acetate 40 mg/mL  Outcome: tolerated well, no immediate complications  Consent was given by the patient. Immediately prior to procedure a time out was called to verify the correct patient, procedure, equipment, support staff and site/side marked as required. Patient was prepped and draped in the usual sterile fashion.       Patient noted significant improvement in his pain immediately after the injection.  We discussed the natural course of the injection.  He was also provided names to set up consultation to discuss the potential for a total hip arthroplasty.      Mariana Main PA-C

## 2025-06-17 RX ORDER — LIDOCAINE HYDROCHLORIDE 10 MG/ML
4 INJECTION, SOLUTION INFILTRATION; PERINEURAL
Status: COMPLETED | OUTPATIENT
Start: 2025-06-16 | End: 2025-06-16

## 2025-06-17 RX ORDER — METHYLPREDNISOLONE ACETATE 40 MG/ML
40 INJECTION, SUSPENSION INTRA-ARTICULAR; INTRALESIONAL; INTRAMUSCULAR; SOFT TISSUE
Status: COMPLETED | OUTPATIENT
Start: 2025-06-16 | End: 2025-06-16

## 2025-06-19 ENCOUNTER — APPOINTMENT (OUTPATIENT)
Dept: PHYSICAL THERAPY | Facility: CLINIC | Age: 75
End: 2025-06-19
Payer: COMMERCIAL

## 2025-06-26 ENCOUNTER — DOCUMENTATION (OUTPATIENT)
Dept: PHYSICAL THERAPY | Facility: CLINIC | Age: 75
End: 2025-06-26

## 2025-06-26 ENCOUNTER — DOCUMENTATION (OUTPATIENT)
Dept: PHYSICAL THERAPY | Facility: CLINIC | Age: 75
End: 2025-06-26
Payer: COMMERCIAL

## 2025-06-30 NOTE — PROGRESS NOTES
Physical Therapy                 Therapy Communication Note    Patient Name: Chris Monique  MRN: [id number]  Department:   Room: Room/bed info not found  Today's Date: 6/30/2025     Discipline: Physical Therapy    Missed Visit:       Missed Visit Reason:  no showed, called and left vm with patient about next appt date/time as well as our attendance policy. Left on home phone, unable to leave message on cell phone.     Missed Time: No Show    Comment:

## 2025-07-03 ENCOUNTER — DOCUMENTATION (OUTPATIENT)
Dept: PHYSICAL THERAPY | Facility: CLINIC | Age: 75
End: 2025-07-03
Payer: COMMERCIAL

## 2025-07-03 NOTE — PROGRESS NOTES
Physical Therapy                 Therapy Communication Note    Patient Name: Chris Monique  MRN: [id number]  Department:   Room: Room/bed info not found  Today's Date: 7/3/2025     Discipline: Physical Therapy    Missed Visit:       Missed Visit Reason:  patient no showed, called and left another vm reviewing our attendance policy, along with call back number and remaining visits will be canceled if we do not hear back from patient.     Missed Time: No Show    Comment:

## 2025-07-10 ENCOUNTER — DOCUMENTATION (OUTPATIENT)
Dept: PHYSICAL THERAPY | Facility: CLINIC | Age: 75
End: 2025-07-10
Payer: COMMERCIAL

## 2025-07-10 NOTE — PROGRESS NOTES
Physical Therapy                 Therapy Communication Note    Patient Name: Chris Monique  MRN: [id number]  Department:   Room: Room/bed info not found  Today's Date: 7/10/2025     Discipline: Physical Therapy    Missed Visit:       Missed Visit Reason:  no showed multiple visits, left VM with call back number for patient if needed, informed of remaining visits being canceled    Missed Time: No Show    Comment:

## 2025-07-17 ENCOUNTER — APPOINTMENT (OUTPATIENT)
Dept: PHYSICAL THERAPY | Facility: CLINIC | Age: 75
End: 2025-07-17
Payer: COMMERCIAL

## 2025-07-24 ENCOUNTER — APPOINTMENT (OUTPATIENT)
Dept: PHYSICAL THERAPY | Facility: CLINIC | Age: 75
End: 2025-07-24
Payer: COMMERCIAL

## 2025-07-31 ENCOUNTER — APPOINTMENT (OUTPATIENT)
Dept: PHYSICAL THERAPY | Facility: CLINIC | Age: 75
End: 2025-07-31
Payer: COMMERCIAL

## 2025-08-07 ENCOUNTER — APPOINTMENT (OUTPATIENT)
Dept: PHYSICAL THERAPY | Facility: CLINIC | Age: 75
End: 2025-08-07
Payer: COMMERCIAL

## 2025-08-14 ENCOUNTER — APPOINTMENT (OUTPATIENT)
Dept: PHYSICAL THERAPY | Facility: CLINIC | Age: 75
End: 2025-08-14
Payer: COMMERCIAL

## 2025-08-22 ENCOUNTER — HOSPITAL ENCOUNTER (OUTPATIENT)
Dept: RADIOLOGY | Facility: CLINIC | Age: 75
Discharge: HOME | End: 2025-08-22
Payer: COMMERCIAL

## 2025-08-22 ENCOUNTER — OFFICE VISIT (OUTPATIENT)
Dept: ORTHOPEDIC SURGERY | Facility: CLINIC | Age: 75
End: 2025-08-22
Payer: COMMERCIAL

## 2025-08-22 VITALS — BODY MASS INDEX: 31.5 KG/M2 | WEIGHT: 220 LBS | HEIGHT: 70 IN

## 2025-08-22 DIAGNOSIS — M25.552 LEFT HIP PAIN: ICD-10-CM

## 2025-08-22 DIAGNOSIS — M25.552 LEFT HIP PAIN: Primary | ICD-10-CM

## 2025-08-22 PROCEDURE — 73502 X-RAY EXAM HIP UNI 2-3 VIEWS: CPT | Mod: LT

## 2025-08-22 PROCEDURE — 1159F MED LIST DOCD IN RCRD: CPT | Performed by: STUDENT IN AN ORGANIZED HEALTH CARE EDUCATION/TRAINING PROGRAM

## 2025-08-22 PROCEDURE — 99214 OFFICE O/P EST MOD 30 MIN: CPT | Performed by: STUDENT IN AN ORGANIZED HEALTH CARE EDUCATION/TRAINING PROGRAM

## 2025-08-22 PROCEDURE — 3008F BODY MASS INDEX DOCD: CPT | Performed by: STUDENT IN AN ORGANIZED HEALTH CARE EDUCATION/TRAINING PROGRAM

## 2025-08-22 PROCEDURE — 99212 OFFICE O/P EST SF 10 MIN: CPT | Performed by: STUDENT IN AN ORGANIZED HEALTH CARE EDUCATION/TRAINING PROGRAM

## 2025-08-22 PROCEDURE — 1125F AMNT PAIN NOTED PAIN PRSNT: CPT | Performed by: STUDENT IN AN ORGANIZED HEALTH CARE EDUCATION/TRAINING PROGRAM

## 2025-08-22 ASSESSMENT — PAIN SCALES - GENERAL: PAINLEVEL_OUTOF10: 9

## 2025-08-22 ASSESSMENT — PAIN DESCRIPTION - DESCRIPTORS: DESCRIPTORS: ACHING

## 2025-08-22 ASSESSMENT — PAIN - FUNCTIONAL ASSESSMENT: PAIN_FUNCTIONAL_ASSESSMENT: 0-10

## 2025-08-26 DIAGNOSIS — M16.12 PRIMARY OSTEOARTHRITIS OF LEFT HIP: ICD-10-CM

## 2025-08-26 DIAGNOSIS — M16.12 UNILATERAL PRIMARY OSTEOARTHRITIS, LEFT HIP: ICD-10-CM

## (undated) DEVICE — PIN, SKULL, MAYFIELD, ADULT

## (undated) DEVICE — Device

## (undated) DEVICE — DRAPE, INCISE, ANTIMICROBIAL, IOBAN 2, LARGE, 17 X 23 IN, DISPOSABLE, STERILE

## (undated) DEVICE — STRIP, SKIN CLOSURE, STERI STRIP, REINFORCED, 0.5 X 4 IN

## (undated) DEVICE — DRESSING, TRANSPARENT, TEGADERM, 8 X 12 IN

## (undated) DEVICE — KIT, MINOR, DOUBLE BASIN

## (undated) DEVICE — BONE, MILL, MIDAS REX, DUAL BLADE, ELECTRIC

## (undated) DEVICE — TOWEL, SURGICAL, NEURO, O/R, 16 X 26, BLUE, STERILE

## (undated) DEVICE — DRAIN, WOUND, ROUND, W/TROCAR, HOLE PATTERN, 10 IN, MEDIUM, 1/8 X 49 IN

## (undated) DEVICE — COLLAR, CERVICAL, UNIVERSAL, 3 IN

## (undated) DEVICE — BUR, 4.0MM ROUND CARBIDE

## (undated) DEVICE — SUTURE, STRATAFIX PDS PLUS, 1, OS-8, SYMMETRIC 60CM DYED

## (undated) DEVICE — SUTURE, STRATAFIX, 3-0 MONOCRYL PLUS, PS-2 SPIRAL UNDYED

## (undated) DEVICE — DRAPE, SHEET, UTILITY, NON ABSORBENT, 18 X 26 IN, LF

## (undated) DEVICE — CLOSURE SYSTEM, DERMABOND, PRINEO, 22CM, STERILE

## (undated) DEVICE — DRILL, NEURO, PRECISION, 3MM X 3.8MM, CARBIDE

## (undated) DEVICE — GLOVE, SURGICAL, PROTEXIS PI ORTHO, 7.5, PF, LF

## (undated) DEVICE — GLOVE, SURGICAL, PROTEXIS PI ORTHO, 8.0, PF, LF

## (undated) DEVICE — EVACUATOR, WOUND, CLOSED, 3 SPRING, 400 CC, Y CONNECTING TUBE

## (undated) DEVICE — DRAPE, INCISE, ANTIMICROBIAL, IOBAN 2, STERI DRAPE, 23 X 33 IN, DISPOSABLE, STERILE

## (undated) DEVICE — SUTURE, VICRYL, 2-0, 27 IN, FSL, UNDYED

## (undated) DEVICE — TIP,  ELECTRODE COATED INSULATED, EXTENDED, LF

## (undated) DEVICE — SEALER, BIPOLAR, AQUA MANTYS 6.0

## (undated) DEVICE — DRAPE, C-ARM, W/12 IN COVER, LI XTRAY TUBE

## (undated) DEVICE — KIT, PATIENT CARE, JACKSON TABLE W/PRONE-SAFE HEADREST

## (undated) DEVICE — ADHESIVE, SKIN, DERMABOND ADVANCED, 15CM, PEN-STYLE

## (undated) DEVICE — RELINE C DRILL BIT

## (undated) DEVICE — SPONGE, LAP, XRAY DECT, 4IN X 18IN, W/MASTER DMT, STERILE

## (undated) DEVICE — DRAPE COVER, C ARM, FLOUROSCAN IMAGING SYS

## (undated) DEVICE — DRESSING, GAUZE, KERLIX, 12 PLY, 4 X 4 IN, STERILE

## (undated) DEVICE — APPLICATOR, CHLORAPREP, W/ORANGE TINT, 26ML

## (undated) DEVICE — SEALANT, HEMOSTATIC, FLOSEAL, 10 ML